# Patient Record
Sex: FEMALE | Race: WHITE | NOT HISPANIC OR LATINO | Employment: OTHER | URBAN - METROPOLITAN AREA
[De-identification: names, ages, dates, MRNs, and addresses within clinical notes are randomized per-mention and may not be internally consistent; named-entity substitution may affect disease eponyms.]

---

## 2018-01-09 ENCOUNTER — OFFICE VISIT (OUTPATIENT)
Dept: MEDICAL GROUP | Facility: MEDICAL CENTER | Age: 68
End: 2018-01-09
Payer: MEDICARE

## 2018-01-09 VITALS
RESPIRATION RATE: 16 BRPM | HEIGHT: 70 IN | WEIGHT: 143.3 LBS | HEART RATE: 80 BPM | DIASTOLIC BLOOD PRESSURE: 78 MMHG | OXYGEN SATURATION: 94 % | TEMPERATURE: 98.7 F | BODY MASS INDEX: 20.52 KG/M2 | SYSTOLIC BLOOD PRESSURE: 122 MMHG

## 2018-01-09 DIAGNOSIS — E89.0 POSTOPERATIVE HYPOTHYROIDISM: ICD-10-CM

## 2018-01-09 DIAGNOSIS — Z76.89 ESTABLISHING CARE WITH NEW DOCTOR, ENCOUNTER FOR: ICD-10-CM

## 2018-01-09 DIAGNOSIS — Z12.31 ENCOUNTER FOR SCREENING MAMMOGRAM FOR BREAST CANCER: ICD-10-CM

## 2018-01-09 DIAGNOSIS — F39 MOOD DISORDER (HCC): ICD-10-CM

## 2018-01-09 PROCEDURE — 99203 OFFICE O/P NEW LOW 30 MIN: CPT | Performed by: INTERNAL MEDICINE

## 2018-01-09 RX ORDER — ESTRADIOL 0.5 MG/1
0.5 TABLET ORAL DAILY
COMMUNITY
End: 2018-04-06

## 2018-01-09 RX ORDER — ESCITALOPRAM OXALATE 20 MG/1
20 TABLET ORAL DAILY
COMMUNITY
End: 2019-03-12 | Stop reason: SDUPTHER

## 2018-01-09 RX ORDER — CELECOXIB 200 MG/1
200 CAPSULE ORAL
COMMUNITY
End: 2019-03-12

## 2018-01-09 RX ORDER — GABAPENTIN 300 MG/1
300 CAPSULE ORAL
COMMUNITY
End: 2019-05-14

## 2018-01-09 RX ORDER — CELECOXIB 200 MG/1
200 CAPSULE ORAL 2 TIMES DAILY
COMMUNITY
End: 2018-01-09

## 2018-01-09 RX ORDER — SUMATRIPTAN 50 MG/1
50 TABLET, FILM COATED ORAL
COMMUNITY
End: 2021-08-06 | Stop reason: SDUPTHER

## 2018-01-09 RX ORDER — LEVOTHYROXINE SODIUM 0.07 MG/1
75 TABLET ORAL
COMMUNITY
End: 2018-12-01 | Stop reason: SDUPTHER

## 2018-01-09 ASSESSMENT — PATIENT HEALTH QUESTIONNAIRE - PHQ9: CLINICAL INTERPRETATION OF PHQ2 SCORE: 0

## 2018-01-09 NOTE — PROGRESS NOTES
Subjective:   Malou Pretty is a 67 y.o. female here today to establish care and        Chief complaint: Mood disorder      1. Establishing care with new doctor, encounter for    Patient moved to the [facility] from [facility]. She is here to establish with a physician here. Her daughter is an internist with the [employer], she stays busy by helping to babysit her grandchild and grand puppy. She is generally very healthy and has no outstanding chronic medical problems.  She had an annual wellness exam in [place] in October with lab work and flu shot etc.     2. Mood disorder (CMS-HCC)    On Lexapro 20 mg daily for several years. Mood stable now. No outstanding depression or anxiety.    3. Postoperative hypothyroidism    On low-dose Synthroid since parathyroidectomy in 2002.    4. Encounter for screening mammogram for breast cancer    Due for mammogram.      Current medicines (including changes today)  Current Outpatient Prescriptions   Medication Sig Dispense Refill   • gabapentin (NEURONTIN) 300 MG Cap Take 300 mg by mouth.     • escitalopram (LEXAPRO) 20 MG tablet Take 20 mg by mouth every day.     • sumatriptan (IMITREX) 50 MG Tab Take 50 mg by mouth Once PRN.     • estradiol (ESTRACE) 0.5 MG tablet Take 0.5 mg by mouth every day.     • levothyroxine (SYNTHROID) 75 MCG Tab Take 75 mcg by mouth Every morning on an empty stomach.     • Calcium Carb-Cholecalciferol (CALCIUM 1000 + D PO) Take 1 Cap by mouth 1 time daily as needed.     • celecoxib (CELEBREX) 200 MG Cap Take 200 mg by mouth 1 time daily as needed.       No current facility-administered medications for this visit.      She  has no past medical history on file.    Social History     Social History   • Marital status:      Spouse name: N/A   • Number of children: N/A   • Years of education: N/A     Social History Main Topics   • Smoking status: Never Smoker   • Smokeless tobacco: Never Used   • Alcohol use 0.6 oz/week     1 Glasses of wine per  "week      Comment: once a week   • Drug use: Unknown   • Sexual activity: No     Other Topics Concern   • Weight Concern No   • Exercise Yes     Social History Narrative   • No narrative on file     Family History   Problem Relation Age of Onset   • Other Mother      ALS   • Alzheimer's Disease Father        ROS       - Constitutional: Negative for fever, chills, unexpected weight change, and fatigue/generalized weakness.     - HEENT: Occasional migraine headache, takes Imitrex and or gabapentin.     - Respiratory: Negative for cough, Shortness of breath    - Cardiovascular: Negative for chest pain and bilateral lower extremity edema.     - Gastrointestinal: Negative for heartburn,  abdominal pain,  diarrhea, constipation . Intermittently has loose stool several times per day, since her bowel surgery last spring.    - Genitourinary: Negative for dysuria  and urinary incontinence.    - Musculoskeletal: Negative for  back pain and joint pain.     - Skin: Negative for rash . History of shingles, no postherpetic neuralgia    - Neurological: Negative for dizziness,  tremors, focal weakness     - Psychiatric/Behavioral: Negative for depression and memory loss.             Objective:     Blood pressure 122/78, pulse 80, temperature 37.1 °C (98.7 °F), resp. rate 16, height 1.778 m (5' 10\"), weight 65 kg (143 lb 4.8 oz), SpO2 94 %, not currently breastfeeding. Body mass index is 20.56 kg/m².     Physical Exam:  Constitutional: Alert, no distress.  Skin: Warm, dry, good turgor, no rashes in visible areas.  Eye: Equal, round and reactive, conjunctiva clear, lids normal.  ENMT: Lips without lesions, good dentition, oropharynx clear. Hearing grossly intact.  Neck: No masses, no thyromegaly. No cervical or supraclavicular lymphadenopathy  Respiratory: Unlabored respiratory effort, lungs clear to auscultation, no wheezes, no rhonchi.  Cardiovascular: Regular rate and rhythm, without murmur, no edema.  Abdomen: Soft, non-tender, no " masses, no hepatosplenomegaly.  Psych: Alert and oriented x3, normal affect and mood. Insight and judgment good            Assessment and Plan:   The following treatment plan was discussed    1. Establishing care with new doctor, encounter for    Patient is up-to-date with all of her screening measures. We will try to obtain her medical records for her recent labs and colonoscopy.    2. Mood disorder (CMS-HCC)    Stable, continue Lexapro    3. Postoperative hypothyroidism    Stable, continue low-dose Synthroid, takes one half of a 75 µg tablet daily    4. Encounter for screening mammogram for breast cancer      - MA-MAMMO SCREENING BILAT W/SANDY W/CAD; Future      Followup: Patient will follow up annually, sooner if necessary.

## 2018-01-09 NOTE — LETTER
Critical access hospital  Natalya Gates M.D.  [address]  [place] [address]  Fax: [phone]   Authorization for Release/Disclosure of   Protected Health Information   Name: PHONG PRETTY : 1950 SSN: [id number]   Address: [address] [place] [address]  [place] [address] Phone:    [phone] (home)    I authorize the entity listed below to release/disclose the PHI below to:   Critical access hospital/Natalya Gates M.D. and Natalya Gates M.D.   Provider or Entity Name:  Lidia Holm M.D.   Address   [facility]   Phone:  [phone]    Fax:     Reason for request: continuity of care   Information to be released:    [  ] LAST COLONOSCOPY,  including any PATH REPORT and follow-up  [  ] LAST FIT/COLOGUARD RESULT [  ] LAST DEXA  [  ] LAST MAMMOGRAM  [  ] LAST PAP  [  ] LAST LABS [  ] RETINA EXAM REPORT  [  ] IMMUNIZATION RECORDS  [ X ] Release all info      [  ] Check here and initial the line next to each item to release ALL health information INCLUDING  _____ Care and treatment for drug and / or alcohol abuse  _____ HIV testing, infection status, or AIDS  _____ Genetic Testing    DATES OF SERVICE OR TIME PERIOD TO BE DISCLOSED: _____________  I understand and acknowledge that:  * This Authorization may be revoked at any time by you in writing, except if your health information has already been used or disclosed.  * Your health information that will be used or disclosed as a result of you signing this authorization could be re-disclosed by the recipient. If this occurs, your re-disclosed health information may no longer be protected by State or Federal laws.  * You may refuse to sign this Authorization. Your refusal will not affect your ability to obtain treatment.  * This Authorization becomes effective upon signing and will  on (date) __________.      If no date is indicated, this Authorization will  one (1) year from the signature date.    Name: Phong Pretty    Signature:   Date:          1/9/2018       PLEASE FAX REQUESTED RECORDS BACK TO: [phone]

## 2018-01-17 ENCOUNTER — HOSPITAL ENCOUNTER (OUTPATIENT)
Dept: RADIOLOGY | Facility: MEDICAL CENTER | Age: 68
End: 2018-01-17
Attending: INTERNAL MEDICINE
Payer: MEDICARE

## 2018-01-17 DIAGNOSIS — Z12.31 ENCOUNTER FOR SCREENING MAMMOGRAM FOR BREAST CANCER: ICD-10-CM

## 2018-01-17 PROCEDURE — 77067 SCR MAMMO BI INCL CAD: CPT

## 2018-01-22 ENCOUNTER — HOSPITAL ENCOUNTER (OUTPATIENT)
Dept: RADIOLOGY | Facility: MEDICAL CENTER | Age: 68
End: 2018-01-22

## 2018-01-24 ENCOUNTER — TELEPHONE (OUTPATIENT)
Dept: RADIOLOGY | Facility: MEDICAL CENTER | Age: 68
End: 2018-01-24

## 2018-01-25 ENCOUNTER — HOSPITAL ENCOUNTER (OUTPATIENT)
Dept: RADIOLOGY | Facility: MEDICAL CENTER | Age: 68
End: 2018-01-25
Attending: INTERNAL MEDICINE
Payer: MEDICARE

## 2018-01-25 DIAGNOSIS — R92.8 ABNORMAL MAMMOGRAM OF LEFT BREAST: ICD-10-CM

## 2018-01-25 PROCEDURE — 77065 DX MAMMO INCL CAD UNI: CPT | Mod: LT

## 2018-01-25 PROCEDURE — 76642 ULTRASOUND BREAST LIMITED: CPT | Mod: LT

## 2018-01-25 NOTE — TELEPHONE ENCOUNTER
LM to conf apt @ [facility] on 1/25 @ 10:00 check in @ 9:45, reviewed prep and location

## 2018-04-06 ENCOUNTER — PATIENT MESSAGE (OUTPATIENT)
Dept: MEDICAL GROUP | Facility: MEDICAL CENTER | Age: 68
End: 2018-04-06

## 2018-04-06 DIAGNOSIS — Z78.0 POST-MENOPAUSAL: ICD-10-CM

## 2018-04-06 RX ORDER — ESTRADIOL 1 MG/1
1 TABLET ORAL DAILY
Qty: 30 TAB | Refills: 0 | Status: SHIPPED | OUTPATIENT
Start: 2018-04-06 | End: 2018-04-24

## 2018-04-24 ENCOUNTER — OFFICE VISIT (OUTPATIENT)
Dept: MEDICAL GROUP | Facility: MEDICAL CENTER | Age: 68
End: 2018-04-24
Payer: MEDICARE

## 2018-04-24 VITALS
DIASTOLIC BLOOD PRESSURE: 90 MMHG | RESPIRATION RATE: 16 BRPM | HEIGHT: 70 IN | BODY MASS INDEX: 20.77 KG/M2 | HEART RATE: 76 BPM | SYSTOLIC BLOOD PRESSURE: 138 MMHG | WEIGHT: 145.06 LBS | OXYGEN SATURATION: 94 % | TEMPERATURE: 97.8 F

## 2018-04-24 DIAGNOSIS — Z78.0 POST-MENOPAUSAL: ICD-10-CM

## 2018-04-24 DIAGNOSIS — R10.30 LOWER ABDOMINAL PAIN: ICD-10-CM

## 2018-04-24 DIAGNOSIS — M15.9 PRIMARY OSTEOARTHRITIS INVOLVING MULTIPLE JOINTS: ICD-10-CM

## 2018-04-24 PROBLEM — M15.0 PRIMARY OSTEOARTHRITIS INVOLVING MULTIPLE JOINTS: Status: ACTIVE | Noted: 2018-04-24

## 2018-04-24 PROCEDURE — 99214 OFFICE O/P EST MOD 30 MIN: CPT | Performed by: INTERNAL MEDICINE

## 2018-04-24 RX ORDER — ESTRADIOL 0.5 MG/1
TABLET ORAL
Qty: 45 TAB | Refills: 2 | Status: SHIPPED | OUTPATIENT
Start: 2018-04-24 | End: 2018-12-05 | Stop reason: SDUPTHER

## 2018-04-24 RX ORDER — ESTRADIOL 0.5 MG/1
0.5 TABLET ORAL DAILY
COMMUNITY
End: 2018-04-24 | Stop reason: SDUPTHER

## 2018-04-24 NOTE — PROGRESS NOTES
Chief Complaint   Patient presents with   • Possible Hernia     pt reports having discomfort in abdominal area   • Medication Management     estrace       HISTORY OF PRESENT ILLNESS: Patient is a 67 y.o. female patient who presents today to discuss the evaluation and management of:          1. Lower abdominal pain    Patient is complaining of vague lower abdominal pain. She had abdominal surgery done one year ago for bowel obstruction caused by adhesions from previous abdominal hysterectomy. She has a large midline scar and has noted some bulging in her abdominal wall to the left of this. She has occasional pain in that area when she reaches or moves. She is concerned that she could be developing a hernia. She would like to see her surgeon Dr. Blackmon and requires a referral.    2. Post-menopausal    Patient's been taking one half of a 0.5 mg Estrace tablet daily. She is considering trying to wean off of it further. She does have problems with urinary incontinence and is using a vaginal stimulator 20 minutes a day which has been helpful for her bladder control. She tried Ditropan, however did not like the dry mouth side effect. She also has vaginal estrogen that she uses weekly.    3. Primary osteoarthritis involving multiple joints    Patient has arthritis in her neck, and hands. She takes Celebrex 200 mg daily. She also has gabapentin which she takes occasionally for nerve pain in her neck and shoulders. She would like to see a rheumatologist as she has been having more pain recently.        Patient Active Problem List    Diagnosis Date Noted   • Lower abdominal pain 04/24/2018   • Primary osteoarthritis involving multiple joints 04/24/2018   • Mood disorder (CMS-[facility]) 01/09/2018   • Postoperative hypothyroidism 01/09/2018        Allergies:Neomycin and Sulfate    Current meds including changes today  Current Outpatient Prescriptions   Medication Sig Dispense Refill   • FEXOFENADINE HCL PO Take  by mouth.     •  "GUAIFENESIN PO Take  by mouth.     • estradiol (ESTRACE) 0.5 MG tablet Take 1/2 tab daily 45 Tab 2   • gabapentin (NEURONTIN) 300 MG Cap Take 300 mg by mouth.     • escitalopram (LEXAPRO) 20 MG tablet Take 20 mg by mouth every day.     • levothyroxine (SYNTHROID) 75 MCG Tab Take 75 mcg by mouth Every morning on an empty stomach.     • Calcium Carb-Cholecalciferol (CALCIUM 1000 + D PO) Take 1 Cap by mouth 1 time daily as needed.     • celecoxib (CELEBREX) 200 MG Cap Take 200 mg by mouth 1 time daily as needed.     • sumatriptan (IMITREX) 50 MG Tab Take 50 mg by mouth Once PRN.       No current facility-administered medications for this visit.      Social History   Substance Use Topics   • Smoking status: Never Smoker   • Smokeless tobacco: Never Used   • Alcohol use 0.6 oz/week     1 Glasses of wine per week      Comment: once a week     Social History     Social History Narrative   • No narrative on file       Family History   Problem Relation Age of Onset   • Alzheimer's Disease Mother    • Other Father      ALS       Review of Systems:  No chest pain, No shortness of breath, No dyspnea on exertion  Gastrointestinal ROS: No abdominal pain, No nausea, vomiting, diarrhea, or constipation        Exam:      Blood pressure 138/90, pulse 76, temperature 36.6 °C (97.8 °F), resp. rate 16, height 1.778 m (5' 10\"), weight 65.8 kg (145 lb 1 oz), SpO2 94 %, not currently breastfeeding.  General:  Well nourished, well developed female in NAD affect and mood within normal limits  Head is grossly normal.  Neck: Supple without adenopathy  Pulmonary: Clear to ausculation.  Normal effort. No rales, rhonchi, or wheezing.  Cardiovascular: Regular rate and rhythm without murmur.   Abdomen: Well-healed midline scar. Asymmetric bulge in lower abdomen to left of scar. It is mildly tender and reducible.  Extremities: no clubbing, cyanosis, or edema. OA changes both hands  Neuro: moves all extremities symmetrically    Please note that this " dictation was created using voice recognition software. I have made every reasonable attempt to correct obvious errors, but I expect that there are errors of grammar and possibly content that I did not discover before finalizing the note.    Assessment/Plan:  1. Lower abdominal pain      - REFERRAL TO GENERAL SURGERY    2. Post-menopausal    -Continue vaginal estrogen weekly, this may be helping with her incontinence as well  - estradiol (ESTRACE) 0.5 MG tablet; Take 1/2 tab daily  Dispense: 45 Tab; Refill: 2    3. Primary osteoarthritis involving multiple joints    -Continue Celebrex, discussed that she could increase to 2  200 mg tablets daily. Patient prefers to wait this time.  - REFERRAL TO RHEUMATOLOGY    Followup: No Follow-up on file.

## 2018-09-07 ENCOUNTER — PATIENT MESSAGE (OUTPATIENT)
Dept: MEDICAL GROUP | Facility: MEDICAL CENTER | Age: 68
End: 2018-09-07

## 2018-10-16 ENCOUNTER — OFFICE VISIT (OUTPATIENT)
Dept: MEDICAL GROUP | Facility: MEDICAL CENTER | Age: 68
End: 2018-10-16
Payer: MEDICARE

## 2018-10-16 VITALS
WEIGHT: 144 LBS | TEMPERATURE: 97.7 F | SYSTOLIC BLOOD PRESSURE: 110 MMHG | HEART RATE: 80 BPM | DIASTOLIC BLOOD PRESSURE: 72 MMHG | OXYGEN SATURATION: 97 % | BODY MASS INDEX: 20.62 KG/M2 | RESPIRATION RATE: 16 BRPM | HEIGHT: 70 IN

## 2018-10-16 DIAGNOSIS — M89.9 OSTEOPATHY: ICD-10-CM

## 2018-10-16 DIAGNOSIS — Z23 NEED FOR VACCINATION: ICD-10-CM

## 2018-10-16 DIAGNOSIS — M15.9 PRIMARY OSTEOARTHRITIS INVOLVING MULTIPLE JOINTS: ICD-10-CM

## 2018-10-16 DIAGNOSIS — Z79.890 POSTMENOPAUSAL HRT (HORMONE REPLACEMENT THERAPY): ICD-10-CM

## 2018-10-16 DIAGNOSIS — F39 MOOD DISORDER (HCC): ICD-10-CM

## 2018-10-16 DIAGNOSIS — E89.0 POSTOPERATIVE HYPOTHYROIDISM: ICD-10-CM

## 2018-10-16 DIAGNOSIS — Z86.39 HISTORY OF PRIMARY HYPERPARATHYROIDISM: ICD-10-CM

## 2018-10-16 PROBLEM — R10.30 LOWER ABDOMINAL PAIN: Status: RESOLVED | Noted: 2018-04-24 | Resolved: 2018-10-16

## 2018-10-16 PROCEDURE — G0008 ADMIN INFLUENZA VIRUS VAC: HCPCS | Performed by: INTERNAL MEDICINE

## 2018-10-16 PROCEDURE — 90662 IIV NO PRSV INCREASED AG IM: CPT | Performed by: INTERNAL MEDICINE

## 2018-10-16 PROCEDURE — 99214 OFFICE O/P EST MOD 30 MIN: CPT | Mod: 25 | Performed by: INTERNAL MEDICINE

## 2018-10-16 RX ORDER — CYCLOBENZAPRINE HCL 10 MG
10 TABLET ORAL 3 TIMES DAILY PRN
Qty: 45 TAB | Refills: 1 | Status: SHIPPED | OUTPATIENT
Start: 2018-10-16 | End: 2020-08-24 | Stop reason: SDUPTHER

## 2018-10-16 RX ORDER — CYCLOBENZAPRINE HCL 10 MG
10 TABLET ORAL 3 TIMES DAILY PRN
COMMUNITY
End: 2018-10-16 | Stop reason: SDUPTHER

## 2018-10-16 NOTE — PROGRESS NOTES
Chief Complaint   Patient presents with   • Labs Only     wants to get labs done,lipid,calcium,thyroid     Chief complaint: 6-month follow-up of hypothyroid, HRT.    HISTORY OF PRESENT ILLNESS: Patient is a 68 y.o. female patient who presents today to discuss the evaluation and management of:          1. Need for vaccination    Requesting flu shot    2. Osteopathy    Last bone density 6 or 7 years ago in [place].  Patient had osteopenia at that time however it was felt to be due to primary hyperparathyroidism.  Patient is currently taking adequate vitamin D and calcium supplementation.    3. History of primary hyperparathyroidism    Patient had single adenoma removed around 2003.  According to patient, serum calcium tends to be low.    4. Postoperative hypothyroidism    On Synthroid 75 mcg daily.  Due for TSH    5. Postmenopausal HRT (hormone replacement therapy)    Taking half of a 0.5 mg Estrace tablet daily.  If patient discontinues it, she gets unpleasant hot flashes.    6. Mood disorder (HCC)    Doing well with Lexapro 20 mg daily.    7. Primary osteoarthritis involving multiple joints    Patient takes Celebrex 200 mg daily, she very occasionally takes half a Flexeril if she has done a lot of gardening.        Patient Active Problem List    Diagnosis Date Noted   • Osteopathy 10/16/2018   • History of primary hyperparathyroidism 10/16/2018   • Postmenopausal HRT (hormone replacement therapy) 10/16/2018   • Primary osteoarthritis involving multiple joints 04/24/2018   • Mood disorder (HCC) 01/09/2018   • Postoperative hypothyroidism 01/09/2018        Allergies:Neomycin and Sulfate    Current meds including changes today  Current Outpatient Prescriptions   Medication Sig Dispense Refill   • cyclobenzaprine (FLEXERIL) 10 MG Tab Take 1 Tab by mouth 3 times a day as needed. 45 Tab 1   • conjugated estrogen (PREMARIN) 0.625 MG/GM Cream Insert 0.5 g in vagina every 7 days. 1 Tube 3   • estradiol (ESTRACE) 0.5 MG  "tablet Take 1/2 tab daily 45 Tab 2   • FEXOFENADINE HCL PO Take  by mouth.     • GUAIFENESIN PO Take  by mouth.     • gabapentin (NEURONTIN) 300 MG Cap Take 300 mg by mouth.     • escitalopram (LEXAPRO) 20 MG tablet Take 20 mg by mouth every day.     • sumatriptan (IMITREX) 50 MG Tab Take 50 mg by mouth Once PRN.     • levothyroxine (SYNTHROID) 75 MCG Tab Take 75 mcg by mouth Every morning on an empty stomach.     • Calcium Carb-Cholecalciferol (CALCIUM 1000 + D PO) Take 1 Cap by mouth 1 time daily as needed.     • celecoxib (CELEBREX) 200 MG Cap Take 200 mg by mouth 1 time daily as needed.       No current facility-administered medications for this visit.      Social History   Substance Use Topics   • Smoking status: Never Smoker   • Smokeless tobacco: Never Used   • Alcohol use 0.6 oz/week     1 Glasses of wine per week      Comment: once a week     Social History     Social History Narrative   • No narrative on file       Family History   Problem Relation Age of Onset   • Alzheimer's Disease Mother    • Other Father         ALS       Review of Systems:  No chest pain, No shortness of breath, No dyspnea on exertion  Gastrointestinal ROS: No abdominal pain, No nausea, vomiting, diarrhea, or constipation        Exam:      Blood pressure 110/72, pulse 80, temperature 36.5 °C (97.7 °F), temperature source Temporal, resp. rate 16, height 1.778 m (5' 10\"), weight 65.3 kg (144 lb), SpO2 97 %, not currently breastfeeding.  General:  Well nourished, well developed female in NAD affect and mood within normal limits  Head is grossly normal.  Neck: Supple without adenopathy  Pulmonary: Clear to ausculation.  Normal effort. No rales, rhonchi, or wheezing.  Cardiovascular: Regular rate and rhythm without murmur.   Extremities: no clubbing, cyanosis, or edema.  Neuro: moves all extremities symmetrically    Please note that this dictation was created using voice recognition software. I have made every reasonable attempt to " correct obvious errors, but I expect that there are errors of grammar and possibly content that I did not discover before finalizing the note.    Assessment/Plan:  1. Need for vaccination      - INFLUENZA VACCINE, HIGH DOSE (65+ ONLY)    2. Osteopathy    -Patient is on adequate calcium and vitamin D  - DS-BONE DENSITY STUDY (DEXA); Future    3. History of primary hyperparathyroidism      - COMP METABOLIC PANEL; Future    4. Postoperative hypothyroidism      - TSH WITH REFLEX TO FT4; Future    5. Postmenopausal HRT (hormone replacement therapy)      - conjugated estrogen (PREMARIN) 0.625 MG/GM Cream; Insert 0.5 g in vagina every 7 days.  Dispense: 1 Tube; Refill: 3    6. Mood disorder (HCC)    Stable, continue Lexapro    7. Primary osteoarthritis involving multiple joints      - cyclobenzaprine (FLEXERIL) 10 MG Tab; Take 1 Tab by mouth 3 times a day as needed.  Dispense: 45 Tab; Refill: 1    Followup: Patient will schedule her annual wellness visit in the next several weeks.

## 2018-10-18 ENCOUNTER — HOSPITAL ENCOUNTER (OUTPATIENT)
Dept: LAB | Facility: MEDICAL CENTER | Age: 68
End: 2018-10-18
Attending: INTERNAL MEDICINE
Payer: MEDICARE

## 2018-10-18 DIAGNOSIS — Z86.39 HISTORY OF PRIMARY HYPERPARATHYROIDISM: ICD-10-CM

## 2018-10-18 DIAGNOSIS — E89.0 POSTOPERATIVE HYPOTHYROIDISM: ICD-10-CM

## 2018-10-18 LAB
ALBUMIN SERPL BCP-MCNC: 3.8 G/DL (ref 3.2–4.9)
ALBUMIN/GLOB SERPL: 1.3 G/DL
ALP SERPL-CCNC: 19 U/L (ref 30–99)
ALT SERPL-CCNC: 18 U/L (ref 2–50)
ANION GAP SERPL CALC-SCNC: 7 MMOL/L (ref 0–11.9)
AST SERPL-CCNC: 22 U/L (ref 12–45)
BILIRUB SERPL-MCNC: 0.4 MG/DL (ref 0.1–1.5)
BUN SERPL-MCNC: 15 MG/DL (ref 8–22)
CALCIUM SERPL-MCNC: 8.8 MG/DL (ref 8.5–10.5)
CHLORIDE SERPL-SCNC: 97 MMOL/L (ref 96–112)
CO2 SERPL-SCNC: 29 MMOL/L (ref 20–33)
CREAT SERPL-MCNC: 0.92 MG/DL (ref 0.5–1.4)
FASTING STATUS PATIENT QL REPORTED: NORMAL
GLOBULIN SER CALC-MCNC: 3 G/DL (ref 1.9–3.5)
GLUCOSE SERPL-MCNC: 107 MG/DL (ref 65–99)
POTASSIUM SERPL-SCNC: 4.2 MMOL/L (ref 3.6–5.5)
PROT SERPL-MCNC: 6.8 G/DL (ref 6–8.2)
SODIUM SERPL-SCNC: 133 MMOL/L (ref 135–145)

## 2018-10-18 PROCEDURE — 80053 COMPREHEN METABOLIC PANEL: CPT

## 2018-10-18 PROCEDURE — 36415 COLL VENOUS BLD VENIPUNCTURE: CPT

## 2018-10-18 PROCEDURE — 84443 ASSAY THYROID STIM HORMONE: CPT

## 2018-10-19 LAB — TSH SERPL DL<=0.005 MIU/L-ACNC: 2.01 UIU/ML (ref 0.38–5.33)

## 2018-10-25 ENCOUNTER — HOSPITAL ENCOUNTER (OUTPATIENT)
Dept: RADIOLOGY | Facility: MEDICAL CENTER | Age: 68
End: 2018-10-25
Attending: INTERNAL MEDICINE
Payer: MEDICARE

## 2018-10-25 DIAGNOSIS — M89.9 OSTEOPATHY: ICD-10-CM

## 2018-10-25 PROCEDURE — 77080 DXA BONE DENSITY AXIAL: CPT

## 2018-11-05 ENCOUNTER — TELEPHONE (OUTPATIENT)
Dept: MEDICAL GROUP | Facility: MEDICAL CENTER | Age: 68
End: 2018-11-05

## 2018-11-05 NOTE — TELEPHONE ENCOUNTER
1. Caller Name: Malou Pretty                                                  Call Back Number: [phone] (home)         Patient approves a detailed voicemail message: N\A    Started PAR for this Pt. for Cyclobenzaprine

## 2018-11-06 ENCOUNTER — TELEPHONE (OUTPATIENT)
Dept: MEDICAL GROUP | Facility: MEDICAL CENTER | Age: 68
End: 2018-11-06

## 2018-11-06 ENCOUNTER — OFFICE VISIT (OUTPATIENT)
Dept: MEDICAL GROUP | Facility: MEDICAL CENTER | Age: 68
End: 2018-11-06
Payer: MEDICARE

## 2018-11-06 VITALS
BODY MASS INDEX: 20.96 KG/M2 | OXYGEN SATURATION: 96 % | TEMPERATURE: 98 F | HEART RATE: 62 BPM | RESPIRATION RATE: 16 BRPM | DIASTOLIC BLOOD PRESSURE: 70 MMHG | HEIGHT: 70 IN | WEIGHT: 146.39 LBS | SYSTOLIC BLOOD PRESSURE: 112 MMHG

## 2018-11-06 DIAGNOSIS — Z79.890 POSTMENOPAUSAL HRT (HORMONE REPLACEMENT THERAPY): ICD-10-CM

## 2018-11-06 DIAGNOSIS — F39 MOOD DISORDER (HCC): ICD-10-CM

## 2018-11-06 DIAGNOSIS — Z86.39 HISTORY OF PRIMARY HYPERPARATHYROIDISM: ICD-10-CM

## 2018-11-06 DIAGNOSIS — E89.0 POSTOPERATIVE HYPOTHYROIDISM: ICD-10-CM

## 2018-11-06 DIAGNOSIS — M15.9 PRIMARY OSTEOARTHRITIS INVOLVING MULTIPLE JOINTS: ICD-10-CM

## 2018-11-06 PROBLEM — M89.9 OSTEOPATHY: Status: RESOLVED | Noted: 2018-10-16 | Resolved: 2018-11-06

## 2018-11-06 PROCEDURE — G0439 PPPS, SUBSEQ VISIT: HCPCS | Performed by: INTERNAL MEDICINE

## 2018-11-06 ASSESSMENT — PATIENT HEALTH QUESTIONNAIRE - PHQ9: CLINICAL INTERPRETATION OF PHQ2 SCORE: 0

## 2018-11-06 ASSESSMENT — ENCOUNTER SYMPTOMS: GENERAL WELL-BEING: EXCELLENT

## 2018-11-06 ASSESSMENT — ACTIVITIES OF DAILY LIVING (ADL): BATHING_REQUIRES_ASSISTANCE: 0

## 2018-11-06 NOTE — PROGRESS NOTES
Chief Complaint   Patient presents with   • Annual Wellness Visit         HPI:  Malou is a 68 y.o. here for Medicare Annual Wellness Visit    Malou is here today for subsequent annual wellness visit.  She has no complaints, she has been doing well.      Patient Active Problem List    Diagnosis Date Noted   • History of primary hyperparathyroidism 10/16/2018   • Postmenopausal HRT (hormone replacement therapy) 10/16/2018   • Primary osteoarthritis involving multiple joints 04/24/2018   • Mood disorder (HCC) 01/09/2018   • Postoperative hypothyroidism 01/09/2018       Current Outpatient Prescriptions   Medication Sig Dispense Refill   • cyclobenzaprine (FLEXERIL) 10 MG Tab Take 1 Tab by mouth 3 times a day as needed. 45 Tab 1   • escitalopram (LEXAPRO) 20 MG tablet Take 20 mg by mouth every day.     • levothyroxine (SYNTHROID) 75 MCG Tab Take 75 mcg by mouth Every morning on an empty stomach.     • Calcium Carb-Cholecalciferol (CALCIUM 1000 + D PO) Take 1 Cap by mouth 1 time daily as needed.     • celecoxib (CELEBREX) 200 MG Cap Take 200 mg by mouth 1 time daily as needed.     • conjugated estrogen (PREMARIN) 0.625 MG/GM Cream Insert 0.5 g in vagina every 7 days. 1 Tube 3   • FEXOFENADINE HCL PO Take  by mouth.     • GUAIFENESIN PO Take  by mouth.     • estradiol (ESTRACE) 0.5 MG tablet Take 1/2 tab daily 45 Tab 2   • gabapentin (NEURONTIN) 300 MG Cap Take 300 mg by mouth.     • sumatriptan (IMITREX) 50 MG Tab Take 50 mg by mouth Once PRN.       No current facility-administered medications for this visit.         Patient is taking medications as noted in medication list.  Current supplements as per medication list.     Allergies: Neomycin and Sulfate    Current social contact/activities: Pt. States she is in a paint group with friends, they also have game nights and go shopping.       Is patient current with immunizations? No, due for TDAP and SHINGRIX (Shingles). Patient is interested in receiving NONE today.    She   reports that she has never smoked. She has never used smokeless tobacco. She reports that she drinks about 0.6 oz of alcohol per week . She reports that she does not use drugs.  Counseling given: Not Answered      DPA/Advanced directive: Patient has Advanced Directive, but it is not on file. Instructed to bring in a copy to scan into their chart.    ROS:    Gait: Uses no assistive device     Ostomy: No     Other tubes: No     Amputations: No     Chronic oxygen use No     Last eye exam Pt. States it was about three weeks ago.     Wears hearing aids: No     : Denies any urinary leakage during the last 6 months        Screening:          Depression Screening    Little interest or pleasure in doing things?  0 - not at all  Feeling down, depressed, or hopeless? 0 - not at all  Patient Health Questionnaire Score: 0    If depressive symptoms identified deferred to follow up visit unless specifically addressed in assessment and plan.    Interpretation of PHQ-9 Total Score   Score Severity   1-4 No Depression   5-9 Mild Depression   10-14 Moderate Depression   15-19 Moderately Severe Depression   20-27 Severe Depression    Screening for Cognitive Impairment    Three Minute Recall (leader, season, table)  3/3    Kole clock face with all 12 numbers and set the hands to show 10 past 11.  Yes 5/5  If cognitive concerns identified, deferred for follow up unless specifically addressed in assessment and plan.    Fall Risk Assessment    Has the patient had two or more falls in the last year or any fall with injury in the last year?  No  If fall risk identified, deferred for follow up unless specifically addressed in assessment and plan.    Safety Assessment    Throw rugs on floor.  Yes  Handrails on all stairs.  Yes  Good lighting in all hallways.  Yes  Difficulty hearing.  No  Patient counseled about all safety risks that were identified.    Functional Assessment ADLs    Are there any barriers preventing you from cooking for  yourself or meeting nutritional needs?  No.    Are there any barriers preventing you from driving safely or obtaining transportation?  No.    Are there any barriers preventing you from using a telephone or calling for help?  No.    Are there any barriers preventing you from shopping?  No.    Are there any barriers preventing you from taking care of your own finances?  No.    Are there any barriers preventing you from managing your medications?  No.    Are there any barriers preventing you from showering, bathing or dressing yourself?  No.    Are you currently engaging in any exercise or physical activity?  Yes.  Pt. States she does piliates, chair yoga, stationary bike and walking. She also does house hold work.  What is your perception of your health?  Excellent.    Health Maintenance Summary                Annual Wellness Visit Overdue 1950     IMM DTaP/Tdap/Td Vaccine Overdue 6/14/1967     IMM ZOSTER VACCINES Overdue 6/14/1998     MAMMOGRAM Next Due 1/25/2019      Done 1/25/2018 MA-DIAGNOSTIC MAMMO-UNILAT     Patient has more history with this topic...    COLONOSCOPY Next Due 9/23/2023      Done 9/23/2013     BONE DENSITY Next Due 10/25/2023      Done 10/25/2018 DS-BONE DENSITY STUDY (DEXA)          Patient Care Team:  Natalya Gates M.D. as PCP - General (Internal Medicine)    Social History   Substance Use Topics   • Smoking status: Never Smoker   • Smokeless tobacco: Never Used   • Alcohol use 0.6 oz/week     1 Glasses of wine per week      Comment: once a week     Family History   Problem Relation Age of Onset   • Alzheimer's Disease Mother    • Other Father         ALS     She  has no past medical history on file.   Past Surgical History:   Procedure Laterality Date   • HYSTERECTOMY, TOTAL ABDOMINAL     • KNEE REPLACEMENT, TOTAL     • LYSIS ADHESIONS GENERAL     • PARATHYROIDECTOMY             Exam:     Blood pressure 112/70, pulse 62, temperature 36.7 °C (98 °F), temperature source Temporal, resp.  "rate 16, height 1.765 m (5' 9.5\"), weight 66.4 kg (146 lb 6.2 oz), SpO2 96 %, not currently breastfeeding. Body mass index is 21.31 kg/m².    Hearing excellent.    Dentition good  Alert, oriented in no acute distress.  Eye contact is good, speech goal directed, affect calm      Assessment and Plan. The following treatment and monitoring plan is recommended:    1. History of primary hyperparathyroidism   calcium level normal, continue to monitor   2. Mood disorder (HCC)   problem stable, continue Lexapro   3. Postmenopausal HRT (hormone replacement therapy)   on low-dose Estrace and vaginal estrogen   4. Postoperative hypothyroidism   recent TSH normal, continue Synthroid   5. Primary osteoarthritis involving multiple joints   patient remains active, continue Celebrex         Services suggested: No services needed at this time  Health Care Screening recommendations as per orders if indicated.  Referrals offered: PT/OT/Nutrition counseling/Behavioral Health/Smoking cessation as per orders if indicated.    Discussion today about general wellness and lifestyle habits:    · Prevent falls and reduce trip hazards; Cautioned about securing or removing rugs.  · Have a working fire alarm and carbon monoxide detector;   · Engage in regular physical activity and social activities       Follow-up: Annually, sooner as needed  "

## 2018-11-06 NOTE — TELEPHONE ENCOUNTER
1. Caller Name: Malou Pretty                                                Call Back Number: [phone] (home)         Patient approves a detailed voicemail message: N\A    Began a PA for Pt. medication Premarin on 11/06/2018

## 2018-12-05 DIAGNOSIS — Z78.0 POST-MENOPAUSAL: ICD-10-CM

## 2018-12-05 NOTE — TELEPHONE ENCOUNTER
Was the patient seen in the last year in this department? Yes    Does patient have an active prescription for medications requested? No     Received Request Via: Patient

## 2018-12-06 ENCOUNTER — TELEPHONE (OUTPATIENT)
Dept: MEDICAL GROUP | Facility: MEDICAL CENTER | Age: 68
End: 2018-12-06

## 2018-12-06 RX ORDER — ESTRADIOL 0.5 MG/1
TABLET ORAL
Qty: 45 TAB | Refills: 2 | Status: SHIPPED | OUTPATIENT
Start: 2018-12-06 | End: 2020-01-15 | Stop reason: SDUPTHER

## 2018-12-06 NOTE — TELEPHONE ENCOUNTER
MEDICATION PRIOR AUTHORIZATION NEEDED:    1. Name of Medication: Estradiol 0.5 mg    2. Requested By (Name of Pharmacy): [facility] on [address]     3. Is insurance on file current? Medicare     4. What is the name & phone number of the 3rd party payor? [phone]    PAR started on 12/06/2018.

## 2018-12-12 NOTE — TELEPHONE ENCOUNTER
FINAL PRIOR AUTHORIZATION STATUS:    1.  Name of Medication & Dose: Estradiol 0.5 mg     2. Prior Auth Status: Approved through It was approved for 45 tablets per 90 days     3. Action Taken: Pharmacy Notified: yes Patient Notified: N\A

## 2018-12-21 ENCOUNTER — APPOINTMENT (RX ONLY)
Dept: URBAN - METROPOLITAN AREA CLINIC 4 | Facility: CLINIC | Age: 68
Setting detail: DERMATOLOGY
End: 2018-12-21

## 2018-12-21 DIAGNOSIS — L82.1 OTHER SEBORRHEIC KERATOSIS: ICD-10-CM

## 2018-12-21 DIAGNOSIS — L81.4 OTHER MELANIN HYPERPIGMENTATION: ICD-10-CM

## 2018-12-21 DIAGNOSIS — D18.0 HEMANGIOMA: ICD-10-CM

## 2018-12-21 DIAGNOSIS — D22 MELANOCYTIC NEVI: ICD-10-CM

## 2018-12-21 PROBLEM — D22.62 MELANOCYTIC NEVI OF LEFT UPPER LIMB, INCLUDING SHOULDER: Status: ACTIVE | Noted: 2018-12-21

## 2018-12-21 PROBLEM — D22.71 MELANOCYTIC NEVI OF RIGHT LOWER LIMB, INCLUDING HIP: Status: ACTIVE | Noted: 2018-12-21

## 2018-12-21 PROBLEM — D22.5 MELANOCYTIC NEVI OF TRUNK: Status: ACTIVE | Noted: 2018-12-21

## 2018-12-21 PROBLEM — D22.72 MELANOCYTIC NEVI OF LEFT LOWER LIMB, INCLUDING HIP: Status: ACTIVE | Noted: 2018-12-21

## 2018-12-21 PROBLEM — D18.01 HEMANGIOMA OF SKIN AND SUBCUTANEOUS TISSUE: Status: ACTIVE | Noted: 2018-12-21

## 2018-12-21 PROBLEM — D22.61 MELANOCYTIC NEVI OF RIGHT UPPER LIMB, INCLUDING SHOULDER: Status: ACTIVE | Noted: 2018-12-21

## 2018-12-21 PROCEDURE — 99203 OFFICE O/P NEW LOW 30 MIN: CPT

## 2018-12-21 PROCEDURE — ? COUNSELING

## 2018-12-21 PROCEDURE — ? SUNSCREEN RECOMMENDATIONS

## 2018-12-21 ASSESSMENT — LOCATION DETAILED DESCRIPTION DERM
LOCATION DETAILED: LEFT CENTRAL MALAR CHEEK
LOCATION DETAILED: RIGHT RIB CAGE
LOCATION DETAILED: RIGHT ANTERIOR PROXIMAL THIGH
LOCATION DETAILED: LEFT PROXIMAL DORSAL FOREARM
LOCATION DETAILED: RIGHT PROXIMAL DORSAL FOREARM
LOCATION DETAILED: RIGHT SUPERIOR MEDIAL MIDBACK
LOCATION DETAILED: LEFT ANTERIOR PROXIMAL THIGH
LOCATION DETAILED: RIGHT RADIAL DORSAL HAND
LOCATION DETAILED: SUPERIOR THORACIC SPINE
LOCATION DETAILED: LEFT INFERIOR ANTERIOR NECK
LOCATION DETAILED: LEFT ULNAR DORSAL HAND
LOCATION DETAILED: RIGHT DISTAL POSTERIOR UPPER ARM
LOCATION DETAILED: PERIUMBILICAL SKIN
LOCATION DETAILED: LEFT SUPERIOR MEDIAL UPPER BACK
LOCATION DETAILED: LEFT PROXIMAL POSTERIOR UPPER ARM
LOCATION DETAILED: RIGHT CENTRAL MALAR CHEEK

## 2018-12-21 ASSESSMENT — LOCATION ZONE DERM
LOCATION ZONE: FACE
LOCATION ZONE: ARM
LOCATION ZONE: HAND
LOCATION ZONE: TRUNK
LOCATION ZONE: NECK
LOCATION ZONE: LEG

## 2018-12-21 ASSESSMENT — LOCATION SIMPLE DESCRIPTION DERM
LOCATION SIMPLE: RIGHT HAND
LOCATION SIMPLE: RIGHT THIGH
LOCATION SIMPLE: ABDOMEN
LOCATION SIMPLE: RIGHT LOWER BACK
LOCATION SIMPLE: RIGHT POSTERIOR UPPER ARM
LOCATION SIMPLE: LEFT ANTERIOR NECK
LOCATION SIMPLE: RIGHT FOREARM
LOCATION SIMPLE: LEFT THIGH
LOCATION SIMPLE: LEFT UPPER BACK
LOCATION SIMPLE: RIGHT CHEEK
LOCATION SIMPLE: LEFT CHEEK
LOCATION SIMPLE: LEFT FOREARM
LOCATION SIMPLE: LEFT HAND
LOCATION SIMPLE: UPPER BACK
LOCATION SIMPLE: LEFT POSTERIOR UPPER ARM

## 2019-01-25 ENCOUNTER — PATIENT MESSAGE (OUTPATIENT)
Dept: MEDICAL GROUP | Facility: MEDICAL CENTER | Age: 69
End: 2019-01-25

## 2019-01-25 DIAGNOSIS — N95.1 POST MENOPAUSAL SYNDROME: ICD-10-CM

## 2019-02-07 ENCOUNTER — OFFICE VISIT (OUTPATIENT)
Dept: URGENT CARE | Facility: CLINIC | Age: 69
End: 2019-02-07
Payer: MEDICARE

## 2019-02-07 VITALS
HEART RATE: 76 BPM | BODY MASS INDEX: 22.07 KG/M2 | RESPIRATION RATE: 16 BRPM | DIASTOLIC BLOOD PRESSURE: 80 MMHG | OXYGEN SATURATION: 95 % | SYSTOLIC BLOOD PRESSURE: 132 MMHG | TEMPERATURE: 97.7 F | HEIGHT: 69 IN | WEIGHT: 149 LBS

## 2019-02-07 DIAGNOSIS — J01.90 ACUTE BACTERIAL SINUSITIS: ICD-10-CM

## 2019-02-07 DIAGNOSIS — B96.89 ACUTE BACTERIAL SINUSITIS: ICD-10-CM

## 2019-02-07 PROCEDURE — 99213 OFFICE O/P EST LOW 20 MIN: CPT | Performed by: NURSE PRACTITIONER

## 2019-02-07 RX ORDER — CELECOXIB 200 MG/1
CAPSULE ORAL
COMMUNITY
Start: 2018-10-15 | End: 2019-03-12 | Stop reason: SDUPTHER

## 2019-02-07 RX ORDER — ESTRADIOL 0.5 MG/1
TABLET ORAL
COMMUNITY
Start: 2018-12-06 | End: 2019-05-14

## 2019-02-07 RX ORDER — ESTRADIOL 0.5 MG/1
TABLET ORAL
COMMUNITY
Start: 2018-03-16 | End: 2019-05-14

## 2019-02-07 RX ORDER — LEVOTHYROXINE SODIUM 0.07 MG/1
75 TABLET ORAL
COMMUNITY
End: 2019-05-14

## 2019-02-07 RX ORDER — AMOXICILLIN AND CLAVULANATE POTASSIUM 875; 125 MG/1; MG/1
1 TABLET, FILM COATED ORAL 2 TIMES DAILY
Qty: 14 TAB | Refills: 0 | Status: SHIPPED | OUTPATIENT
Start: 2019-02-07 | End: 2019-05-14

## 2019-02-07 ASSESSMENT — ENCOUNTER SYMPTOMS
COUGH: 1
FEVER: 0
HEADACHES: 0
SORE THROAT: 1
ORTHOPNEA: 0
SPUTUM PRODUCTION: 0
WHEEZING: 0
NAUSEA: 0
SHORTNESS OF BREATH: 0
MYALGIAS: 0
CHILLS: 0
EYE DISCHARGE: 0
DIARRHEA: 0

## 2019-02-07 NOTE — PROGRESS NOTES
Subjective:      Malou Pretty is a 68 y.o. female who presents with Pharyngitis (sore throat, (L) ear ache x 1 week)            HPI New problem. 68 year old female with sore throat, cough and congestion for over a week. She has left ear ache as well. Denies fever, chills, myalgia, shortness of breath or wheezing. She has no chest pain or hemoptysis. Taking sudafed and anti histamine for this with minimal relief. Sore throat is worse in morning and night.  Neomycin and Sulfate  Current Outpatient Prescriptions on File Prior to Visit   Medication Sig Dispense Refill   • estradiol (ESTRACE) 0.5 MG tablet Take 1/2 tab daily 45 Tab 2   • levothyroxine (SYNTHROID) 75 MCG Tab Take 1 Tab by mouth Every morning on an empty stomach. 90 Tab 3   • cyclobenzaprine (FLEXERIL) 10 MG Tab Take 1 Tab by mouth 3 times a day as needed. 45 Tab 1   • conjugated estrogen (PREMARIN) 0.625 MG/GM Cream Insert 0.5 g in vagina every 7 days. 1 Tube 3   • FEXOFENADINE HCL PO Take  by mouth.     • GUAIFENESIN PO Take  by mouth.     • gabapentin (NEURONTIN) 300 MG Cap Take 300 mg by mouth.     • escitalopram (LEXAPRO) 20 MG tablet Take 20 mg by mouth every day.     • sumatriptan (IMITREX) 50 MG Tab Take 50 mg by mouth Once PRN.     • Calcium Carb-Cholecalciferol (CALCIUM 1000 + D PO) Take 1 Cap by mouth 1 time daily as needed.     • celecoxib (CELEBREX) 200 MG Cap Take 200 mg by mouth 1 time daily as needed.       No current facility-administered medications on file prior to visit.      Social History     Social History   • Marital status:      Spouse name: N/A   • Number of children: N/A   • Years of education: N/A     Occupational History   • Not on file.     Social History Main Topics   • Smoking status: Never Smoker   • Smokeless tobacco: Never Used   • Alcohol use 0.6 oz/week     1 Glasses of wine per week      Comment: once a week   • Drug use: No   • Sexual activity: No     Other Topics Concern   • Weight Concern No   • Exercise Yes  "    Social History Narrative   • No narrative on file     family history includes Alzheimer's Disease in her mother; Other in her father.      Review of Systems   Constitutional: Positive for malaise/fatigue. Negative for chills and fever.   HENT: Positive for congestion and sore throat.    Eyes: Negative for discharge.   Respiratory: Positive for cough. Negative for sputum production, shortness of breath and wheezing.    Cardiovascular: Negative for chest pain and orthopnea.   Gastrointestinal: Negative for diarrhea and nausea.   Musculoskeletal: Negative for myalgias.   Neurological: Negative for headaches.   Endo/Heme/Allergies: Negative for environmental allergies.          Objective:     /80 (BP Location: Left arm, Patient Position: Sitting, BP Cuff Size: Adult)   Pulse 76   Temp 36.5 °C (97.7 °F) (Temporal)   Resp 16   Ht 1.765 m (5' 9.49\")   Wt 67.6 kg (149 lb)   SpO2 95%   BMI 21.70 kg/m²      Physical Exam   Constitutional: She is oriented to person, place, and time. She appears well-developed and well-nourished. No distress.   HENT:   Head: Normocephalic and atraumatic.   Right Ear: External ear and ear canal normal. Tympanic membrane is not injected and not perforated. No middle ear effusion.   Left Ear: External ear and ear canal normal. Tympanic membrane is not injected and not perforated.  No middle ear effusion.   Nose: Mucosal edema present.   Mouth/Throat: Posterior oropharyngeal erythema present. No oropharyngeal exudate.   Eyes: Conjunctivae are normal. Right eye exhibits no discharge. Left eye exhibits no discharge.   Neck: Normal range of motion. Neck supple.   Cardiovascular: Normal rate, regular rhythm and normal heart sounds.    No murmur heard.  Pulmonary/Chest: Effort normal and breath sounds normal. No respiratory distress.   Musculoskeletal: Normal range of motion.   Normal movement of all 4 extremities.   Lymphadenopathy:     She has no cervical adenopathy.        Right: No " supraclavicular adenopathy present.        Left: No supraclavicular adenopathy present.   Neurological: She is alert and oriented to person, place, and time. Gait normal.   Skin: Skin is warm and dry.   Psychiatric: She has a normal mood and affect. Her behavior is normal. Thought content normal.   Nursing note and vitals reviewed.              Assessment/Plan:     1. Acute bacterial sinusitis  amoxicillin-clavulanate (AUGMENTIN) 875-125 MG Tab      Differential diagnosis, natural history, supportive care, and indications for immediate follow-up discussed at length.

## 2019-03-01 ENCOUNTER — HOSPITAL ENCOUNTER (OUTPATIENT)
Dept: RADIOLOGY | Facility: MEDICAL CENTER | Age: 69
End: 2019-03-01
Attending: INTERNAL MEDICINE
Payer: MEDICARE

## 2019-03-01 DIAGNOSIS — N64.4 BREAST PAIN, LEFT: ICD-10-CM

## 2019-03-01 PROCEDURE — 76642 ULTRASOUND BREAST LIMITED: CPT | Mod: LT

## 2019-03-01 PROCEDURE — G0279 TOMOSYNTHESIS, MAMMO: HCPCS

## 2019-03-12 ENCOUNTER — PATIENT MESSAGE (OUTPATIENT)
Dept: MEDICAL GROUP | Facility: MEDICAL CENTER | Age: 69
End: 2019-03-12

## 2019-03-12 RX ORDER — CELECOXIB 200 MG/1
200 CAPSULE ORAL DAILY
Qty: 90 CAP | Refills: 1 | Status: SHIPPED | OUTPATIENT
Start: 2019-03-12 | End: 2019-09-04 | Stop reason: SDUPTHER

## 2019-03-12 RX ORDER — ESCITALOPRAM OXALATE 20 MG/1
20 TABLET ORAL DAILY
Qty: 90 TAB | Refills: 1 | Status: SHIPPED | OUTPATIENT
Start: 2019-03-12 | End: 2019-09-04 | Stop reason: SDUPTHER

## 2019-05-14 ENCOUNTER — OFFICE VISIT (OUTPATIENT)
Dept: MEDICAL GROUP | Facility: MEDICAL CENTER | Age: 69
End: 2019-05-14
Payer: MEDICARE

## 2019-05-14 VITALS
WEIGHT: 146 LBS | SYSTOLIC BLOOD PRESSURE: 138 MMHG | DIASTOLIC BLOOD PRESSURE: 86 MMHG | TEMPERATURE: 97.9 F | OXYGEN SATURATION: 96 % | HEART RATE: 80 BPM | BODY MASS INDEX: 21.62 KG/M2 | RESPIRATION RATE: 16 BRPM | HEIGHT: 69 IN

## 2019-05-14 DIAGNOSIS — E89.0 POSTOPERATIVE HYPOTHYROIDISM: ICD-10-CM

## 2019-05-14 DIAGNOSIS — M79.10 MYALGIA: ICD-10-CM

## 2019-05-14 DIAGNOSIS — Z00.00 PREVENTATIVE HEALTH CARE: ICD-10-CM

## 2019-05-14 DIAGNOSIS — I87.2 VENOUS INSUFFICIENCY OF BOTH LOWER EXTREMITIES: ICD-10-CM

## 2019-05-14 PROCEDURE — 99214 OFFICE O/P EST MOD 30 MIN: CPT | Performed by: INTERNAL MEDICINE

## 2019-05-14 ASSESSMENT — PATIENT HEALTH QUESTIONNAIRE - PHQ9: CLINICAL INTERPRETATION OF PHQ2 SCORE: 0

## 2019-05-15 ENCOUNTER — HOSPITAL ENCOUNTER (OUTPATIENT)
Dept: LAB | Facility: MEDICAL CENTER | Age: 69
End: 2019-05-15
Attending: INTERNAL MEDICINE
Payer: MEDICARE

## 2019-05-15 DIAGNOSIS — M79.10 MYALGIA: ICD-10-CM

## 2019-05-15 DIAGNOSIS — E89.0 POSTOPERATIVE HYPOTHYROIDISM: ICD-10-CM

## 2019-05-15 DIAGNOSIS — Z00.00 PREVENTATIVE HEALTH CARE: ICD-10-CM

## 2019-05-15 LAB
ALBUMIN SERPL BCP-MCNC: 3.9 G/DL (ref 3.2–4.9)
ALBUMIN/GLOB SERPL: 1.3 G/DL
ALP SERPL-CCNC: 17 U/L (ref 30–99)
ALT SERPL-CCNC: 20 U/L (ref 2–50)
ANION GAP SERPL CALC-SCNC: 7 MMOL/L (ref 0–11.9)
AST SERPL-CCNC: 24 U/L (ref 12–45)
BASOPHILS # BLD AUTO: 1.2 % (ref 0–1.8)
BASOPHILS # BLD: 0.07 K/UL (ref 0–0.12)
BILIRUB SERPL-MCNC: 0.5 MG/DL (ref 0.1–1.5)
BUN SERPL-MCNC: 17 MG/DL (ref 8–22)
CALCIUM SERPL-MCNC: 8.8 MG/DL (ref 8.5–10.5)
CHLORIDE SERPL-SCNC: 98 MMOL/L (ref 96–112)
CHOLEST SERPL-MCNC: 253 MG/DL (ref 100–199)
CO2 SERPL-SCNC: 29 MMOL/L (ref 20–33)
CREAT SERPL-MCNC: 0.88 MG/DL (ref 0.5–1.4)
EOSINOPHIL # BLD AUTO: 0.22 K/UL (ref 0–0.51)
EOSINOPHIL NFR BLD: 3.6 % (ref 0–6.9)
ERYTHROCYTE [DISTWIDTH] IN BLOOD BY AUTOMATED COUNT: 46.6 FL (ref 35.9–50)
ERYTHROCYTE [SEDIMENTATION RATE] IN BLOOD BY WESTERGREN METHOD: 6 MM/HOUR (ref 0–30)
FASTING STATUS PATIENT QL REPORTED: NORMAL
GLOBULIN SER CALC-MCNC: 2.9 G/DL (ref 1.9–3.5)
GLUCOSE SERPL-MCNC: 78 MG/DL (ref 65–99)
HCT VFR BLD AUTO: 42.4 % (ref 37–47)
HDLC SERPL-MCNC: 92 MG/DL
HGB BLD-MCNC: 14.5 G/DL (ref 12–16)
IMM GRANULOCYTES # BLD AUTO: 0.01 K/UL (ref 0–0.11)
IMM GRANULOCYTES NFR BLD AUTO: 0.2 % (ref 0–0.9)
LDLC SERPL CALC-MCNC: 151 MG/DL
LYMPHOCYTES # BLD AUTO: 2.82 K/UL (ref 1–4.8)
LYMPHOCYTES NFR BLD: 46.8 % (ref 22–41)
MCH RBC QN AUTO: 31.3 PG (ref 27–33)
MCHC RBC AUTO-ENTMCNC: 34.2 G/DL (ref 33.6–35)
MCV RBC AUTO: 91.6 FL (ref 81.4–97.8)
MONOCYTES # BLD AUTO: 0.77 K/UL (ref 0–0.85)
MONOCYTES NFR BLD AUTO: 12.8 % (ref 0–13.4)
NEUTROPHILS # BLD AUTO: 2.14 K/UL (ref 2–7.15)
NEUTROPHILS NFR BLD: 35.4 % (ref 44–72)
NRBC # BLD AUTO: 0 K/UL
NRBC BLD-RTO: 0 /100 WBC
PLATELET # BLD AUTO: 244 K/UL (ref 164–446)
PMV BLD AUTO: 9.6 FL (ref 9–12.9)
POTASSIUM SERPL-SCNC: 3.9 MMOL/L (ref 3.6–5.5)
PROT SERPL-MCNC: 6.8 G/DL (ref 6–8.2)
RBC # BLD AUTO: 4.63 M/UL (ref 4.2–5.4)
SODIUM SERPL-SCNC: 134 MMOL/L (ref 135–145)
TRIGL SERPL-MCNC: 48 MG/DL (ref 0–149)
TSH SERPL DL<=0.005 MIU/L-ACNC: 2.81 UIU/ML (ref 0.38–5.33)
WBC # BLD AUTO: 6 K/UL (ref 4.8–10.8)

## 2019-05-15 PROCEDURE — 85025 COMPLETE CBC W/AUTO DIFF WBC: CPT

## 2019-05-15 PROCEDURE — 80061 LIPID PANEL: CPT

## 2019-05-15 PROCEDURE — 84443 ASSAY THYROID STIM HORMONE: CPT

## 2019-05-15 PROCEDURE — 80053 COMPREHEN METABOLIC PANEL: CPT

## 2019-05-15 PROCEDURE — 85652 RBC SED RATE AUTOMATED: CPT

## 2019-05-15 PROCEDURE — 36415 COLL VENOUS BLD VENIPUNCTURE: CPT

## 2019-05-15 NOTE — PROGRESS NOTES
"Chief Complaint   Patient presents with   • Fatigue     \"I feel like I've been hit by a truck\"   • Sleep Problem     pt states still feeling sleepy after waking up     Chief complaint: Profound fatigue, myalgias.  Last seen 6 months ago    HISTORY OF PRESENT ILLNESS: Patient is a 68 y.o. female patient who presents today to discuss the evaluation and management of:          1. Myalgia    Patient states that upon returning from 18-day cruise through the [place] in late January that she has been fatigued, with severe muscle and joint aching.  She has stopped doing Pilates, and even her danita chi class due to feeling exhausted.  She tried to go out and garden recently, and had to sit down.  She denies shortness of breath, weight loss, nausea or vomiting.  She has mild anorexia, but her weight has been stable.  She has no specific pain.  She is able to perform her activities of daily living.  She states that her myalgias improve somewhat throughout the day.  She is on no new medication.  Patient is status post right knee replacement, she has had no redness or warmth in that joint.  It does remain somewhat more swollen than her left    2. Postoperative hypothyroidism    Last TSH was normal in October 2018.  Patient did change from brand name Synthroid to generic levothyroxine, and is concerned that this could be making a difference.    3. Preventative health care    Requesting fasting lipids    4. Venous insufficiency of both lower extremities    Patient has significant venous insufficiency, she had a procedure on her left leg done 12 years ago.  She has no edema, but has large tortuous dilated veins on both legs in addition to spider veins.        Patient Active Problem List    Diagnosis Date Noted   • Myalgia 05/14/2019   • Venous insufficiency of both lower extremities 05/14/2019   • History of primary hyperparathyroidism 10/16/2018   • Postmenopausal HRT (hormone replacement therapy) 10/16/2018   • Primary " "osteoarthritis involving multiple joints 04/24/2018   • Mood disorder (HCC) 01/09/2018   • Postoperative hypothyroidism 01/09/2018        Allergies:Formaldehyde; Neomycin; Nickel; Quaternium-15; and Sulfate    Current meds including changes today  Current Outpatient Prescriptions   Medication Sig Dispense Refill   • escitalopram (LEXAPRO) 20 MG tablet Take 1 Tab by mouth every day. 90 Tab 1   • celecoxib (CELEBREX) 200 MG Cap Take 1 Cap by mouth every day. 90 Cap 1   • fexofenadine (ALLEGRA) 30 MG tablet Take 30 mg by mouth.     • estradiol (ESTRACE) 0.5 MG tablet Take 1/2 tab daily 45 Tab 2   • levothyroxine (SYNTHROID) 75 MCG Tab Take 1 Tab by mouth Every morning on an empty stomach. 90 Tab 3   • cyclobenzaprine (FLEXERIL) 10 MG Tab Take 1 Tab by mouth 3 times a day as needed. 45 Tab 1   • conjugated estrogen (PREMARIN) 0.625 MG/GM Cream Insert 0.5 g in vagina every 7 days. 1 Tube 3   • sumatriptan (IMITREX) 50 MG Tab Take 50 mg by mouth Once PRN.     • Calcium Carb-Cholecalciferol (CALCIUM 1000 + D PO) Take 1 Cap by mouth 1 time daily as needed.       No current facility-administered medications for this visit.      Social History   Substance Use Topics   • Smoking status: Never Smoker   • Smokeless tobacco: Never Used   • Alcohol use 0.6 oz/week     1 Glasses of wine per week      Comment: once a week     Social History     Social History Narrative   • No narrative on file       Family History   Problem Relation Age of Onset   • Alzheimer's Disease Mother    • Other Father         ALS       Review of Systems:  No chest pain, No shortness of breath, No dyspnea on exertion  Gastrointestinal ROS: No abdominal pain, No nausea, vomiting, diarrhea, or constipation        Exam:      /86 (BP Location: Left arm, Patient Position: Sitting)   Pulse 80   Temp 36.6 °C (97.9 °F) (Temporal)   Resp 16   Ht 1.765 m (5' 9.49\")   Wt 66.2 kg (146 lb)   SpO2 96%   General:  Well nourished, well developed female in NAD " affect and mood within normal limits  Head is grossly normal.  Neck: Supple without adenopathy  Pulmonary: Clear to ausculation.  Normal effort. No rales, rhonchi, or wheezing.  Cardiovascular: Regular rate and rhythm without murmur.   Extremities: no clubbing, cyanosis, or edema.  Marked changes of venous insufficiency with dilated veins and spider veins on both legs  Neuro: moves all extremities symmetrically    Please note that this dictation was created using voice recognition software. I have made every reasonable attempt to correct obvious errors, but I expect that there are errors of grammar and possibly content that I did not discover before finalizing the note.    Assessment/Plan:  1. Myalgia    -Rule out chronic inflammatory process such as polymyalgia rheumatica, metabolic disturbance  - CBC WITH DIFFERENTIAL; Future  - WESTERGREN SED RATE; Future  - Comp Metabolic Panel; Future    2. Postoperative hypothyroidism      - TSH WITH REFLEX TO FT4; Future    3. Preventative health care      - Lipid Profile; Future    4. Venous insufficiency of both lower extremities    -Patient is leg symptoms may respond to treatment for venous insufficiency, she would be interested in investigating this.  - REFERRAL TO VASCULAR SURGERY    Followup: Patient will receive the results of her lab work through my chart, appropriate follow-up will be done pending these results.

## 2019-09-04 RX ORDER — CELECOXIB 200 MG/1
200 CAPSULE ORAL DAILY
Qty: 90 CAP | Refills: 0 | Status: SHIPPED | OUTPATIENT
Start: 2019-09-04 | End: 2019-12-02 | Stop reason: SDUPTHER

## 2019-09-04 RX ORDER — ESCITALOPRAM OXALATE 20 MG/1
20 TABLET ORAL DAILY
Qty: 90 TAB | Refills: 0 | Status: SHIPPED | OUTPATIENT
Start: 2019-09-04 | End: 2019-12-02 | Stop reason: SDUPTHER

## 2019-09-25 ENCOUNTER — OFFICE VISIT (OUTPATIENT)
Dept: MEDICAL GROUP | Facility: MEDICAL CENTER | Age: 69
End: 2019-09-25
Payer: MEDICARE

## 2019-09-25 VITALS
DIASTOLIC BLOOD PRESSURE: 70 MMHG | HEART RATE: 71 BPM | OXYGEN SATURATION: 95 % | TEMPERATURE: 99.1 F | BODY MASS INDEX: 21.36 KG/M2 | HEIGHT: 69 IN | WEIGHT: 144.2 LBS | SYSTOLIC BLOOD PRESSURE: 108 MMHG

## 2019-09-25 DIAGNOSIS — B02.9 HERPES ZOSTER WITHOUT COMPLICATION: ICD-10-CM

## 2019-09-25 DIAGNOSIS — M15.9 PRIMARY OSTEOARTHRITIS INVOLVING MULTIPLE JOINTS: ICD-10-CM

## 2019-09-25 DIAGNOSIS — E89.0 POSTOPERATIVE HYPOTHYROIDISM: ICD-10-CM

## 2019-09-25 DIAGNOSIS — G43.909 MIGRAINE WITHOUT STATUS MIGRAINOSUS, NOT INTRACTABLE, UNSPECIFIED MIGRAINE TYPE: ICD-10-CM

## 2019-09-25 DIAGNOSIS — F41.9 ANXIETY AND DEPRESSION: ICD-10-CM

## 2019-09-25 DIAGNOSIS — Z23 NEED FOR INFLUENZA VACCINATION: ICD-10-CM

## 2019-09-25 DIAGNOSIS — E78.2 MIXED HYPERLIPIDEMIA: ICD-10-CM

## 2019-09-25 DIAGNOSIS — F32.A ANXIETY AND DEPRESSION: ICD-10-CM

## 2019-09-25 DIAGNOSIS — I87.2 VENOUS INSUFFICIENCY OF BOTH LOWER EXTREMITIES: ICD-10-CM

## 2019-09-25 DIAGNOSIS — E03.9 HYPOTHYROIDISM, UNSPECIFIED TYPE: ICD-10-CM

## 2019-09-25 DIAGNOSIS — Z86.39 HISTORY OF PRIMARY HYPERPARATHYROIDISM: ICD-10-CM

## 2019-09-25 PROCEDURE — G0008 ADMIN INFLUENZA VIRUS VAC: HCPCS | Performed by: INTERNAL MEDICINE

## 2019-09-25 PROCEDURE — 99204 OFFICE O/P NEW MOD 45 MIN: CPT | Mod: 25 | Performed by: INTERNAL MEDICINE

## 2019-09-25 PROCEDURE — 90662 IIV NO PRSV INCREASED AG IM: CPT | Performed by: INTERNAL MEDICINE

## 2019-09-25 RX ORDER — GABAPENTIN 300 MG/1
300 CAPSULE ORAL
COMMUNITY
End: 2019-09-25 | Stop reason: SDUPTHER

## 2019-09-25 RX ORDER — GABAPENTIN 100 MG/1
300 CAPSULE ORAL 3 TIMES DAILY
Qty: 180 CAP | Refills: 1 | Status: SHIPPED | OUTPATIENT
Start: 2019-09-25 | End: 2020-03-30

## 2019-09-25 RX ORDER — ALBUTEROL SULFATE 90 UG/1
2 AEROSOL, METERED RESPIRATORY (INHALATION)
COMMUNITY
Start: 2014-07-14 | End: 2021-09-16 | Stop reason: SDUPTHER

## 2019-09-25 RX ORDER — AMOXICILLIN 500 MG
CAPSULE ORAL
COMMUNITY

## 2019-09-25 RX ORDER — ATORVASTATIN CALCIUM 20 MG/1
20 TABLET, FILM COATED ORAL EVERY EVENING
Qty: 90 TAB | Refills: 1 | Status: SHIPPED | OUTPATIENT
Start: 2019-09-25 | End: 2020-03-30

## 2019-09-25 SDOH — HEALTH STABILITY: MENTAL HEALTH: HOW OFTEN DO YOU HAVE 6 OR MORE DRINKS ON ONE OCCASION?: NEVER

## 2019-09-25 SDOH — HEALTH STABILITY: MENTAL HEALTH: HOW MANY STANDARD DRINKS CONTAINING ALCOHOL DO YOU HAVE ON A TYPICAL DAY?: 1 OR 2

## 2019-09-25 SDOH — HEALTH STABILITY: MENTAL HEALTH: HOW OFTEN DO YOU HAVE A DRINK CONTAINING ALCOHOL?: 2-4 TIMES A MONTH

## 2019-09-25 NOTE — ASSESSMENT & PLAN NOTE
This is a chronic and stable problem.  Most recent lab work from May 2019 showed appropriate values.  Continue levothyroxine 75 mcg daily.

## 2019-09-25 NOTE — ASSESSMENT & PLAN NOTE
This is a new problem that is complicated by neuropathic pain.  The lesions have evolved through the crusting phase and are now dried and flat against the skin.  However, there is underlying neuropathic pain in the right axilla which is bothersome during the day when she is completing her ADLs.  I think it is reasonable to increase her gabapentin and send a new prescription as the prior pill she was taking her couple years old.  We will start gabapentin 100 mg tablets she can take 3 tablets 3 times a day but she is going to titrate this very carefully to make sure she has not experienced side effects of lower extremity swelling or grogginess/imbalance.  We will try to find a balance of medicine where she can use the lowest dose needed to help with the pain.  She is agreeable with this plan.

## 2019-09-25 NOTE — ASSESSMENT & PLAN NOTE
This is a chronic and stable problem.  Due to intermittent swelling of the right lower extremity she is considering a vein procedure in the near future but it is not scheduled yet at this time.  She will keep me updated.  Request records from [facility].

## 2019-09-25 NOTE — ASSESSMENT & PLAN NOTE
This is a chronic and stable problem.  She has sumatriptan available as an abortive medicine but she rarely uses it, the last time was approximately a year ago.  We often see that this condition improves postmenopausal.  Okay to continue sumatriptan 50 mg as needed.

## 2019-09-25 NOTE — ASSESSMENT & PLAN NOTE
This is a chronic and stable problem.  Continue her Lexapro 20 mg daily as prescribed.  No signs of SIADH on most recent blood work from 8/20/2019.  We will recheck CMP in approximately 8 weeks.

## 2019-09-25 NOTE — ASSESSMENT & PLAN NOTE
This is a chronic and stable problem.  We had a discussion of the risk and benefits of long-term anti-inflammatory therapy for somebody in their 70s.  I pointed out that if she would become dehydrated they should be at risk for an acute kidney injury and also that her dyspepsia could worsen and eventually lead to a gastric or duodenal ulcer.  We discussed the warning symptoms such as gnawing pain in the abdomen and change in stool to melena.  She voiced understanding and would like to continue the Celebrex every other day which is reasonable.

## 2019-09-25 NOTE — ASSESSMENT & PLAN NOTE
This is a chronic and stable problem.  Her calcium normalized postoperatively and she has done well.  She should continue her current vitamin D and calcium regimen.

## 2019-09-25 NOTE — ASSESSMENT & PLAN NOTE
This is a new and uncontrolled problem.  After review of her laboratory work and shared decision making with the use of the [facility] statin decision aid we have agreed to start her on atorvastatin 20 mg daily.  She will let me know if she develops any other intolerances we discussed including nausea, constipation, diarrhea, myalgias.  We will follow-up with repeat lipid panel to ensure compliance and liver enzymes to check for side effects in approximately 8 weeks.

## 2019-09-26 NOTE — PROGRESS NOTES
Subjective:     CC:  Diagnoses of Mixed hyperlipidemia, Need for influenza vaccination, Primary osteoarthritis involving multiple joints, Herpes zoster without complication, Postoperative hypothyroidism, Anxiety and depression, History of primary hyperparathyroidism, Venous insufficiency of both lower extremities, Hypothyroidism, unspecified type, and Migraine without status migrainosus, not intractable, unspecified migraine type were pertinent to this visit.    HISTORY OF THE PRESENT ILLNESS: Patient is a 69 y.o. female. This pleasant patient is here today to establish care and discuss the below stated chronic medical conditions. She is unaccompanied for the visit.    Problem   Mixed Hyperlipidemia    This is a new problem and she has never been on statin medication in the past.  We pulled up the [facility] decision aid for starting statin medication and after going through the detail she would be interested.  We had a long discussion over the risks and benefits of statin therapy as well as adverse side effects to be aware of.  I printed out the result of the statin decision aid and gave her a copy to take home to review.  She is agreeable to starting atorvastatin 20 mg daily.    Lab Results   Component Value Date/Time    CHOLSTRLTOT 253 (H) 05/15/2019 06:35 AM     (H) 05/15/2019 06:35 AM    HDL 92 05/15/2019 06:35 AM    TRIGLYCERIDE 48 05/15/2019 06:35 AM       Lab Results   Component Value Date/Time    SODIUM 134 (L) 05/15/2019 06:35 AM    POTASSIUM 3.9 05/15/2019 06:35 AM    CHLORIDE 98 05/15/2019 06:35 AM    CO2 29 05/15/2019 06:35 AM    GLUCOSE 78 05/15/2019 06:35 AM    BUN 17 05/15/2019 06:35 AM    CREATININE 0.88 05/15/2019 06:35 AM     Lab Results   Component Value Date/Time    ALKPHOSPHAT 17 (L) 05/15/2019 06:35 AM    ASTSGOT 24 05/15/2019 06:35 AM    ALTSGPT 20 05/15/2019 06:35 AM    TBILIRUBIN 0.5 05/15/2019 06:35 AM         Venous Insufficiency of Both Lower Extremities    She reports challenges  "with intermittent swelling of the lower legs, right greater than left, and has seen [facility] in the past.  She shares that her left saphenous was \"closed\" in 2012.  She thinks she may have had sclerotherapy on the left lower extremity and they are planning to do some procedure on the right lower extremity.  We will need to request records for me to fully understand how the vascular surgeons are treating her.  At this time things are stable.     History of Primary Hyperparathyroidism    This was treated with parathyroidectomy with one lobe removed while the 3 remained.  She reports that her values normalized soon after.  She denies any postoperative complications.  She continues with calcium and vitamin D supplementation.  No history of osteoporosis.  She is on thyroid medication.  She has no concerns at this time.     Anxiety and Depression    She has a long-standing history of anxiety-predominant mood disorder.  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  She is using Lexapro 20 mg daily which she feels keeps her mood even keel.  She denies any significant mood fluctuations.  No side effects to current regiment.     Primary Osteoarthritis Involving Multiple Joints    She reports challenges with joint pains in the right shoulder, bilateral hands, and right knee.  She is status post right shoulder arthroscopy and right total knee replacement.  She was previously utilizing meloxicam but this led to dyspepsia and she was transitioned onto Celebrex which she is utilizing every other day.  It does cause stomach irritation occasionally but she has no history of GI bleed and denies current melena, hematochezia, or abdominal pain.  At this point she has had the shoulder and the knee operated on but with her hands will still notice stiffness and swelling if she does not take the Celebrex.     Migraines    This is " to be more of a problem for her back when she was working in high stress job.  She reports only utilizing her sumatriptan once in the past year.  She will sometimes use Tylenol which is helpful.  She has no concerns regarding her history of migraines.        Hypothyroidism    She has maintained on levothyroxine with stable thyroid numbers.  She is currently utilizing 75 mcg daily.  Most recent labs demonstrate TSH of 2.81.  No signs or symptoms of over or under treatment at this time.  She does have some chronic constipation and is having bowel movements about every day or every other day.  Otherwise no tremor, changes in hair or nails, or palpitations.     Shingles    She reports a history of shingles years ago (approximately 23) of the right forehead.  She denies prior vaccine use due to concern with the Zostavax formulation including a compound that she is allergic to.  She is unsure if the Shingrix also would put her at risk for an allergy.  This current flareup started approximately September 6 with some lesions on the right posterior back that she felt could be shingles but she also has history of eczema and so her  placed mometasone steroid topical which seem to help.  Then while they were on vacation approximately 2 weeks later she had nerve pain and interruption of lesions in the typical dermatomal pattern from her right mid back wrapping around her axilla and over the right chest and breast.  The most significant neuropathic pain that she is experience has been on the right axilla.  She does have some previous gabapentin 300 mg which she is utilize at night with minimal benefit.  She did complete a 7-day course of valacyclovir.  She has an associated dry cough which she is unsure if it is related.          Allergies: Formaldehyde; Neomycin; Nickel; Quaternium-15; and Sulfate    Current Outpatient Medications Ordered in Epic   Medication Sig Dispense Refill   • albuterol 108 (90 Base) MCG/ACT Aero  Soln inhalation aerosol Inhale 2 Puffs by mouth.     • atorvastatin (LIPITOR) 20 MG Tab Take 1 Tab by mouth every evening. 90 Tab 1   • gabapentin (NEURONTIN) 100 MG Cap Take 3 Caps by mouth 3 times a day. 180 Cap 1   • Omega-3 Fatty Acids (FISH OIL) 1200 MG Cap Take  by mouth.     • escitalopram (LEXAPRO) 20 MG tablet Take 1 Tab by mouth every day. 90 Tab 0   • celecoxib (CELEBREX) 200 MG Cap Take 1 Cap by mouth every day. 90 Cap 0   • fexofenadine (ALLEGRA) 30 MG tablet Take 30 mg by mouth.     • estradiol (ESTRACE) 0.5 MG tablet Take 1/2 tab daily 45 Tab 2   • levothyroxine (SYNTHROID) 75 MCG Tab Take 1 Tab by mouth Every morning on an empty stomach. 90 Tab 3   • cyclobenzaprine (FLEXERIL) 10 MG Tab Take 1 Tab by mouth 3 times a day as needed. 45 Tab 1   • conjugated estrogen (PREMARIN) 0.625 MG/GM Cream Insert 0.5 g in vagina every 7 days. 1 Tube 3   • sumatriptan (IMITREX) 50 MG Tab Take 50 mg by mouth Once PRN.     • Calcium Carb-Cholecalciferol (CALCIUM 1000 + D PO) Take 1 Cap by mouth 1 time daily as needed.       No current Epic-ordered facility-administered medications on file.        Past Medical History:   Diagnosis Date   • Anxiety    • Cataract    • Hyperlipidemia    • Migraine    • Parathyroid disorder (HCC)    • Thyroid disease        Past Surgical History:   Procedure Laterality Date   • ABDOMINAL HYSTERECTOMY TOTAL     • EYE SURGERY     • HYSTERECTOMY, VAGINAL     • KNEE REPLACEMENT, TOTAL     • LYSIS ADHESIONS GENERAL      vertical low abdominal   • PARATHYROIDECTOMY         Social History     Tobacco Use   • Smoking status: Never Smoker   • Smokeless tobacco: Never Used   Substance Use Topics   • Alcohol use: Yes     Alcohol/week: 0.6 oz     Types: 1 Glasses of wine per week     Frequency: 2-4 times a month     Drinks per session: 1 or 2     Binge frequency: Never     Comment: once a week   • Drug use: No       Social History     Social History Narrative    Malou is from [facility] and  her  "high school sweetMelo guillen, and they have been  for 52 years. They have 3 children; Samir (50, [place]), Capri (49, [place]), Lidia (46, [place]). She has 3 grandchildren, 1 in [place] and 2 in [place]. 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 She loves the outdoors and enjoys hiking and walking near [place].        Family History   Problem Relation Age of Onset   • Alzheimer's Disease Mother    • Other Father         ALS   • No Known Problems Sister    • No Known Problems Brother    • No Known Problems Maternal Aunt        Health Maintenance: Completed    ROS:   As above in the HPI All Others Reviewed and Negative  Objective:     Exam: /70 (BP Location: Left arm, Patient Position: Sitting, BP Cuff Size: Adult)   Pulse 71   Temp 37.3 °C (99.1 °F) (Temporal)   Ht 1.758 m (5' 9.21\")   Wt 65.4 kg (144 lb 3.2 oz)   SpO2 95%  Body mass index is 21.16 kg/m².    General: Normal appearing. No distress. Appears stated age.  EENT: Eyes conjunctiva clear lids without ptosis, extraocular movements intact, ears normal shape and contour, canals are clear bilaterally, tympanic membranes are benign, oropharynx is without erythema, edema or exudates. Good dentition.   Neck: Supple without JVD. Thyroid is not enlarged.  Pulmonary: Clear to ausculation.  Normal effort. No rales, ronchi, or wheezing. No cough.  Cardiovascular: Regular rate and rhythm without murmur. No lower extremity edema bilaterally.  Abdomen: Soft, nontender, nondistended. Normal bowel sounds. Prior vertical lower abdominal scar.  Neurologic: No tremor, increased rigidity, or increased " tone.   Lymph: No cervical or supraclavicular lymph nodes are palpable.   Skin: Warm and dry. Resolving herpetic lesions in right T3 dermatome distribution. Several seborrheic keratoses on chest and abdomen. Varicose veins in bilateral lower extremities.  Musculoskeletal: Normal gait. No extremity cyanosis, clubbing, or edema. Patient ambulates independently and without a gait aid.  Psych: Normal mood and affect. Alert and oriented x3. Judgment and insight is normal.    Assessment & Plan:   69 y.o. female with the following -    Problem List Items Addressed This Visit     Anxiety and depression     This is a chronic and stable problem.  Continue her Lexapro 20 mg daily as prescribed.  No signs of SIADH on most recent blood work from 8/20/2019.  We will recheck CMP in approximately 8 weeks.         Primary osteoarthritis involving multiple joints     This is a chronic and stable problem.  We had a discussion of the risk and benefits of long-term anti-inflammatory therapy for somebody in their 70s.  I pointed out that if she would become dehydrated they should be at risk for an acute kidney injury and also that her dyspepsia could worsen and eventually lead to a gastric or duodenal ulcer.  We discussed the warning symptoms such as gnawing pain in the abdomen and change in stool to melena.  She voiced understanding and would like to continue the Celebrex every other day which is reasonable.         Relevant Orders    Comp Metabolic Panel    History of primary hyperparathyroidism     This is a chronic and stable problem.  Her calcium normalized postoperatively and she has done well.  She should continue her current vitamin D and calcium regimen.         Venous insufficiency of both lower extremities     This is a chronic and stable problem.  Due to intermittent swelling of the right lower extremity she is considering a vein procedure in the near future but it is not scheduled yet at this time.  She will keep me updated.   Request records from [facility].         Relevant Medications    atorvastatin (LIPITOR) 20 MG Tab    Hypothyroidism     This is a chronic and stable problem.  Most recent lab work from May 2019 showed appropriate values.  Continue levothyroxine 75 mcg daily.         Migraines     This is a chronic and stable problem.  She has sumatriptan available as an abortive medicine but she rarely uses it, the last time was approximately a year ago.  We often see that this condition improves postmenopausal.  Okay to continue sumatriptan 50 mg as needed.         Relevant Medications    atorvastatin (LIPITOR) 20 MG Tab    gabapentin (NEURONTIN) 100 MG Cap    Shingles     This is a new problem that is complicated by neuropathic pain.  The lesions have evolved through the crusting phase and are now dried and flat against the skin.  However, there is underlying neuropathic pain in the right axilla which is bothersome during the day when she is completing her ADLs.  I think it is reasonable to increase her gabapentin and send a new prescription as the prior pill she was taking her couple years old.  We will start gabapentin 100 mg tablets she can take 3 tablets 3 times a day but she is going to titrate this very carefully to make sure she has not experienced side effects of lower extremity swelling or grogginess/imbalance.  We will try to find a balance of medicine where she can use the lowest dose needed to help with the pain.  She is agreeable with this plan.         Relevant Medications    gabapentin (NEURONTIN) 100 MG Cap    Other Relevant Orders    Comp Metabolic Panel    Mixed hyperlipidemia     This is a new and uncontrolled problem.  After review of her laboratory work and shared decision making with the use of the [facility] statin decision aid we have agreed to start her on atorvastatin 20 mg daily.  She will let me know if she develops any other intolerances we discussed including nausea, constipation, diarrhea,  myalgias.  We will follow-up with repeat lipid panel to ensure compliance and liver enzymes to check for side effects in approximately 8 weeks.         Relevant Medications    atorvastatin (LIPITOR) 20 MG Tab    Other Relevant Orders    Lipid Profile    Comp Metabolic Panel      Other Visit Diagnoses     Need for influenza vaccination        Relevant Orders    INFLUENZA VACCINE, HIGH DOSE (65+ ONLY) (Completed)    Postoperative hypothyroidism               Return in about 6 months (around 3/25/2020).    Please note that this dictation was created using voice recognition software. I have made every reasonable attempt to correct obvious errors, but I expect that there are errors of grammar and possibly content that I did not discover before finalizing the note.

## 2019-12-23 ENCOUNTER — APPOINTMENT (RX ONLY)
Dept: URBAN - METROPOLITAN AREA CLINIC 4 | Facility: CLINIC | Age: 69
Setting detail: DERMATOLOGY
End: 2019-12-23

## 2019-12-23 DIAGNOSIS — L82.1 OTHER SEBORRHEIC KERATOSIS: ICD-10-CM

## 2019-12-23 DIAGNOSIS — D18.0 HEMANGIOMA: ICD-10-CM

## 2019-12-23 DIAGNOSIS — D22 MELANOCYTIC NEVI: ICD-10-CM

## 2019-12-23 DIAGNOSIS — L81.4 OTHER MELANIN HYPERPIGMENTATION: ICD-10-CM

## 2019-12-23 PROBLEM — D22.71 MELANOCYTIC NEVI OF RIGHT LOWER LIMB, INCLUDING HIP: Status: ACTIVE | Noted: 2019-12-23

## 2019-12-23 PROBLEM — D22.62 MELANOCYTIC NEVI OF LEFT UPPER LIMB, INCLUDING SHOULDER: Status: ACTIVE | Noted: 2019-12-23

## 2019-12-23 PROBLEM — D22.61 MELANOCYTIC NEVI OF RIGHT UPPER LIMB, INCLUDING SHOULDER: Status: ACTIVE | Noted: 2019-12-23

## 2019-12-23 PROBLEM — D18.01 HEMANGIOMA OF SKIN AND SUBCUTANEOUS TISSUE: Status: ACTIVE | Noted: 2019-12-23

## 2019-12-23 PROBLEM — D22.5 MELANOCYTIC NEVI OF TRUNK: Status: ACTIVE | Noted: 2019-12-23

## 2019-12-23 PROBLEM — D22.72 MELANOCYTIC NEVI OF LEFT LOWER LIMB, INCLUDING HIP: Status: ACTIVE | Noted: 2019-12-23

## 2019-12-23 PROCEDURE — ? SUNSCREEN RECOMMENDATIONS

## 2019-12-23 PROCEDURE — 99213 OFFICE O/P EST LOW 20 MIN: CPT

## 2019-12-23 PROCEDURE — ? COUNSELING

## 2019-12-23 PROCEDURE — ? ADDITIONAL NOTES

## 2019-12-23 ASSESSMENT — LOCATION SIMPLE DESCRIPTION DERM
LOCATION SIMPLE: ABDOMEN
LOCATION SIMPLE: LEFT FOREARM
LOCATION SIMPLE: LEFT HAND
LOCATION SIMPLE: LEFT EYEBROW
LOCATION SIMPLE: RIGHT HAND
LOCATION SIMPLE: LEFT CHEEK
LOCATION SIMPLE: RIGHT POSTERIOR UPPER ARM
LOCATION SIMPLE: RIGHT FOREARM
LOCATION SIMPLE: LEFT ANTERIOR NECK
LOCATION SIMPLE: CHEST
LOCATION SIMPLE: RIGHT THIGH
LOCATION SIMPLE: UPPER BACK
LOCATION SIMPLE: RIGHT CHEEK
LOCATION SIMPLE: LEFT THIGH
LOCATION SIMPLE: LEFT UPPER BACK
LOCATION SIMPLE: RIGHT LOWER BACK
LOCATION SIMPLE: LOWER BACK
LOCATION SIMPLE: LEFT POSTERIOR UPPER ARM

## 2019-12-23 ASSESSMENT — LOCATION DETAILED DESCRIPTION DERM
LOCATION DETAILED: LEFT CENTRAL EYEBROW
LOCATION DETAILED: PERIUMBILICAL SKIN
LOCATION DETAILED: RIGHT ANTERIOR PROXIMAL THIGH
LOCATION DETAILED: RIGHT PROXIMAL DORSAL FOREARM
LOCATION DETAILED: LEFT ULNAR DORSAL HAND
LOCATION DETAILED: RIGHT DISTAL POSTERIOR UPPER ARM
LOCATION DETAILED: RIGHT RADIAL DORSAL HAND
LOCATION DETAILED: RIGHT SUPERIOR MEDIAL MIDBACK
LOCATION DETAILED: LEFT PROXIMAL DORSAL FOREARM
LOCATION DETAILED: SUPERIOR THORACIC SPINE
LOCATION DETAILED: LEFT SUPERIOR MEDIAL UPPER BACK
LOCATION DETAILED: LEFT PROXIMAL POSTERIOR UPPER ARM
LOCATION DETAILED: EPIGASTRIC SKIN
LOCATION DETAILED: MIDDLE STERNUM
LOCATION DETAILED: LEFT CENTRAL MALAR CHEEK
LOCATION DETAILED: RIGHT RIB CAGE
LOCATION DETAILED: LEFT ANTERIOR PROXIMAL THIGH
LOCATION DETAILED: LEFT INFERIOR ANTERIOR NECK
LOCATION DETAILED: SUPERIOR LUMBAR SPINE
LOCATION DETAILED: RIGHT CENTRAL MALAR CHEEK

## 2019-12-23 ASSESSMENT — LOCATION ZONE DERM
LOCATION ZONE: NECK
LOCATION ZONE: HAND
LOCATION ZONE: FACE
LOCATION ZONE: TRUNK
LOCATION ZONE: ARM
LOCATION ZONE: FACE
LOCATION ZONE: LEG

## 2019-12-23 NOTE — HPI: FULL BODY SKIN EXAMINATION
How Severe Are Your Spot(S)?: mild
What Type Of Note Output Would You Prefer (Optional)?: Standard Output
What Is The Reason For Today's Visit?: Full Body Skin Examination
What Is The Reason For Today's Visit? (Being Monitored For X): concerning skin lesions on an annual basis
Additional History: Patient presents for a full body exam today no concerns at this time. No history of skin cancer.

## 2019-12-23 NOTE — PROCEDURE: ADDITIONAL NOTES
Detail Level: Detailed
Additional Notes: Patient will call clinic to verify what type of Retin-a she is using so we can call in RX

## 2020-02-04 DIAGNOSIS — N64.4 BREAST PAIN: ICD-10-CM

## 2020-03-09 ENCOUNTER — HOSPITAL ENCOUNTER (OUTPATIENT)
Dept: RADIOLOGY | Facility: MEDICAL CENTER | Age: 70
End: 2020-03-09
Attending: INTERNAL MEDICINE
Payer: MEDICARE

## 2020-03-09 DIAGNOSIS — N64.4 BREAST PAIN: ICD-10-CM

## 2020-03-09 PROCEDURE — 76642 ULTRASOUND BREAST LIMITED: CPT | Mod: LT

## 2020-03-09 PROCEDURE — G0279 TOMOSYNTHESIS, MAMMO: HCPCS

## 2020-03-09 NOTE — RESULT ENCOUNTER NOTE
Chito Westfall,    Mammogram results are stable but they mentioned you were having breast pain at the time of the exam. Our next follow up is not until March 30th so if you have ongoing pain and want to move up your appointment please let me know.    Thanks,  Dr. [place]

## 2020-03-30 ENCOUNTER — OFFICE VISIT (OUTPATIENT)
Dept: MEDICAL GROUP | Facility: MEDICAL CENTER | Age: 70
End: 2020-03-30
Payer: MEDICARE

## 2020-03-30 VITALS
DIASTOLIC BLOOD PRESSURE: 79 MMHG | HEART RATE: 64 BPM | OXYGEN SATURATION: 97 % | TEMPERATURE: 98.3 F | SYSTOLIC BLOOD PRESSURE: 110 MMHG | BODY MASS INDEX: 21.39 KG/M2 | WEIGHT: 144.4 LBS | HEIGHT: 69 IN

## 2020-03-30 DIAGNOSIS — I87.2 VENOUS INSUFFICIENCY OF BOTH LOWER EXTREMITIES: ICD-10-CM

## 2020-03-30 DIAGNOSIS — Z11.59 ENCOUNTER FOR HEPATITIS C SCREENING TEST FOR LOW RISK PATIENT: ICD-10-CM

## 2020-03-30 DIAGNOSIS — Z00.00 MEDICARE ANNUAL WELLNESS VISIT, SUBSEQUENT: Primary | ICD-10-CM

## 2020-03-30 DIAGNOSIS — E03.9 HYPOTHYROIDISM, UNSPECIFIED TYPE: ICD-10-CM

## 2020-03-30 DIAGNOSIS — E78.2 MIXED HYPERLIPIDEMIA: ICD-10-CM

## 2020-03-30 PROCEDURE — G0439 PPPS, SUBSEQ VISIT: HCPCS | Performed by: INTERNAL MEDICINE

## 2020-03-30 RX ORDER — CELECOXIB 200 MG/1
200 CAPSULE ORAL
COMMUNITY
End: 2020-06-30

## 2020-03-30 RX ORDER — LEVOTHYROXINE SODIUM 0.07 MG/1
75 TABLET ORAL
COMMUNITY
End: 2020-08-24

## 2020-03-30 RX ORDER — ESCITALOPRAM OXALATE 10 MG/1
10 TABLET ORAL
COMMUNITY
End: 2020-08-24

## 2020-03-30 ASSESSMENT — PATIENT HEALTH QUESTIONNAIRE - PHQ9: CLINICAL INTERPRETATION OF PHQ2 SCORE: 0

## 2020-03-30 ASSESSMENT — ENCOUNTER SYMPTOMS: GENERAL WELL-BEING: GOOD

## 2020-03-30 ASSESSMENT — ACTIVITIES OF DAILY LIVING (ADL): BATHING_REQUIRES_ASSISTANCE: 0

## 2020-03-30 ASSESSMENT — FIBROSIS 4 INDEX: FIB4 SCORE: 1.56

## 2020-03-30 NOTE — PROGRESS NOTES
"Chief Complaint   Patient presents with   • Annual Exam     AWV     HPI:  Malou is a 69 y.o. here for Medicare Annual Wellness Visit. She is unaccompanied for today's visit.    Problem   Mixed Hyperlipidemia    This is a new problem and she has never been on statin medication in the past.  [facility] decision aid for starting statin medication showed 10 year ASCVD risk of 8%, improved to 5-6% with initiation of high and stand potency statin therapy.     We tried starting atorvastatin 20 mg and she developed side effects and these persisted at 10 mg so she stopped the medicine altogether. No personal history of cardiovascular or cerebrovascular disease.       Ref. Range 5/15/2019 06:35   Cholesterol,Tot Latest Ref Range: 100 - 199 mg/dL 253 (H)   Triglycerides Latest Ref Range: 0 - 149 mg/dL 48   HDL Latest Ref Range: >=40 mg/dL 92   LDL Latest Ref Range: <100 mg/dL 151 (H)        Venous Insufficiency of Both Lower Extremities    She reports challenges with intermittent swelling of the lower legs, right greater than left, and has seen [facility] (Dr. Torres) in the past.  She shares that her left saphenous was \"closed\" in 2012.  She thinks she may have had sclerotherapy on the left lower extremity and also completed a vein procedure in August 2019 on the right leg.  She notes ongoing challenges with pain and swelling of veins and would like to obtain a second opinion.     Hypothyroidism       Ref. Range 5/15/2019 06:35   TSH Latest Ref Range: 0.380 - 5.330 uIU/mL 2.810       She has maintained on levothyroxine with stable thyroid numbers.  She is currently utilizing 75 mcg daily.  No signs or symptoms of over or under treatment at this time.  She does have some chronic constipation and is having bowel movements about every day or every other day.  Otherwise no tremor, changes in hair or nails, or palpitations.         Patient Active Problem List    Diagnosis Date Noted   • Mixed hyperlipidemia 09/25/2019   • " Venous insufficiency of both lower extremities 05/14/2019   • History of primary hyperparathyroidism 10/16/2018   • Postmenopausal HRT (hormone replacement therapy) 10/16/2018   • Anxiety and depression 01/09/2018   • Primary osteoarthritis involving multiple joints 10/05/2016   • Migraines 07/29/2013   • Hypothyroidism 12/28/2012   • History of shingles 12/28/2012       Current Outpatient Medications   Medication Sig Dispense Refill   • estradiol (ESTRACE) 0.5 MG tablet Take 1/2 tab daily 45 Tab 2   • SYNTHROID 75 MCG Tab Take 1 Tab by mouth Every morning on an empty stomach. 100 Tab 3   • escitalopram (LEXAPRO) 20 MG tablet TAKE 1 TABLET BY MOUTH EVERY DAY 90 Tab 3   • celecoxib (CELEBREX) 200 MG Cap TAKE 1 CAPSULE BY MOUTH EVERY DAY 90 Cap 3   • Omega-3 Fatty Acids (FISH OIL) 1200 MG Cap Take  by mouth.     • fexofenadine (ALLEGRA) 30 MG tablet Take 30 mg by mouth.     • cyclobenzaprine (FLEXERIL) 10 MG Tab Take 1 Tab by mouth 3 times a day as needed. 45 Tab 1   • conjugated estrogen (PREMARIN) 0.625 MG/GM Cream Insert 0.5 g in vagina every 7 days. 1 Tube 3   • sumatriptan (IMITREX) 50 MG Tab Take 50 mg by mouth Once PRN.     • Calcium Carb-Cholecalciferol (CALCIUM 1000 + D PO) Take 1 Cap by mouth 1 time daily as needed.     • celecoxib (CELEBREX) 200 MG Cap Take 200 mg by mouth.     • escitalopram (LEXAPRO) 10 MG Tab Take 10 mg by mouth.     • levothyroxine (SYNTHROID) 75 MCG Tab Take 75 mcg by mouth.     • albuterol 108 (90 Base) MCG/ACT Aero Soln inhalation aerosol Inhale 2 Puffs by mouth.       No current facility-administered medications for this visit.         Patient is taking medications as noted in medication list.  Current supplements as per medication list.     Allergies: Formaldehyde; Neomycin; Nickel; Quaternium-15; and Sulfate    Current social contact/activities: Pt reports play games, paint, write.     Is patient current with immunizations? Yes.    She  reports that she has never smoked. She has  never used smokeless tobacco. She reports current alcohol use of about 0.6 oz of alcohol per week. She reports that she does not use drugs.  Counseling given: Not Answered        DPA/Advanced directive: Patient has Advanced Directive, but it is not on file. Instructed to bring in a copy to scan into their chart.    ROS:    Gait: Uses no assistive device   Ostomy: No   Other tubes: No   Amputations: No   Chronic oxygen use No   Last eye exam Pt reports about 2 months ago   Wears hearing aids: No   : Reports urinary leakage during the last 6 months that has not interfered at all with their daily activities or sleep.    Screening:    Depression Screening    Little interest or pleasure in doing things?  0 - not at all  Feeling down, depressed, or hopeless? 0 - not at all  Patient Health Questionnaire Score: 0    If depressive symptoms identified deferred to follow up visit unless specifically addressed in assessment and plan.    Interpretation of PHQ-9 Total Score   Score Severity   1-4 No Depression   5-9 Mild Depression   10-14 Moderate Depression   15-19 Moderately Severe Depression   20-27 Severe Depression    Screening for Cognitive Impairment    Three Minute Recall (village, kitchen, baby)  3/3 3/3  [place] clock face with all 12 numbers and set the hands to show 10 past 10.  Yes 5/5  If cognitive concerns identified, deferred for follow up unless specifically addressed in assessment and plan.    Fall Risk Assessment    Has the patient had two or more falls in the last year or any fall with injury in the last year?  No  If fall risk identified, deferred for follow up unless specifically addressed in assessment and plan.    Safety Assessment    Throw rugs on floor.  Yes  Handrails on all stairs.  Yes  Good lighting in all hallways.  Yes  Difficulty hearing.  Yes  Patient counseled about all safety risks that were identified.    Functional Assessment ADLs    Are there any barriers preventing you from cooking for  yourself or meeting nutritional needs?  No.    Are there any barriers preventing you from driving safely or obtaining transportation?  No.    Are there any barriers preventing you from using a telephone or calling for help?  No.    Are there any barriers preventing you from shopping?  No.    Are there any barriers preventing you from taking care of your own finances?  No.    Are there any barriers preventing you from managing your medications?  No.    Are there any barriers preventing you from showering, bathing or dressing yourself?  No.    Are you currently engaging in any exercise or physical activity?  Yes.  Pt reports walking an hour around her neighborhood   What is your perception of your health?  Good.    Health Maintenance Summary                Annual Wellness Visit Overdue 1950     HEPATITIS C SCREENING Overdue 1950     IMM DTaP/Tdap/Td Vaccine Postponed 3/30/2021 Originally 6/14/1961. Insurance/Financial     Done 4/1/2007 Imm Admin: TD Vaccine    MAMMOGRAM Next Due 3/9/2021      Done 3/9/2020 MA-DIAGNOSTIC MAMMO BILAT W/TOMOSYNTHESIS W/CAD     Patient has more history with this topic...    COLONOSCOPY Next Due 9/23/2023      Done 9/23/2013     BONE DENSITY Next Due 10/25/2023      Done 10/25/2018 DS-BONE DENSITY STUDY (DEXA)          Patient Care Team:  Melba Hamilton D.O. as PCP - General (Internal Medicine)    Social History     Tobacco Use   • Smoking status: Never Smoker   • Smokeless tobacco: Never Used   Substance Use Topics   • Alcohol use: Yes     Alcohol/week: 0.6 oz     Types: 1 Glasses of wine per week     Frequency: 2-4 times a month     Drinks per session: 1 or 2     Binge frequency: Never     Comment: once a week   • Drug use: No     Family History   Problem Relation Age of Onset   • Alzheimer's Disease Mother    • Other Father         ALS   • No Known Problems Sister    • No Known Problems Brother    • No Known Problems Maternal Aunt      She  has a past medical history of  "Anxiety, Cataract, Hyperlipidemia, Migraine, Parathyroid disorder (HCC), and Thyroid disease. She also has no past medical history of Encounter for long-term (current) use of other medications.   Past Surgical History:   Procedure Laterality Date   • ABDOMINAL HYSTERECTOMY TOTAL     • EYE SURGERY     • HYSTERECTOMY, VAGINAL     • KNEE REPLACEMENT, TOTAL     • LYSIS ADHESIONS GENERAL      vertical low abdominal   • PARATHYROIDECTOMY         Exam:     /79   Pulse 64   Temp 36.8 °C (98.3 °F) (Temporal)   Ht 1.753 m (5' 9\")   Wt 65.5 kg (144 lb 6.4 oz)   SpO2 97%  Body mass index is 21.32 kg/m².    Hearing good.    Dentition good  General: Alert, oriented in no acute distress.  HEENT: Eye contact is good, speech goal directed, affect calm  Cardiovascular: RRR, no murmur  Pulmonary: CTAB, no w/r/r  Abdomen: Soft, nontender/nondistended    Assessment and Plan. The following treatment and monitoring plan is recommended:    1. Hypothyroidism, unspecified type  CBC WITH DIFFERENTIAL    Comp Metabolic Panel    TSH WITH REFLEX TO FT4   2. Mixed hyperlipidemia  CBC WITH DIFFERENTIAL    Comp Metabolic Panel    Lipid Profile   3. Encounter for hepatitis C screening test for low risk patient  HEP C VIRUS ANTIBODY   4. Venous insufficiency of both lower extremities  REFERRAL TO VASCULAR SURGERY     Mixed hyperlipidemia  Chronic and ongoing problem.  She does not tolerate statin therapy, at least atorvastatin, and declines additional treatment at this time.  We discussed that she could complete a CT coronary calcium score in the future if she would really like to better understand if she has clinically significant atherosclerosis.  She will think about it and let me know.    Hypothyroidism  Chronic and stable problem.  Appropriate to continue levothyroxine 75 mcg daily.    Venous insufficiency of both lower extremities  Chronic ongoing problem.  Patient would like to meet with a vascular surgeon for a second opinion.  Her " current practitioner is interventional radiologist and she wonders if she might need something more invasive to help treat her pains and would like a more in-depth discussion.  Will refer her to Dr. Vu with vascular surgery.    Services suggested: No services needed at this time  Health Care Screening recommendations as per orders if indicated.  Referrals offered: PT/OT/Nutrition counseling/Behavioral Health/Smoking cessation as per orders if indicated.    Discussion today about general wellness and lifestyle habits:    · Prevent falls and reduce trip hazards; Cautioned about securing or removing rugs.  · Have a working fire alarm and carbon monoxide detector;   · Engage in regular physical activity and social activities       Follow-up: Return in about 1 year (around 3/30/2021) for awv.

## 2020-03-30 NOTE — ASSESSMENT & PLAN NOTE
Chronic and ongoing problem.  She does not tolerate statin therapy, at least atorvastatin, and declines additional treatment at this time.  We discussed that she could complete a CT coronary calcium score in the future if she would really like to better understand if she has clinically significant atherosclerosis.  She will think about it and let me know.

## 2020-03-30 NOTE — ASSESSMENT & PLAN NOTE
Chronic ongoing problem.  Patient would like to meet with a vascular surgeon for a second opinion.  Her current practitioner is interventional radiologist and she wonders if she might need something more invasive to help treat her pains and would like a more in-depth discussion.  Will refer her to Dr. Vu with vascular surgery.

## 2020-04-29 ENCOUNTER — TELEPHONE (OUTPATIENT)
Dept: MEDICAL GROUP | Facility: MEDICAL CENTER | Age: 70
End: 2020-04-29

## 2020-04-29 NOTE — TELEPHONE ENCOUNTER
1. Caller Name: Malou Pretty                          Call Back Number: [phone] (home)         How would the patient prefer to be contacted with a response: Phone call OK to leave a detailed message    I spoke with malou.  She prescott been on phone with [facility] .  Was glad I called  to confirm Dr. Joy Suarez ordered diagnostic breast

## 2020-05-16 ENCOUNTER — HOSPITAL ENCOUNTER (OUTPATIENT)
Dept: LAB | Facility: MEDICAL CENTER | Age: 70
End: 2020-05-16
Attending: INTERNAL MEDICINE
Payer: MEDICARE

## 2020-05-16 DIAGNOSIS — M15.9 PRIMARY OSTEOARTHRITIS INVOLVING MULTIPLE JOINTS: ICD-10-CM

## 2020-05-16 DIAGNOSIS — B02.9 HERPES ZOSTER WITHOUT COMPLICATION: ICD-10-CM

## 2020-05-16 DIAGNOSIS — Z11.59 ENCOUNTER FOR HEPATITIS C SCREENING TEST FOR LOW RISK PATIENT: ICD-10-CM

## 2020-05-16 DIAGNOSIS — E78.2 MIXED HYPERLIPIDEMIA: ICD-10-CM

## 2020-05-16 DIAGNOSIS — E03.9 HYPOTHYROIDISM, UNSPECIFIED TYPE: ICD-10-CM

## 2020-05-16 LAB
ALBUMIN SERPL BCP-MCNC: 4.2 G/DL (ref 3.2–4.9)
ALBUMIN/GLOB SERPL: 1.5 G/DL
ALP SERPL-CCNC: 21 U/L (ref 30–99)
ALT SERPL-CCNC: 23 U/L (ref 2–50)
ANION GAP SERPL CALC-SCNC: 9 MMOL/L (ref 7–16)
AST SERPL-CCNC: 27 U/L (ref 12–45)
BASOPHILS # BLD AUTO: 1.1 % (ref 0–1.8)
BASOPHILS # BLD: 0.07 K/UL (ref 0–0.12)
BILIRUB SERPL-MCNC: 0.4 MG/DL (ref 0.1–1.5)
BUN SERPL-MCNC: 17 MG/DL (ref 8–22)
CALCIUM SERPL-MCNC: 8.5 MG/DL (ref 8.5–10.5)
CHLORIDE SERPL-SCNC: 93 MMOL/L (ref 96–112)
CHOLEST SERPL-MCNC: 252 MG/DL (ref 100–199)
CO2 SERPL-SCNC: 27 MMOL/L (ref 20–33)
CREAT SERPL-MCNC: 0.86 MG/DL (ref 0.5–1.4)
EOSINOPHIL # BLD AUTO: 0.21 K/UL (ref 0–0.51)
EOSINOPHIL NFR BLD: 3.2 % (ref 0–6.9)
ERYTHROCYTE [DISTWIDTH] IN BLOOD BY AUTOMATED COUNT: 47.4 FL (ref 35.9–50)
FASTING STATUS PATIENT QL REPORTED: NORMAL
GLOBULIN SER CALC-MCNC: 2.8 G/DL (ref 1.9–3.5)
GLUCOSE SERPL-MCNC: 84 MG/DL (ref 65–99)
HCT VFR BLD AUTO: 42.6 % (ref 37–47)
HCV AB SER QL: NORMAL
HDLC SERPL-MCNC: 99 MG/DL
HGB BLD-MCNC: 14.6 G/DL (ref 12–16)
IMM GRANULOCYTES # BLD AUTO: 0.01 K/UL (ref 0–0.11)
IMM GRANULOCYTES NFR BLD AUTO: 0.2 % (ref 0–0.9)
LDLC SERPL CALC-MCNC: 143 MG/DL
LYMPHOCYTES # BLD AUTO: 2.73 K/UL (ref 1–4.8)
LYMPHOCYTES NFR BLD: 42 % (ref 22–41)
MCH RBC QN AUTO: 31.3 PG (ref 27–33)
MCHC RBC AUTO-ENTMCNC: 34.3 G/DL (ref 33.6–35)
MCV RBC AUTO: 91.4 FL (ref 81.4–97.8)
MONOCYTES # BLD AUTO: 0.7 K/UL (ref 0–0.85)
MONOCYTES NFR BLD AUTO: 10.8 % (ref 0–13.4)
NEUTROPHILS # BLD AUTO: 2.78 K/UL (ref 2–7.15)
NEUTROPHILS NFR BLD: 42.7 % (ref 44–72)
NRBC # BLD AUTO: 0 K/UL
NRBC BLD-RTO: 0 /100 WBC
PLATELET # BLD AUTO: 207 K/UL (ref 164–446)
PMV BLD AUTO: 9.8 FL (ref 9–12.9)
POTASSIUM SERPL-SCNC: 4.2 MMOL/L (ref 3.6–5.5)
PROT SERPL-MCNC: 7 G/DL (ref 6–8.2)
RBC # BLD AUTO: 4.66 M/UL (ref 4.2–5.4)
SODIUM SERPL-SCNC: 129 MMOL/L (ref 135–145)
TRIGL SERPL-MCNC: 51 MG/DL (ref 0–149)
TSH SERPL DL<=0.005 MIU/L-ACNC: 2.98 UIU/ML (ref 0.38–5.33)
WBC # BLD AUTO: 6.5 K/UL (ref 4.8–10.8)

## 2020-05-16 PROCEDURE — 80053 COMPREHEN METABOLIC PANEL: CPT

## 2020-05-16 PROCEDURE — 36415 COLL VENOUS BLD VENIPUNCTURE: CPT

## 2020-05-16 PROCEDURE — 80061 LIPID PANEL: CPT

## 2020-05-16 PROCEDURE — 84443 ASSAY THYROID STIM HORMONE: CPT

## 2020-05-16 PROCEDURE — 85025 COMPLETE CBC W/AUTO DIFF WBC: CPT

## 2020-05-16 PROCEDURE — 86803 HEPATITIS C AB TEST: CPT

## 2020-05-17 DIAGNOSIS — E87.1 HYPONATREMIA: ICD-10-CM

## 2020-05-17 NOTE — RESULT ENCOUNTER NOTE
Chito Westfall,    I received your labs to review.    Hepatitis C was negative, no prior exposure or infection to this virus.  Thyroid levels appear stable.    Cholesterol is about the same compared to last year with a slight decrease of the total and bad cholesterol.  Since you did not tolerate the atorvastatin we have a few options, you could try over-the-counter red yeast rice extract which is a natural supplement that can help lower cholesterol levels, starting dose would be 600 mg twice daily and if you do not have any problems with that you can go up to 1200 mg twice daily.  Another approach would be to try a different statin medication such as simvastatin since the atorvastatin gave you problems.  Let me know how you would like to proceed.    Your sodium and chloride levels have both come down some.  Your sodium levels have been on the low end historically but never quite this low before.  The sodium is actually reflection of water balance in the body, we can see this for a multitude of reasons including medication side effects, fluid retention, or kidney problems.      We should recheck this with you nonfasting in 1 week to make sure it is not getting any worse and hopefully it will have actually improved some, and if not then we will need to perform additional evaluation as to why your sodium level is decreasing. The blood order will be in the system. The remaining electrolytes, kidney function, liver function are all normal.    Blood counts are stable, no anemia.      Let me know if you have any follow up questions for me.    Thanks,  Dr. Hamilton

## 2020-05-26 ENCOUNTER — HOSPITAL ENCOUNTER (OUTPATIENT)
Dept: LAB | Facility: MEDICAL CENTER | Age: 70
End: 2020-05-26
Attending: INTERNAL MEDICINE
Payer: MEDICARE

## 2020-05-26 DIAGNOSIS — E87.1 HYPONATREMIA: ICD-10-CM

## 2020-05-26 LAB
ANION GAP SERPL CALC-SCNC: 9 MMOL/L (ref 7–16)
BUN SERPL-MCNC: 15 MG/DL (ref 8–22)
CALCIUM SERPL-MCNC: 8.4 MG/DL (ref 8.5–10.5)
CHLORIDE SERPL-SCNC: 99 MMOL/L (ref 96–112)
CO2 SERPL-SCNC: 25 MMOL/L (ref 20–33)
CREAT SERPL-MCNC: 0.83 MG/DL (ref 0.5–1.4)
GLUCOSE SERPL-MCNC: 89 MG/DL (ref 65–99)
POTASSIUM SERPL-SCNC: 4.5 MMOL/L (ref 3.6–5.5)
SODIUM SERPL-SCNC: 133 MMOL/L (ref 135–145)

## 2020-05-26 PROCEDURE — 36415 COLL VENOUS BLD VENIPUNCTURE: CPT

## 2020-05-26 PROCEDURE — 80048 BASIC METABOLIC PNL TOTAL CA: CPT

## 2020-08-08 DIAGNOSIS — Z78.0 POST-MENOPAUSAL: ICD-10-CM

## 2020-08-10 RX ORDER — ESTRADIOL 0.5 MG/1
TABLET ORAL
Qty: 45 TAB | Refills: 2 | Status: SHIPPED | OUTPATIENT
Start: 2020-08-10 | End: 2021-08-27 | Stop reason: SDUPTHER

## 2020-08-24 ENCOUNTER — OFFICE VISIT (OUTPATIENT)
Dept: MEDICAL GROUP | Facility: MEDICAL CENTER | Age: 70
End: 2020-08-24
Payer: MEDICARE

## 2020-08-24 VITALS
BODY MASS INDEX: 20.87 KG/M2 | DIASTOLIC BLOOD PRESSURE: 72 MMHG | WEIGHT: 140.87 LBS | HEART RATE: 74 BPM | SYSTOLIC BLOOD PRESSURE: 140 MMHG | OXYGEN SATURATION: 97 % | TEMPERATURE: 97.9 F | HEIGHT: 69 IN

## 2020-08-24 DIAGNOSIS — R53.83 FATIGUE, UNSPECIFIED TYPE: ICD-10-CM

## 2020-08-24 DIAGNOSIS — E78.2 MIXED HYPERLIPIDEMIA: ICD-10-CM

## 2020-08-24 DIAGNOSIS — M15.9 PRIMARY OSTEOARTHRITIS INVOLVING MULTIPLE JOINTS: ICD-10-CM

## 2020-08-24 DIAGNOSIS — R19.09 LUMP IN THE GROIN: ICD-10-CM

## 2020-08-24 PROCEDURE — 99214 OFFICE O/P EST MOD 30 MIN: CPT | Performed by: INTERNAL MEDICINE

## 2020-08-24 RX ORDER — CELECOXIB 200 MG/1
200 CAPSULE ORAL DAILY
COMMUNITY
Start: 2020-05-28 | End: 2020-08-24 | Stop reason: SDUPTHER

## 2020-08-24 RX ORDER — CYCLOBENZAPRINE HCL 10 MG
10 TABLET ORAL 3 TIMES DAILY PRN
Qty: 30 TAB | Refills: 3 | Status: SHIPPED | OUTPATIENT
Start: 2020-08-24 | End: 2021-03-04 | Stop reason: SDUPTHER

## 2020-08-24 RX ORDER — CELECOXIB 200 MG/1
200 CAPSULE ORAL DAILY
Qty: 90 CAP | Refills: 3 | Status: SHIPPED | OUTPATIENT
Start: 2020-08-24 | End: 2021-03-15

## 2020-08-24 ASSESSMENT — PATIENT HEALTH QUESTIONNAIRE - PHQ9
3. TROUBLE FALLING OR STAYING ASLEEP OR SLEEPING TOO MUCH: MORE THAN HALF THE DAYS
4. FEELING TIRED OR HAVING LITTLE ENERGY: NEARLY EVERY DAY
6. FEELING BAD ABOUT YOURSELF - OR THAT YOU ARE A FAILURE OR HAVE LET YOURSELF OR YOUR FAMILY DOWN: NOT AL ALL
9. THOUGHTS THAT YOU WOULD BE BETTER OFF DEAD, OR OF HURTING YOURSELF: NOT AT ALL
1. LITTLE INTEREST OR PLEASURE IN DOING THINGS: NOT AT ALL
2. FEELING DOWN, DEPRESSED, IRRITABLE, OR HOPELESS: SEVERAL DAYS
5. POOR APPETITE OR OVEREATING: SEVERAL DAYS
8. MOVING OR SPEAKING SO SLOWLY THAT OTHER PEOPLE COULD HAVE NOTICED. OR THE OPPOSITE, BEING SO FIGETY OR RESTLESS THAT YOU HAVE BEEN MOVING AROUND A LOT MORE THAN USUAL: NOT AT ALL
SUM OF ALL RESPONSES TO PHQ9 QUESTIONS 1 AND 2: 1

## 2020-08-24 ASSESSMENT — FIBROSIS 4 INDEX: FIB4 SCORE: 1.96

## 2020-08-25 NOTE — ASSESSMENT & PLAN NOTE
Relatively new problem.  We will check some labs to make sure her low sodium has not returned and also follow-up on her B12 level.  If her labs are all normal then can proceed with an overnight pulse oximetry to screen for nocturnal hypoxia that could be contributing.  She agrees.

## 2020-08-25 NOTE — ASSESSMENT & PLAN NOTE
Chronic and ongoing problem. Continue Celebrex 200 mg daily and as needed flexeril 10 mg (averaging a few times per month).

## 2020-08-25 NOTE — ASSESSMENT & PLAN NOTE
New problem by my evaluation, present for some time. No clear correlate on exam today except for a small lymph node in the right inguinal area. She will continue to monitor, no imaging at this time but will have a low threshold for CT or ultrasound imaging especially if the lump she feels starts to grow or become painful. She agrees.

## 2020-08-25 NOTE — PROGRESS NOTES
Subjective:   Chief Complaint/History of Present Illness:  Malou Pretty is a 70 y.o. female established patient who presents today to discuss medical problems as listed below. She is unaccompanied for today's visit.    Problem   Fatigue    She reports worsening of chronic fatigue.  She finds that she is sleeping more at night and even having to nap up to an hour each week.  She has history of low thyroid and is taking Synthroid with appropriate stability of her thyroid labs.  She has never had a B12 check but is taking a super B complex.  No prior pulse oximetry.  She does use sleep aids but then feels groggy the next morning admits to using excess caffeine to help wake herself up.  She has a history of low sodium, improved on most recent check.     Lump in The Groin    She reports insidious presentation of a lump in the right lower groin. Sometimes she notes it when standing. On our visit today it appears flat which she agrees. She has a history of a lower abdominal surgery around 5089-5483 for lysis of adhesions with a well healed vertical incision. She has also noted slow onset of diastasis recti inferior to the umbilicus. She followed up with general surgery who was not concerned for any hernia. No issues with enlarged lymph nodes in the past.     Mixed Hyperlipidemia    This is a new problem and she has never been on statin medication in the past.  [facility] decision aid for starting statin medication showed 10 year ASCVD risk of 8%, improved to 5-6% with initiation of high and stand potency statin therapy.     We tried starting atorvastatin 20 mg and she developed side effects and these persisted at 10 mg so she stopped the medicine altogether. No personal history of cardiovascular or cerebrovascular disease.  She is now on red yeast rice extract 1200 mg twice daily and coenzyme every 10 300 mg daily.       Ref. Range 5/15/2019 06:35   Cholesterol,Tot Latest Ref Range: 100 - 199 mg/dL 253 (H)   Triglycerides  Latest Ref Range: 0 - 149 mg/dL 48   HDL Latest Ref Range: >=40 mg/dL 92   LDL Latest Ref Range: <100 mg/dL 151 (H)        Primary Osteoarthritis Involving Multiple Joints    She reports challenges with joint pains in the right shoulder, bilateral hands, and right knee.  She is status post right shoulder arthroscopy and right total knee replacement.  She was previously utilizing meloxicam but this led to dyspepsia and she was transitioned onto Celebrex which she is utilizing every other day.  It does cause stomach irritation occasionally but she has no history of GI bleed and denies current melena, hematochezia, or abdominal pain.  At this point she has had the shoulder and the knee operated on but with her hands will still notice stiffness and swelling if she does not take the Celebrex.    Current regimen: Celebrex 200 mg daily and flexeril 10 mg prn (few times per month)          Additional History:   Allergies:    Formaldehyde, Neomycin, Nickel, Quaternium-15, and Sulfate     Current Medications:     Current Outpatient Medications   Medication Sig Dispense Refill   • celecoxib (CELEBREX) 200 MG Cap Take 1 Cap by mouth every day. 90 Cap 3   • cyclobenzaprine (FLEXERIL) 10 MG Tab Take 1 Tab by mouth 3 times a day as needed for Muscle Spasms. 30 Tab 3   • estradiol (ESTRACE) 0.5 MG tablet TAKE 1/2 TABLET BY MOUTH DAILY 45 Tab 2   • SYNTHROID 75 MCG Tab Take 1 Tab by mouth Every morning on an empty stomach. 100 Tab 3   • escitalopram (LEXAPRO) 20 MG tablet TAKE 1 TABLET BY MOUTH EVERY DAY 90 Tab 3   • albuterol 108 (90 Base) MCG/ACT Aero Soln inhalation aerosol Inhale 2 Puffs by mouth.     • Omega-3 Fatty Acids (FISH OIL) 1200 MG Cap Take  by mouth.     • fexofenadine (ALLEGRA) 30 MG tablet Take 30 mg by mouth.     • conjugated estrogen (PREMARIN) 0.625 MG/GM Cream Insert 0.5 g in vagina every 7 days. 1 Tube 3   • sumatriptan (IMITREX) 50 MG Tab Take 50 mg by mouth Once PRN.     • Calcium Carb-Cholecalciferol  "(CALCIUM 1000 + D PO) Take 1 Cap by mouth 1 time daily as needed.       No current facility-administered medications for this visit.         Social History:     Social History     Tobacco Use   • Smoking status: Never Smoker   • Smokeless tobacco: Never Used   Substance Use Topics   • Alcohol use: Yes     Alcohol/week: 0.6 oz     Types: 1 Glasses of wine per week     Frequency: 2-4 times a month     Drinks per session: 1 or 2     Binge frequency: Never     Comment: once a week   • Drug use: No       ROS: As above in the HPI      Objective:   Physical Exam:    Vitals: /72   Pulse 74   Temp 36.6 °C (97.9 °F) (Temporal)   Ht 1.753 m (5' 9\")   Wt 63.9 kg (140 lb 14 oz)   SpO2 97%    BMI: Body mass index is 20.8 kg/m².   General/Constitutional: Vitals as above, Well nourished, well developed female in no acute distress   Head/Eyes: Head is grossly normal & atraumatic, bilateral conjunctivae clear and not injected, bilateral EOMI   Respiratory: No respiratory distress, no cough   Abdomen: Mild diastasis recti most notable at the right inferior aspect of the umbilicus. Palpable lymph node in right inguinal area. Mild protuberance of lower abdomen.    MSK: Gait grossly normal, she has chronic arthritic pain and muscle spasms.   Integumentary: She has small punctate red lesions on palms of hands that is resolving. Small dermatitis areas along the posterior bra line. No surrounding erythema or fluctuance.   Psych: Judgment grossly appropriate, no apparent depression/anxiety    Health Maintenance:     - Completed      Assessment and Plan:     Problem List Items Addressed This Visit     Primary osteoarthritis involving multiple joints     Chronic and ongoing problem. Continue Celebrex 200 mg daily and as needed flexeril 10 mg (averaging a few times per month).         Relevant Medications    celecoxib (CELEBREX) 200 MG Cap    cyclobenzaprine (FLEXERIL) 10 MG Tab    Mixed hyperlipidemia     Previous problem that " requires follow-up.  She unfortunately did not tolerate the low-dose Lipitor.  She is now on red yeast rice extract 1200 mg twice daily coenzyme every 10 and is tolerating without difficulty.  We will update her lipids and liver function to ensure improvement on this regimen.         Relevant Orders    Lipid Profile    Comp Metabolic Panel    Fatigue     Relatively new problem.  We will check some labs to make sure her low sodium has not returned and also follow-up on her B12 level.  If her labs are all normal then can proceed with an overnight pulse oximetry to screen for nocturnal hypoxia that could be contributing.  She agrees.         Relevant Orders    VITAMIN B12    Lump in the groin     New problem by my evaluation, present for some time. No clear correlate on exam today except for a small lymph node in the right inguinal area. She will continue to monitor, no imaging at this time but will have a low threshold for CT or ultrasound imaging especially if the lump she feels starts to grow or become painful. She agrees.                  RTC: March 2021 or sooner as needed.    PLEASE NOTE: This dictation was created using voice recognition software. I have made every reasonable attempt to correct obvious errors, but I expect that there are errors of grammar and possibly content that I did not discover before finalizing the note.

## 2020-08-25 NOTE — ASSESSMENT & PLAN NOTE
Previous problem that requires follow-up.  She unfortunately did not tolerate the low-dose Lipitor.  She is now on red yeast rice extract 1200 mg twice daily coenzyme every 10 and is tolerating without difficulty.  We will update her lipids and liver function to ensure improvement on this regimen.

## 2020-09-04 ENCOUNTER — HOSPITAL ENCOUNTER (OUTPATIENT)
Dept: LAB | Facility: MEDICAL CENTER | Age: 70
End: 2020-09-04
Attending: INTERNAL MEDICINE
Payer: MEDICARE

## 2020-09-04 DIAGNOSIS — R53.83 FATIGUE, UNSPECIFIED TYPE: ICD-10-CM

## 2020-09-04 DIAGNOSIS — E78.2 MIXED HYPERLIPIDEMIA: ICD-10-CM

## 2020-09-04 LAB
ALBUMIN SERPL BCP-MCNC: 4.3 G/DL (ref 3.2–4.9)
ALBUMIN/GLOB SERPL: 1.4 G/DL
ALP SERPL-CCNC: 21 U/L (ref 30–99)
ALT SERPL-CCNC: 23 U/L (ref 2–50)
ANION GAP SERPL CALC-SCNC: 9 MMOL/L (ref 7–16)
AST SERPL-CCNC: 26 U/L (ref 12–45)
BILIRUB SERPL-MCNC: 0.4 MG/DL (ref 0.1–1.5)
BUN SERPL-MCNC: 12 MG/DL (ref 8–22)
CALCIUM SERPL-MCNC: 8.9 MG/DL (ref 8.5–10.5)
CHLORIDE SERPL-SCNC: 97 MMOL/L (ref 96–112)
CHOLEST SERPL-MCNC: 254 MG/DL (ref 100–199)
CO2 SERPL-SCNC: 28 MMOL/L (ref 20–33)
CREAT SERPL-MCNC: 0.83 MG/DL (ref 0.5–1.4)
GLOBULIN SER CALC-MCNC: 3 G/DL (ref 1.9–3.5)
GLUCOSE SERPL-MCNC: 104 MG/DL (ref 65–99)
HDLC SERPL-MCNC: 112 MG/DL
LDLC SERPL CALC-MCNC: 132 MG/DL
POTASSIUM SERPL-SCNC: 4.1 MMOL/L (ref 3.6–5.5)
PROT SERPL-MCNC: 7.3 G/DL (ref 6–8.2)
SODIUM SERPL-SCNC: 134 MMOL/L (ref 135–145)
TRIGL SERPL-MCNC: 50 MG/DL (ref 0–149)
VIT B12 SERPL-MCNC: 1205 PG/ML (ref 211–911)

## 2020-09-04 PROCEDURE — 36415 COLL VENOUS BLD VENIPUNCTURE: CPT

## 2020-09-04 PROCEDURE — 82607 VITAMIN B-12: CPT

## 2020-09-04 PROCEDURE — 80061 LIPID PANEL: CPT

## 2020-09-04 PROCEDURE — 80053 COMPREHEN METABOLIC PANEL: CPT

## 2020-09-10 NOTE — RESULT ENCOUNTER NOTE
Chito Westfall,    I have your lab results.    B12 level is more than adequate, if you are taking supplements then you could decrease to every other day (as your body can only absorb so much of this vitamin and then you pee out the extra).    Electrolytes, kidney function, and liver function all appears stable. Sodium still mildly decreased, but stable for you.  Cholesterol panel looks improved on the red yeast rice extract.    Thanks,  Dr. Hamilton

## 2020-12-21 ENCOUNTER — TELEPHONE (OUTPATIENT)
Dept: MEDICAL GROUP | Facility: MEDICAL CENTER | Age: 70
End: 2020-12-21

## 2020-12-22 ENCOUNTER — TELEPHONE (OUTPATIENT)
Dept: MEDICAL GROUP | Facility: MEDICAL CENTER | Age: 70
End: 2020-12-22

## 2020-12-23 NOTE — TELEPHONE ENCOUNTER
FINAL PRIOR AUTHORIZATION STATUS:    1.  Name of Medication & Dose: Cyclobenzaprine 10mg     2. Prior Auth Status: Approved through 12/20/2021     3. Action Taken: Pharmacy Notified: no Patient Notified: no

## 2021-01-15 DIAGNOSIS — Z23 NEED FOR VACCINATION: ICD-10-CM

## 2021-01-18 ENCOUNTER — APPOINTMENT (OUTPATIENT)
Dept: MEDICAL GROUP | Facility: PHYSICIAN GROUP | Age: 71
End: 2021-01-18
Payer: MEDICARE

## 2021-01-19 ENCOUNTER — TELEPHONE (OUTPATIENT)
Dept: MEDICAL GROUP | Facility: PHYSICIAN GROUP | Age: 71
End: 2021-01-19

## 2021-01-19 NOTE — TELEPHONE ENCOUNTER
ESTABLISHED PATIENT PRE-VISIT PLANNING     Patient was NOT contacted to complete PVP.     Note: Patient will not be contacted if there is no indication to call.     1.  Reviewed notes from the last few office visits within the medical group: Yes    2.  If any orders were placed at last visit or intended to be done for this visit (i.e. 6 mos follow-up), do we have Results/Consult Notes?         •  Labs - Labs ordered, completed on 09/04/20 and results are in chart.  Note: If patient appointment is for lab review and patient did not complete labs, check with provider if OK to reschedule patient until labs completed.       •  Imaging - Imaging was not ordered at last office visit.       •  Referrals - No referrals were ordered at last office visit.    3. Is this appointment scheduled as a Hospital Follow-Up? No    4.  Immunizations were updated in Epic using Reconcile Outside Information activity? Yes    5.  Patient is due for the following Health Maintenance Topics:   There are no preventive care reminders to display for this patient.    6.  AHA (Pulse[employer]) form printed for Provider? Email sent to [facility] requesting form

## 2021-01-21 ENCOUNTER — OFFICE VISIT (OUTPATIENT)
Dept: MEDICAL GROUP | Facility: PHYSICIAN GROUP | Age: 71
End: 2021-01-21
Payer: MEDICARE

## 2021-01-21 VITALS
HEIGHT: 70 IN | TEMPERATURE: 97 F | DIASTOLIC BLOOD PRESSURE: 80 MMHG | OXYGEN SATURATION: 97 % | BODY MASS INDEX: 20.24 KG/M2 | RESPIRATION RATE: 18 BRPM | SYSTOLIC BLOOD PRESSURE: 138 MMHG | WEIGHT: 141.4 LBS | HEART RATE: 74 BPM

## 2021-01-21 DIAGNOSIS — M25.562 CHRONIC PAIN OF LEFT KNEE: ICD-10-CM

## 2021-01-21 DIAGNOSIS — M15.9 PRIMARY OSTEOARTHRITIS INVOLVING MULTIPLE JOINTS: ICD-10-CM

## 2021-01-21 DIAGNOSIS — G89.29 CHRONIC PAIN OF LEFT KNEE: ICD-10-CM

## 2021-01-21 PROCEDURE — 99214 OFFICE O/P EST MOD 30 MIN: CPT | Performed by: INTERNAL MEDICINE

## 2021-01-21 ASSESSMENT — FIBROSIS 4 INDEX: FIB4 SCORE: 1.89

## 2021-01-21 NOTE — PROGRESS NOTES
CC: Left knee pain, low back pain, right hip pain.      HPI:   Malou presents today with the following.  Patient of Dr. Hamilton.    1. Chronic pain of left knee  Patient has a history of osteoarthritis, status post right knee replacement.  Patient has been noticing increased pain in her left knee which is affecting her walking.  Over the last few weeks she has also noticed increasing pain in her right hip and lower back.  Mostly noted when she is walking.  She is very active.  However she has been sitting longer hours at her computer than usual.    2. Primary osteoarthritis involving multiple joints  Patient was prescribed Celebrex 200 mg tablets daily.  She tried to discontinue this medication to see if she would notice a difference.  Her pain level has increased in her left knee, low back and her right hip.      Patient Active Problem List    Diagnosis Date Noted   • Chronic pain of left knee 01/21/2021   • Fatigue 08/24/2020   • Lump in the groin 08/24/2020   • Mixed hyperlipidemia 09/25/2019   • Venous insufficiency of both lower extremities 05/14/2019   • History of primary hyperparathyroidism 10/16/2018   • Postmenopausal HRT (hormone replacement therapy) 10/16/2018   • Anxiety and depression 01/09/2018   • Primary osteoarthritis involving multiple joints 10/05/2016   • Migraines 07/29/2013   • Hypothyroidism 12/28/2012   • History of shingles 12/28/2012       Current Outpatient Medications   Medication Sig Dispense Refill   • gabapentin (NEURONTIN) 100 MG Cap TAKE 3 CAPSULES BY MOUTH 3 TIMES A  Cap 1   • escitalopram (LEXAPRO) 20 MG tablet TAKE 1 TABLET BY MOUTH EVERY DAY 90 Tab 1   • SYNTHROID 75 MCG Tab TAKE 1 TAB BY MOUTH EVERY MORNING ON AN EMPTY STOMACH. 90 Tab 1   • celecoxib (CELEBREX) 200 MG Cap Take 1 Cap by mouth every day. 90 Cap 3   • cyclobenzaprine (FLEXERIL) 10 MG Tab Take 1 Tab by mouth 3 times a day as needed for Muscle Spasms. 30 Tab 3   • estradiol (ESTRACE) 0.5 MG tablet TAKE 1/2  "TABLET BY MOUTH DAILY 45 Tab 2   • albuterol 108 (90 Base) MCG/ACT Aero Soln inhalation aerosol Inhale 2 Puffs by mouth.     • Omega-3 Fatty Acids (FISH OIL) 1200 MG Cap Take  by mouth.     • fexofenadine (ALLEGRA) 30 MG tablet Take 30 mg by mouth.     • sumatriptan (IMITREX) 50 MG Tab Take 50 mg by mouth Once PRN.     • Calcium Carb-Cholecalciferol (CALCIUM 1000 + D PO) Take 1 Cap by mouth 1 time daily as needed.     • conjugated estrogen (PREMARIN) 0.625 MG/GM Cream Insert 0.5 g in vagina every 7 days. (Patient not taking: Reported on 1/21/2021) 1 Tube 3     No current facility-administered medications for this visit.          Allergies as of 01/21/2021 - Reviewed 01/21/2021   Allergen Reaction Noted   • Formaldehyde  07/14/2014   • Neomycin  01/09/2018   • Nickel  12/28/2012   • Quaternium-15  12/28/2012   • Sulfate  01/09/2018        ROS: As per HPI.    /80 (BP Location: Right arm, Patient Position: Sitting, BP Cuff Size: Adult)   Pulse 74   Temp 36.1 °C (97 °F) (Temporal)   Resp 18   Ht 1.765 m (5' 9.5\")   Wt 64.1 kg (141 lb 6.4 oz)   SpO2 97%   BMI 20.58 kg/m²     Physical Exam:  Gen:         Alert and oriented, No apparent distress.  Neck:        No Lymphadenopathy or Bruits.  Lungs:     Clear to auscultation bilaterally  CV:          Regular rate and rhythm. No murmurs, rubs or gallops.  Abd:         Soft non tender, non distended.  Ext:          Left lower extremity scarring from treatment for varicose veins.  Left knee stable, no joint effusion, mild crepitus noted.  Musculoskeletal:   Patient with appropriate gait.  Adequate range of motion of lumbosacral spine.  No tenderness noted on paravertebral muscle group, mild tenderness to right SI joint.    Assessment and Plan.   70 y.o. female with the following issues.    1. Chronic pain of left knee  Pain with associated low back and right hip pain.  Discussed proper ergonomics while sitting at the computer    - DX-KNEE COMPLETE 4+ LEFT; " Future  - REFERRAL TO PHYSICAL THERAPY    2. Primary osteoarthritis involving multiple joints  Recommend to restart Celebrex 200 mg daily.  Recommend supplements to include fish oil, turmeric and vitamin D.    RTC 6 weeks.        Please note that this dictation was created using voice recognition software. I have made every reasonable attempt to correct obvious errors, but expect that there are errors of grammar and possible content that I did not discover before finalizing note.

## 2021-01-26 ENCOUNTER — PATIENT MESSAGE (OUTPATIENT)
Dept: MEDICAL GROUP | Facility: PHYSICIAN GROUP | Age: 71
End: 2021-01-26

## 2021-02-01 ENCOUNTER — TELEPHONE (OUTPATIENT)
Dept: MEDICAL GROUP | Facility: PHYSICIAN GROUP | Age: 71
End: 2021-02-01

## 2021-02-03 ENCOUNTER — APPOINTMENT (RX ONLY)
Dept: URBAN - METROPOLITAN AREA CLINIC 4 | Facility: CLINIC | Age: 71
Setting detail: DERMATOLOGY
End: 2021-02-03

## 2021-02-03 ENCOUNTER — IMMUNIZATION (OUTPATIENT)
Dept: FAMILY PLANNING/WOMEN'S HEALTH CLINIC | Facility: IMMUNIZATION CENTER | Age: 71
End: 2021-02-03
Attending: INTERNAL MEDICINE
Payer: MEDICARE

## 2021-02-03 DIAGNOSIS — L81.4 OTHER MELANIN HYPERPIGMENTATION: ICD-10-CM

## 2021-02-03 DIAGNOSIS — Z71.89 OTHER SPECIFIED COUNSELING: ICD-10-CM

## 2021-02-03 DIAGNOSIS — L82.1 OTHER SEBORRHEIC KERATOSIS: ICD-10-CM

## 2021-02-03 DIAGNOSIS — L82.0 INFLAMED SEBORRHEIC KERATOSIS: ICD-10-CM

## 2021-02-03 DIAGNOSIS — Z23 ENCOUNTER FOR VACCINATION: Primary | ICD-10-CM

## 2021-02-03 DIAGNOSIS — L73.8 OTHER SPECIFIED FOLLICULAR DISORDERS: ICD-10-CM

## 2021-02-03 DIAGNOSIS — D18.0 HEMANGIOMA: ICD-10-CM

## 2021-02-03 DIAGNOSIS — Z23 NEED FOR VACCINATION: ICD-10-CM

## 2021-02-03 DIAGNOSIS — D22 MELANOCYTIC NEVI: ICD-10-CM

## 2021-02-03 PROBLEM — D18.01 HEMANGIOMA OF SKIN AND SUBCUTANEOUS TISSUE: Status: ACTIVE | Noted: 2021-02-03

## 2021-02-03 PROBLEM — D22.9 MELANOCYTIC NEVI, UNSPECIFIED: Status: ACTIVE | Noted: 2021-02-03

## 2021-02-03 PROCEDURE — 0001A PFIZER SARS-COV-2 VACCINE: CPT

## 2021-02-03 PROCEDURE — 99213 OFFICE O/P EST LOW 20 MIN: CPT

## 2021-02-03 PROCEDURE — ? LIQUID NITROGEN (COSMETIC)

## 2021-02-03 PROCEDURE — ? SUNSCREEN RECOMMENDATIONS

## 2021-02-03 PROCEDURE — 91300 PFIZER SARS-COV-2 VACCINE: CPT

## 2021-02-03 PROCEDURE — ? COUNSELING

## 2021-02-03 ASSESSMENT — LOCATION ZONE DERM
LOCATION ZONE: NOSE
LOCATION ZONE: FACE

## 2021-02-03 ASSESSMENT — LOCATION DETAILED DESCRIPTION DERM
LOCATION DETAILED: RIGHT MEDIAL MALAR CHEEK
LOCATION DETAILED: NASAL DORSUM

## 2021-02-03 ASSESSMENT — LOCATION SIMPLE DESCRIPTION DERM
LOCATION SIMPLE: NOSE
LOCATION SIMPLE: RIGHT CHEEK

## 2021-02-03 NOTE — PROCEDURE: MIPS QUALITY
Detail Level: Detailed
Quality 130: Documentation Of Current Medications In The Medical Record: Current Medications Documented
Quality 226: Preventive Care And Screening: Tobacco Use: Screening And Cessation Intervention: Patient screened for tobacco use and is an ex/non-smoker
Quality 111:Pneumonia Vaccination Status For Older Adults: Pneumococcal Vaccination Previously Received
Quality 431: Preventive Care And Screening: Unhealthy Alcohol Use - Screening: Patient screened for unhealthy alcohol use using a single question and scores less than 2 times per year

## 2021-02-03 NOTE — HPI: EVALUATION OF SKIN LESION(S)
What Type Of Note Output Would You Prefer (Optional)?: Standard Output
How Severe Are Your Spot(S)?: moderate
Have Your Spot(S) Been Treated In The Past?: has not been treated
Hpi Title: Evaluation of a Skin Lesion
Additional History: FBE.

## 2021-02-09 ENCOUNTER — PHYSICAL THERAPY (OUTPATIENT)
Dept: PHYSICAL THERAPY | Facility: REHABILITATION | Age: 71
End: 2021-02-09
Attending: INTERNAL MEDICINE
Payer: MEDICARE

## 2021-02-09 DIAGNOSIS — G89.29 CHRONIC LOW BACK PAIN WITH SCIATICA, SCIATICA LATERALITY UNSPECIFIED, UNSPECIFIED BACK PAIN LATERALITY: ICD-10-CM

## 2021-02-09 DIAGNOSIS — M25.562 CHRONIC PAIN OF LEFT KNEE: ICD-10-CM

## 2021-02-09 DIAGNOSIS — M54.40 CHRONIC LOW BACK PAIN WITH SCIATICA, SCIATICA LATERALITY UNSPECIFIED, UNSPECIFIED BACK PAIN LATERALITY: ICD-10-CM

## 2021-02-09 DIAGNOSIS — G89.29 CHRONIC PAIN OF LEFT KNEE: ICD-10-CM

## 2021-02-09 PROCEDURE — 97162 PT EVAL MOD COMPLEX 30 MIN: CPT

## 2021-02-09 PROCEDURE — 97110 THERAPEUTIC EXERCISES: CPT

## 2021-02-09 ASSESSMENT — ENCOUNTER SYMPTOMS
QUALITY: SHARP
QUALITY: ACHING
PAIN SCALE AT HIGHEST: 7
PAIN SCALE AT LOWEST: 0
PAIN SCALE: 3

## 2021-02-09 NOTE — OP THERAPY EVALUATION
Outpatient Physical Therapy  INITIAL EVALUATION    [facility] [place] [id number] [facility], [address]  [place] [address]  Phone:  [phone]    Date of Evaluation: 2021    Patient: Malou Pretty  YOB: 1950  MRN: [id number]     Referring Provider: Dottie Jackson M.D.  [address] [place] [address] [address]  [place] [address]   Referring Diagnosis Chronic pain of left knee [M25.562, G89.29]     Time Calculation    Start time: 1405  Stop time: 1505 Time Calculation (min): 60 minutes         Chief Complaint: No chief complaint on file.    Visit Diagnoses     ICD-10-CM   1. Chronic pain of left knee  M25.562    G89.29   2. Chronic low back pain with sciatica, sciatica laterality unspecified, unspecified back pain laterality  M54.40    G89.29         Subjective:   History of Present Illness:     Mechanism of injury:  Chronic low back pain 5-6 years ago. Also thoracic and cervical pain Recently aggravated low back digging/pulling out a plant. Favoring left knee secondary to OA, S/P right TKA at least 7 years ago.  Increased pain right knee secondary to compensating for left knee.  Pain wakes her at night-positional,   Pain:  Bilateral lumbar region glute and inner thigh prolonged walking aggravates  Seated in supported chair relieves.  Luke chi relieves     PMH:  4 years ago:  Bowel impaction secondary to hysterectomy, right TKA, bilateral right > left UE NT into fingers.  OA multiple joints in hand   Taking Celebrex  Prior level of function:  Walks 60 minutes a day,   Sleep disturbance:  Interrupted sleep  Pain:     Current pain rating:  3    At best pain ratin    At worst pain ratin    Location:  Central and bilateral lumbar bilateral post thighs     Quality:  Aching and sharp    Pain Comments::  Topical CBD  Social Support:     Lives with:  Spouse  Treatments:     None    Activities of Daily Living:     Patient reported ADL status: Walks 14 steps up and down for exercise at  home  Patient Goals:     Patient goals for therapy:  Increased strength, increased motion, improved balance and decreased pain      Past Medical History:   Diagnosis Date   • Anxiety    • Cataract    • Chronic pain of left knee 1/21/2021   • Hyperlipidemia    • Migraine    • Parathyroid disorder (HCC)    • Thyroid disease      Past Surgical History:   Procedure Laterality Date   • ABDOMINAL HYSTERECTOMY TOTAL     • EYE SURGERY     • HYSTERECTOMY, VAGINAL     • KNEE REPLACEMENT, TOTAL     • LYSIS ADHESIONS GENERAL      vertical low abdominal   • PARATHYROIDECTOMY       Social History     Tobacco Use   • Smoking status: Never Smoker   • Smokeless tobacco: Never Used   Substance Use Topics   • Alcohol use: Yes     Alcohol/week: 0.6 oz     Types: 1 Glasses of wine per week     Frequency: 2-4 times a month     Drinks per session: 1 or 2     Binge frequency: Never     Comment: once a week     Family and Occupational History     Socioeconomic History   • Marital status:      Spouse name: Not on file   • Number of children: Not on file   • Years of education: Not on file   • Highest education level: Not on file   Occupational History   • Not on file       Objective     Static Posture     Knee   Genu valgus.     Postural Observations  Seated posture: good  Standing posture: good  Correction of posture: has no consistent effect        Hip Screen   Hip range of motion within functional limits.  Hip strength within functional limits with the following exceptions: Resisted hip flexion //increased left lateral hip pain   Hip joint mobility within functional limits    Active Range of Motion     Additional Active Range of Motion Details  FF: ft to toes //no effect  Backward BEND:  25% limited//no effect  SG R: Wnl//no effect  SG L: WNL//no effect  Prone Lying: no effect  EIL: decrease  SEIL  REIL decrease//better    Tests     Left Hip   SLR: Negative.     Right Hip   SLR: Negative.         Therapeutic Exercises (CPT 67452):      1. REIL, x 10    2. Rachael, x 10 every 15 min with walking      Time-based treatments/modalities:    Physical Therapy Timed Treatment Charges  Therapeutic exercise minutes (CPT 84160): 8 minutes      Assessment, Response and Plan:   Impairments: activity intolerance, impaired functional mobility and pain with function    Assessment details:  Patient presents with concurrent chronic low back pain which radiates to inner thigh left> right and left knee pain which limits walking tolerance to < 60 minutes secondary to increasing sharp/intense lumbar pain as time progresses.  Patient's symptoms decrease with REIL and RACHAEL.  Additionally   Patient favors left LE secondary to knee pain which is causing increased right knee pain.  Patient has an excellent rehab prognosis.  Recommend continued PT to meet goals stated below.   Barriers to therapy:  None  Prognosis: good    Goals:   Short Term Goals:   Patient able to walk > or = 60 min with >50% decreased symptoms  Patient able to perform yard work with > 50% decreased symptoms  Short term goal time span:  2-4 weeks      Long Term Goals:    Patient able to walk > 60 min on varied terrain with >50% decreased symptoms  Patient able to perform ADLS with > 75% decreased symptoms  Long term goal time span:  6-8 weeks    Plan:   Therapy options:  Physical therapy treatment to continue  Planned therapy interventions:  Manual Therapy (CPT 16781), E Stim Unattended (CPT 14715), Neuromuscular Re-education (CPT 83591), Therapeutic Exercise (CPT 12925) and Mechanical Traction (CPT 17829)  Frequency:  2x week  Duration in weeks:  8  Discussed with:  Patient  Plan details:  1-2 x week x 8 weeks      Functional Assessment Used  WOMAC Grand Total: 36.46     Referring provider co-signature:  I have reviewed this plan of care and my co-signature certifies the need for services.    Certification Period: 02/09/2021 to  04/06/21    Physician Signature: ________________________________ Date:  ______________

## 2021-02-11 ENCOUNTER — HOSPITAL ENCOUNTER (OUTPATIENT)
Dept: RADIOLOGY | Facility: MEDICAL CENTER | Age: 71
End: 2021-02-11
Attending: INTERNAL MEDICINE
Payer: MEDICARE

## 2021-02-11 DIAGNOSIS — M25.562 CHRONIC PAIN OF LEFT KNEE: ICD-10-CM

## 2021-02-11 DIAGNOSIS — G89.29 CHRONIC PAIN OF LEFT KNEE: ICD-10-CM

## 2021-02-11 PROCEDURE — 73562 X-RAY EXAM OF KNEE 3: CPT | Mod: LT

## 2021-02-16 ENCOUNTER — PHYSICAL THERAPY (OUTPATIENT)
Dept: PHYSICAL THERAPY | Facility: REHABILITATION | Age: 71
End: 2021-02-16
Attending: INTERNAL MEDICINE
Payer: MEDICARE

## 2021-02-16 DIAGNOSIS — G89.29 CHRONIC PAIN OF LEFT KNEE: ICD-10-CM

## 2021-02-16 DIAGNOSIS — G89.29 CHRONIC LOW BACK PAIN WITH SCIATICA, SCIATICA LATERALITY UNSPECIFIED, UNSPECIFIED BACK PAIN LATERALITY: ICD-10-CM

## 2021-02-16 DIAGNOSIS — M54.40 CHRONIC LOW BACK PAIN WITH SCIATICA, SCIATICA LATERALITY UNSPECIFIED, UNSPECIFIED BACK PAIN LATERALITY: ICD-10-CM

## 2021-02-16 DIAGNOSIS — M25.562 CHRONIC PAIN OF LEFT KNEE: ICD-10-CM

## 2021-02-16 PROCEDURE — 97112 NEUROMUSCULAR REEDUCATION: CPT

## 2021-02-16 PROCEDURE — 97140 MANUAL THERAPY 1/> REGIONS: CPT

## 2021-02-16 PROCEDURE — 97110 THERAPEUTIC EXERCISES: CPT

## 2021-02-16 NOTE — OP THERAPY DAILY TREATMENT
Outpatient Physical Therapy  DAILY TREATMENT     [facility] [address] [facility], [address]  [place] [address]  Phone:  [phone]    Date: 02/16/2021    Patient: Malou Pretty  YOB: 1950  MRN: [id number]     Time Calculation    Start time: 1100  Stop time: 1155 Time Calculation (min): 55 minutes         Chief Complaint: No chief complaint on file.    Visit #: 2    SUBJECTIVE:  30-40 % better      OBJECTIVE: hypertonic left QL, atrophy left quad vs right, crepitus left knee          Therapeutic Exercises (CPT 70484):     1. REIL, x 10    2. Rachael, x 10 every 15 min with walking    3. Ball wall squat with isometric hip er, x 20    4. Ball bridge, 10 x 10 sec with 2 x 30 sec    5. Hamstring roll on ball, x 20    6. Shuttle press 4 cords Dl/2 cords SL , x 20 each    7. Sit to stand, x 10 with neutral spine      Therapeutic Exercise Summary:  Access Code: [email or web address]   URL: [email or web address]   Date: 02/16/2021   Prepared by: Jamee Beauchamp      Exercises Bridge with Heels on Swiss Ball - 10 reps - 3 sets - 1x daily - 7x weekly   Supine Hip and Knee Flexion AROM with Swiss Ball - 10 reps - 3 sets - 1x daily - 7x weekly   Prone Quad Stretch with Towel Roll and Strap - 10 reps - 3 sets - 1x daily - 7x weekly   Wall Squat with Swiss Ball and Resistance Loop - 10 reps - 3 sets - 1x daily - 7x weekly       Therapeutic Treatments and Modalities:     1. Neuromuscular Re-education (CPT 42776), slouch overcorrect//finding neutral spine, hip hinge with sit to stand,     Time-based treatments/modalities:    Physical Therapy Timed Treatment Charges  Manual therapy minutes (CPT 49822): 12 minutes  Neuromusc re-ed, balance, coor, post minutes (CPT 93673): 10 minutes  Therapeutic exercise minutes (CPT 37113): 32 minutes  ASSESSMENT:   Response to treatment: decreased neuromuscular control left hip/knee with squat with excessive adduction and internal rotation but patient able to correct  with cueing    PLAN/RECOMMENDATIONS:   Plan for treatment: therapy treatment to continue next visit.  Planned interventions for next visit: continue with current treatment.

## 2021-02-23 ENCOUNTER — PHYSICAL THERAPY (OUTPATIENT)
Dept: PHYSICAL THERAPY | Facility: REHABILITATION | Age: 71
End: 2021-02-23
Attending: INTERNAL MEDICINE
Payer: MEDICARE

## 2021-02-23 DIAGNOSIS — G89.29 CHRONIC PAIN OF LEFT KNEE: ICD-10-CM

## 2021-02-23 DIAGNOSIS — M25.562 CHRONIC PAIN OF LEFT KNEE: ICD-10-CM

## 2021-02-23 DIAGNOSIS — M54.40 CHRONIC LOW BACK PAIN WITH SCIATICA, SCIATICA LATERALITY UNSPECIFIED, UNSPECIFIED BACK PAIN LATERALITY: ICD-10-CM

## 2021-02-23 DIAGNOSIS — G89.29 CHRONIC LOW BACK PAIN WITH SCIATICA, SCIATICA LATERALITY UNSPECIFIED, UNSPECIFIED BACK PAIN LATERALITY: ICD-10-CM

## 2021-02-23 PROCEDURE — 97140 MANUAL THERAPY 1/> REGIONS: CPT

## 2021-02-23 PROCEDURE — 97110 THERAPEUTIC EXERCISES: CPT

## 2021-02-23 NOTE — OP THERAPY DAILY TREATMENT
Outpatient Physical Therapy  DAILY TREATMENT     [facility] [place] [address] [facility], [address]  [place] [address]  Phone:  [phone]    Date: 02/23/2021    Patient: Malou Pretty  YOB: 1950  MRN: [id number]     Time Calculation    Start time: 1400  Stop time: 1435 Time Calculation (min): 35 minutes         Chief Complaint: No chief complaint on file.    Visit #: 3    SUBJECTIVE:  Had difficulty with exercise technique using ball, had to drive a lot this weekend which aggravated symptoms    OBJECTIVE:  Current objective measures: tenderness bilateral paraspinals          Therapeutic Exercises (CPT 87582):     1. REIL, x 10    2. Rachael, x 10 every 15 min with walking    3. Ball wall squat with isometric hip er, x 20    4. Ball bridge, 10 x 10 sec with 2 x 30 sec    5. Hamstring roll on ball, x 20    6. Shuttle press 4 cords Dl/2 cords SL , x 20 each    7. Sit to stand, x 10 with neutral spine      Therapeutic Exercise Summary:  Access Code: [email or web address]   URL: [email or web address]   Date: 02/16/2021   Prepared by: Jamee Beauchamp      Exercises Bridge with Heels on Swiss Ball - 10 reps - 3 sets - 1x daily - 7x weekly   Supine Hip and Knee Flexion AROM with Swiss Ball - 10 reps - 3 sets - 1x daily - 7x weekly   Prone Quad Stretch with Towel Roll and Strap - 10 reps - 3 sets - 1x daily - 7x weekly   Wall Squat with Swiss Ball and Resistance Loop - 10 reps - 3 sets - 1x daily - 7x weekly       Therapeutic Treatments and Modalities:     1. Neuromuscular Re-education (CPT 59385), slouch overcorrect//finding neutral spine, hip hinge with sit to stand,     Time-based treatments/modalities:    Physical Therapy Timed Treatment Charges  Manual therapy minutes (CPT 03092): 8 minutes  Neuromusc re-ed, balance, coor, post minutes (CPT 47518): 5 minutes  Therapeutic exercise minutes (CPT 41792): 20 minutes    ASSESSMENT:   Response to treatment: patient much more confident with there ex for  home  post treatment and was asymptomatic.     PLAN/RECOMMENDATIONS:   Plan for treatment: therapy treatment to continue next visit.  Planned interventions for next visit: continue with current treatment.

## 2021-02-24 ENCOUNTER — IMMUNIZATION (OUTPATIENT)
Dept: FAMILY PLANNING/WOMEN'S HEALTH CLINIC | Facility: IMMUNIZATION CENTER | Age: 71
End: 2021-02-24
Attending: INTERNAL MEDICINE
Payer: MEDICARE

## 2021-02-24 DIAGNOSIS — Z23 ENCOUNTER FOR VACCINATION: Primary | ICD-10-CM

## 2021-02-24 PROCEDURE — 91300 PFIZER SARS-COV-2 VACCINE: CPT

## 2021-02-24 PROCEDURE — 0002A PFIZER SARS-COV-2 VACCINE: CPT

## 2021-03-02 ENCOUNTER — PHYSICAL THERAPY (OUTPATIENT)
Dept: PHYSICAL THERAPY | Facility: REHABILITATION | Age: 71
End: 2021-03-02
Attending: INTERNAL MEDICINE
Payer: MEDICARE

## 2021-03-02 DIAGNOSIS — M25.562 CHRONIC PAIN OF LEFT KNEE: ICD-10-CM

## 2021-03-02 DIAGNOSIS — G89.29 CHRONIC LOW BACK PAIN WITH SCIATICA, SCIATICA LATERALITY UNSPECIFIED, UNSPECIFIED BACK PAIN LATERALITY: ICD-10-CM

## 2021-03-02 DIAGNOSIS — M54.40 CHRONIC LOW BACK PAIN WITH SCIATICA, SCIATICA LATERALITY UNSPECIFIED, UNSPECIFIED BACK PAIN LATERALITY: ICD-10-CM

## 2021-03-02 DIAGNOSIS — G89.29 CHRONIC PAIN OF LEFT KNEE: ICD-10-CM

## 2021-03-02 PROCEDURE — 97110 THERAPEUTIC EXERCISES: CPT

## 2021-03-02 PROCEDURE — 97140 MANUAL THERAPY 1/> REGIONS: CPT

## 2021-03-02 PROCEDURE — 97014 ELECTRIC STIMULATION THERAPY: CPT

## 2021-03-02 NOTE — OP THERAPY DAILY TREATMENT
Outpatient Physical Therapy  DAILY TREATMENT     [facility] [address] [facility], [address]  [place] [address]  Phone:  [phone]    Date: 03/02/2021    Patient: Malou Pretty  YOB: 1950  MRN: [id number]     Time Calculation    Start time: 1030  Stop time: 1120 Time Calculation (min): 50 minutes         Chief Complaint: No chief complaint on file.    Visit #: 4    SUBJECTIVE:  Increased neck pain, thoracic pain from manual/physical labor over the weekend including more driving than usual    OBJECTIVE:  Current objective measures:           Therapeutic Exercises (CPT 23599):     1. REIL, x 10    2. Rachael, x 10 every 15 min with walking    3. Ball wall squat with isometric hip er, 8, D/c secondary to increased knee pain    4. Ball bridge, 10 x 10 sec with 2 x 30 sec    6. Shuttle press 4 cords Dl/2 cords SL , x 20 each    7. Sit to stand, x 10 with neutral spine    8. Repeated thoracic extension with chairback overpressure, 3 x 6    9. Cervical retraction seated, 3 x 5      Therapeutic Exercise Summary:  Access Code: [email or web address]   URL: [email or web address]   Date: 02/16/2021   Prepared by: Jamee Beauchamp      Exercises Bridge with Heels on Swiss Ball - 10 reps - 3 sets - 1x daily - 7x weekly   Supine Hip and Knee Flexion AROM with Swiss Ball - 10 reps - 3 sets - 1x daily - 7x weekly   Prone Quad Stretch with Towel Roll and Strap - 10 reps - 3 sets - 1x daily - 7x weekly   Wall Squat with Swiss Ball and Resistance Loop - 10 reps - 3 sets - 1x daily - 7x weekly       Therapeutic Treatments and Modalities:     1. Neuromuscular Re-education (CPT 70992), 0 min    2. E Stim Unattended (CPT 06643),  IFC with MHP thoracic paraspinals supine x 15 min    Time-based treatments/modalities:    Physical Therapy Timed Treatment Charges  Manual therapy minutes (CPT 50229): 10 minutes  Therapeutic exercise minutes (CPT 67521): 20 minutes    ASSESSMENT:   Response to treatment: Patient  reporting decreased cervical and thoracic stiffness and no lumbar pain post treatment.      PLAN/RECOMMENDATIONS:   Plan for treatment: therapy treatment to continue next visit.  Planned interventions for next visit: E-stim unattended (CPT 61535), manual therapy (CPT 33534), neuromuscular re-education (CPT 76365) and therapeutic exercise (CPT 28273).

## 2021-03-04 ENCOUNTER — OFFICE VISIT (OUTPATIENT)
Dept: MEDICAL GROUP | Facility: PHYSICIAN GROUP | Age: 71
End: 2021-03-04
Payer: MEDICARE

## 2021-03-04 VITALS
HEART RATE: 70 BPM | OXYGEN SATURATION: 98 % | RESPIRATION RATE: 18 BRPM | DIASTOLIC BLOOD PRESSURE: 78 MMHG | BODY MASS INDEX: 20.19 KG/M2 | HEIGHT: 70 IN | SYSTOLIC BLOOD PRESSURE: 118 MMHG | TEMPERATURE: 98.4 F | WEIGHT: 141 LBS

## 2021-03-04 DIAGNOSIS — G89.29 CHRONIC PAIN OF LEFT KNEE: ICD-10-CM

## 2021-03-04 DIAGNOSIS — M54.2 NECK PAIN: ICD-10-CM

## 2021-03-04 DIAGNOSIS — M15.9 PRIMARY OSTEOARTHRITIS INVOLVING MULTIPLE JOINTS: ICD-10-CM

## 2021-03-04 DIAGNOSIS — M25.562 CHRONIC PAIN OF LEFT KNEE: ICD-10-CM

## 2021-03-04 PROCEDURE — 99213 OFFICE O/P EST LOW 20 MIN: CPT | Performed by: INTERNAL MEDICINE

## 2021-03-04 RX ORDER — CYCLOBENZAPRINE HCL 10 MG
10 TABLET ORAL 3 TIMES DAILY PRN
Qty: 30 TABLET | Refills: 3 | Status: SHIPPED | OUTPATIENT
Start: 2021-03-04 | End: 2021-04-22

## 2021-03-04 ASSESSMENT — FIBROSIS 4 INDEX: FIB4 SCORE: 1.89

## 2021-03-04 NOTE — PROGRESS NOTES
Subjective:     CC: 6-week follow-up appointment for left knee pain    HPI:   Malou presents today with     1. Primary osteoarthritis involving multiple joints  Patient restarted Celebrex to help with pain including left knee, hips, neck.     2. Chronic pain of left knee  ***    3. Neck pain  ***      Past Medical History:   Diagnosis Date   • Anxiety    • Cataract    • Chronic pain of left knee 1/21/2021   • Hyperlipidemia    • Migraine    • Neck pain 3/4/2021   • Parathyroid disorder (HCC)    • Thyroid disease        Social History     Tobacco Use   • Smoking status: Never Smoker   • Smokeless tobacco: Never Used   Substance Use Topics   • Alcohol use: Yes     Alcohol/week: 0.6 oz     Types: 1 Glasses of wine per week     Comment: once a week   • Drug use: No       Current Outpatient Medications Ordered in Epic   Medication Sig Dispense Refill   • cyclobenzaprine (FLEXERIL) 10 mg Tab Take 1 tablet by mouth 3 times a day as needed for Muscle Spasms. 30 tablet 3   • gabapentin (NEURONTIN) 100 MG Cap TAKE 3 CAPSULES BY MOUTH 3 TIMES A  Cap 1   • escitalopram (LEXAPRO) 20 MG tablet TAKE 1 TABLET BY MOUTH EVERY DAY 90 Tab 1   • SYNTHROID 75 MCG Tab TAKE 1 TAB BY MOUTH EVERY MORNING ON AN EMPTY STOMACH. 90 Tab 1   • celecoxib (CELEBREX) 200 MG Cap Take 1 Cap by mouth every day. 90 Cap 3   • estradiol (ESTRACE) 0.5 MG tablet TAKE 1/2 TABLET BY MOUTH DAILY 45 Tab 2   • albuterol 108 (90 Base) MCG/ACT Aero Soln inhalation aerosol Inhale 2 Puffs by mouth.     • Omega-3 Fatty Acids (FISH OIL) 1200 MG Cap Take  by mouth.     • fexofenadine (ALLEGRA) 30 MG tablet Take 30 mg by mouth.     • conjugated estrogen (PREMARIN) 0.625 MG/GM Cream Insert 0.5 g in vagina every 7 days. (Patient not taking: Reported on 1/21/2021) 1 Tube 3   • sumatriptan (IMITREX) 50 MG Tab Take 50 mg by mouth Once PRN.     • Calcium Carb-Cholecalciferol (CALCIUM 1000 + D PO) Take 1 Cap by mouth 1 time daily as needed.       No current Epic-ordered  "facility-administered medications on file.       Allergies:  Formaldehyde, Neomycin, Nickel, Quaternium-15, and Sulfate    Review of Systems      Objective:       Exam:  /78 (BP Location: Left arm, Patient Position: Sitting, BP Cuff Size: Adult)   Pulse 70   Temp 36.9 °C (98.4 °F) (Temporal)   Resp 18   Ht 1.765 m (5' 9.5\")   Wt 64 kg (141 lb)   SpO2 98%   BMI 20.52 kg/m²  Body mass index is 20.52 kg/m².    Physical Exam       Labs: ***    Assessment & Plan:     70 y.o. female with the following -     1. Primary osteoarthritis involving multiple joints  - cyclobenzaprine (FLEXERIL) 10 mg Tab; Take 1 tablet by mouth 3 times a day as needed for Muscle Spasms.  Dispense: 30 tablet; Refill: 3  - REFERRAL TO ORTHOPEDICS    2. Chronic pain of left knee  - REFERRAL TO ORTHOPEDICS    3. Neck pain      No follow-ups on file.    Please note that this dictation was created using voice recognition software. I have made every reasonable attempt to correct obvious errors, but I expect that there are errors of grammar and possibly content that I did not discover before finalizing the note.      "

## 2021-03-04 NOTE — PROGRESS NOTES
CC: 6-week follow-up visit for knee pain.    HPI:  Malou presents with the following    1. Primary osteoarthritis involving multiple joints  Patient restarted Celebrex to help with pain including left knee, hips, neck.  Patient also started taking over-the-counter turmeric supplements.    2. Chronic pain of left knee  Patient has been going to physical therapy once a week for the last 1 month to help with her left knee pain and to help with balance.  Her pain is still moderate and now noticed instability with her left knee.  She completed x-rays of her knee in February.  Results showed joint surface irregularity of the medial compartment of left knee.  Could indicate erosive arthropathy.  Osteoarthritis most prominent in the lateral compartment.  Patient is not improving as expected.    3. Neck pain  Patient requesting a refill on her Flexeril    Patient Active Problem List    Diagnosis Date Noted   • Neck pain 03/04/2021   • Chronic pain of left knee 01/21/2021   • Fatigue 08/24/2020   • Lump in the groin 08/24/2020   • Mixed hyperlipidemia 09/25/2019   • Venous insufficiency of both lower extremities 05/14/2019   • History of primary hyperparathyroidism 10/16/2018   • Postmenopausal HRT (hormone replacement therapy) 10/16/2018   • Anxiety and depression 01/09/2018   • Primary osteoarthritis involving multiple joints 10/05/2016   • Migraines 07/29/2013   • Hypothyroidism 12/28/2012   • History of shingles 12/28/2012       Current Outpatient Medications   Medication Sig Dispense Refill   • cyclobenzaprine (FLEXERIL) 10 mg Tab Take 1 tablet by mouth 3 times a day as needed for Muscle Spasms. 30 tablet 3   • gabapentin (NEURONTIN) 100 MG Cap TAKE 3 CAPSULES BY MOUTH 3 TIMES A  Cap 1   • escitalopram (LEXAPRO) 20 MG tablet TAKE 1 TABLET BY MOUTH EVERY DAY 90 Tab 1   • SYNTHROID 75 MCG Tab TAKE 1 TAB BY MOUTH EVERY MORNING ON AN EMPTY STOMACH. 90 Tab 1   • celecoxib (CELEBREX) 200 MG Cap Take 1 Cap by mouth  every day. 90 Cap 3   • estradiol (ESTRACE) 0.5 MG tablet TAKE 1/2 TABLET BY MOUTH DAILY 45 Tab 2   • albuterol 108 (90 Base) MCG/ACT Aero Soln inhalation aerosol Inhale 2 Puffs by mouth.     • Omega-3 Fatty Acids (FISH OIL) 1200 MG Cap Take  by mouth.     • fexofenadine (ALLEGRA) 30 MG tablet Take 30 mg by mouth.     • conjugated estrogen (PREMARIN) 0.625 MG/GM Cream Insert 0.5 g in vagina every 7 days. (Patient not taking: Reported on 1/21/2021) 1 Tube 3   • sumatriptan (IMITREX) 50 MG Tab Take 50 mg by mouth Once PRN.     • Calcium Carb-Cholecalciferol (CALCIUM 1000 + D PO) Take 1 Cap by mouth 1 time daily as needed.       No current facility-administered medications for this visit.         Allergies as of 03/04/2021 - Reviewed 03/04/2021   Allergen Reaction Noted   • Formaldehyde  07/14/2014   • Neomycin  01/09/2018   • Nickel  12/28/2012   • Quaternium-15  12/28/2012   • Sulfate  01/09/2018        Social History     Socioeconomic History   • Marital status:      Spouse name: Not on file   • Number of children: Not on file   • Years of education: Not on file   • Highest education level: Not on file   Occupational History   • Not on file   Tobacco Use   • Smoking status: Never Smoker   • Smokeless tobacco: Never Used   Substance and Sexual Activity   • Alcohol use: Yes     Alcohol/week: 0.6 oz     Types: 1 Glasses of wine per week     Comment: once a week   • Drug use: No   • Sexual activity: Not Currently     Partners: Male     Birth control/protection: Post-Menopausal   Other Topics Concern   •  Service Not Asked   • Blood Transfusions Not Asked   • Caffeine Concern Not Asked   • Occupational Exposure Not Asked   • Hobby Hazards Not Asked   • Sleep Concern Not Asked   • Stress Concern Not Asked   • Weight Concern No   • Special Diet Not Asked   • Back Care Not Asked   • Exercise Yes   • Bike Helmet Not Asked   • Seat Belt Not Asked   • Self-Exams Not Asked   Social History Narrative    Malou is  from [facility] and  her high school [employer] guy, and they have been  for 52 years. They have 3 children; Samir (50, [place]), Capri (49, [place]), Lidia (46, [place]). She has 3 grandchildren, 1 in [place] and 2 in [place]. 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 She loves the outdoors and enjoys hiking and walking near [place].      Social Determinants of Health     Financial Resource Strain:    • Difficulty of Paying Living Expenses:    Food Insecurity:    • Worried About Running Out of Food in the Last Year:    • Ran Out of Food in the Last Year:    Transportation Needs:    • Lack of Transportation (Medical):    • Lack of Transportation (Non-Medical):    Physical Activity:    • Days of Exercise per Week:    • Minutes of Exercise per Session:    Stress:    • Feeling of Stress :    Social Connections:    • Frequency of Communication with Friends and Family:    • Frequency of Social Gatherings with Friends and Family:    • Attends Anglican Services:    • Active Member of Clubs or Organizations:    • Attends Club or Organization Meetings:    • Marital Status:    Intimate Partner Violence:    • Fear of Current or Ex-Partner:    • Emotionally Abused:    • Physically Abused:    • Sexually Abused:        Family History   Problem Relation Age of Onset   • Alzheimer's Disease Mother    • Other Father         ALS   • No Known Problems Sister    • No Known Problems Brother    • No Known Problems Maternal Aunt        Past Surgical History:   Procedure Laterality Date   • ABDOMINAL HYSTERECTOMY TOTAL     • EYE SURGERY     • HYSTERECTOMY, VAGINAL    "  • KNEE REPLACEMENT, TOTAL     • LYSIS ADHESIONS GENERAL      vertical low abdominal   • PARATHYROIDECTOMY         ROS: Positive ROS per HPI.    Denies any Headache,Chest pain,  Shortness of breath,  Abdominal pain, Changes of bowel or bladder, Lower ext edema, Fevers, Nights sweats, Weight Changes, Focal weakness or numbness.  All other systems are negative.    /78 (BP Location: Left arm, Patient Position: Sitting, BP Cuff Size: Adult)   Pulse 70   Temp 36.9 °C (98.4 °F) (Temporal)   Resp 18   Ht 1.765 m (5' 9.5\")   Wt 64 kg (141 lb)   SpO2 98%   BMI 20.52 kg/m²      Physical Exam:  Gen:         Alert and oriented, No apparent distress.  No physical exam today.    Assessment and Plan.   70 y.o. female presenting with the following.     1. Primary osteoarthritis involving multiple joints    - cyclobenzaprine (FLEXERIL) 10 mg Tab; Take 1 tablet by mouth 3 times a day as needed for Muscle Spasms.  Dispense: 30 tablet; Refill: 3  - REFERRAL TO ORTHOPEDICS    2. Chronic pain of left knee    - REFERRAL TO ORTHOPEDICS    3. Neck pain  Flexeril as needed.    "

## 2021-03-09 ENCOUNTER — PHYSICAL THERAPY (OUTPATIENT)
Dept: PHYSICAL THERAPY | Facility: REHABILITATION | Age: 71
End: 2021-03-09
Attending: INTERNAL MEDICINE
Payer: MEDICARE

## 2021-03-09 DIAGNOSIS — G89.29 CHRONIC PAIN OF LEFT KNEE: ICD-10-CM

## 2021-03-09 DIAGNOSIS — G89.29 CHRONIC LOW BACK PAIN WITH SCIATICA, SCIATICA LATERALITY UNSPECIFIED, UNSPECIFIED BACK PAIN LATERALITY: ICD-10-CM

## 2021-03-09 DIAGNOSIS — M25.562 CHRONIC PAIN OF LEFT KNEE: ICD-10-CM

## 2021-03-09 DIAGNOSIS — M54.40 CHRONIC LOW BACK PAIN WITH SCIATICA, SCIATICA LATERALITY UNSPECIFIED, UNSPECIFIED BACK PAIN LATERALITY: ICD-10-CM

## 2021-03-09 PROCEDURE — 97140 MANUAL THERAPY 1/> REGIONS: CPT

## 2021-03-09 PROCEDURE — 97110 THERAPEUTIC EXERCISES: CPT

## 2021-03-09 PROCEDURE — 97112 NEUROMUSCULAR REEDUCATION: CPT

## 2021-03-09 NOTE — OP THERAPY DAILY TREATMENT
Outpatient Physical Therapy  DAILY TREATMENT     [facility] [address] [facility], [address]  [place] [address]  Phone:  [phone]    Date: 03/09/2021    Patient: Malou Pretty  YOB: 1950  MRN: [id number]     Time Calculation    Start time: 1006  Stop time: 1100 Time Calculation (min): 54 minutes         Chief Complaint: No chief complaint on file.    Visit #: 5    SUBJECTIVE:  21/2 hours of yard work lumbar and hip pain and bilateral knees and hands.     OBJECTIVE: increased right knee valgus/hip IR with step down            Therapeutic Exercises (CPT 18623):     1. REIL, x 10    2. Rachael, x 10 every 15 min with walking    4. Ball bridge, 10 x 10 sec with 2 x 30 sec    5. Bird dog on ball, x 10 each    6. Shuttle press 4 cords Dl/2 cords SL , x 20 each    7. Sit to stand, x 10 with neutral spine    8. Repeated thoracic extension with chairback overpressure, 3 x 6    9. Cervical retraction seated, 3 x 5    10. Clamsheslls pink tband 90/90, x 10    11. Step up/down , x 10 each with 1 hand support    12. Lateral step up/downs, x 10      Therapeutic Exercise Summary:  Access Code: [email or web address]   URL: [email or web address]   Date: 02/16/2021   Prepared by: Jamee Beauchamp      Exercises Bridge with Heels on Swiss Ball - 10 reps - 3 sets - 1x daily - 7x weekly   Supine Hip and Knee Flexion AROM with Swiss Ball - 10 reps - 3 sets - 1x daily - 7x weekly   Prone Quad Stretch with Towel Roll and Strap - 10 reps - 3 sets - 1x daily - 7x weekly   Wall Squat with Swiss Ball and Resistance Loop - 10 reps - 3 sets - 1x daily - 7x weekly    Access Code: [email or web address]   URL: [email or web address]   Date: 03/09/2021   Prepared by: Jamee Beauchamp      Exercises Bird Dog on Swiss Ball - 10 reps - 3 sets - 1x daily - 7x weekly   Step Up - 10 reps - 3 sets - 1x daily - 7x weekly   Backward Step Up - 10 reps - 3 sets - 1x daily - 7x weekly   Lateral Step Up - 10 reps - 3 sets - 1x daily  "- 7x weekly   Supine Bridge with Resistance Band - 10 reps - 3 sets - 1x daily - 7x weekly         Therapeutic Treatments and Modalities:     1. Manual Therapy (CPT 33624), IASTM lumbar paraspinals, gentle extension mobilization    2. Neuromuscular Re-education (CPT 99037), neutral spine in sitting, quadraped and standing, weightshifts in standing to encourage symetrical squat/functional movement and     Time-based treatments/modalities:    Physical Therapy Timed Treatment Charges  Manual therapy minutes (CPT 00102): 10 minutes  Neuromusc re-ed, balance, coor, post minutes (CPT 65315): 10 minutes  Therapeutic exercise minutes (CPT 35067): 33 minutes    ASSESSMENT: right hip abductor weakness > left.   asymptomatic post treatment, left knee tolerated increased loading without pain but did have palpable crepitus and 2-3 episodes of subjective \"slipping or instability with shuttle press and forward step down    PLAN/RECOMMENDATIONS:   Plan for treatment: therapy treatment to continue next visit.  Planned interventions for next visit: continue with current treatment.       "

## 2021-03-12 ENCOUNTER — TELEPHONE (OUTPATIENT)
Dept: MEDICAL GROUP | Facility: PHYSICIAN GROUP | Age: 71
End: 2021-03-12

## 2021-03-12 ENCOUNTER — HOSPITAL ENCOUNTER (EMERGENCY)
Facility: MEDICAL CENTER | Age: 71
End: 2021-03-12
Attending: EMERGENCY MEDICINE
Payer: MEDICARE

## 2021-03-12 VITALS
WEIGHT: 143.3 LBS | SYSTOLIC BLOOD PRESSURE: 164 MMHG | BODY MASS INDEX: 21.22 KG/M2 | HEART RATE: 82 BPM | OXYGEN SATURATION: 98 % | RESPIRATION RATE: 16 BRPM | TEMPERATURE: 97.5 F | HEIGHT: 69 IN | DIASTOLIC BLOOD PRESSURE: 100 MMHG

## 2021-03-12 DIAGNOSIS — R25.1 SHAKINESS: ICD-10-CM

## 2021-03-12 DIAGNOSIS — R19.7 DIARRHEA, UNSPECIFIED TYPE: ICD-10-CM

## 2021-03-12 LAB
ALBUMIN SERPL BCP-MCNC: 4.3 G/DL (ref 3.2–4.9)
ALBUMIN/GLOB SERPL: 1.6 G/DL
ALP SERPL-CCNC: 21 U/L (ref 30–99)
ALT SERPL-CCNC: 21 U/L (ref 2–50)
ANION GAP SERPL CALC-SCNC: 10 MMOL/L (ref 7–16)
APPEARANCE UR: CLEAR
AST SERPL-CCNC: 25 U/L (ref 12–45)
BASOPHILS # BLD AUTO: 0.6 % (ref 0–1.8)
BASOPHILS # BLD: 0.06 K/UL (ref 0–0.12)
BILIRUB SERPL-MCNC: 0.2 MG/DL (ref 0.1–1.5)
BILIRUB UR QL STRIP.AUTO: NEGATIVE
BUN SERPL-MCNC: 18 MG/DL (ref 8–22)
CALCIUM SERPL-MCNC: 8.3 MG/DL (ref 8.4–10.2)
CHLORIDE SERPL-SCNC: 93 MMOL/L (ref 96–112)
CO2 SERPL-SCNC: 26 MMOL/L (ref 20–33)
COLOR UR: ABNORMAL
CREAT SERPL-MCNC: 0.85 MG/DL (ref 0.5–1.4)
EKG IMPRESSION: NORMAL
EOSINOPHIL # BLD AUTO: 0.07 K/UL (ref 0–0.51)
EOSINOPHIL NFR BLD: 0.8 % (ref 0–6.9)
ERYTHROCYTE [DISTWIDTH] IN BLOOD BY AUTOMATED COUNT: 45.1 FL (ref 35.9–50)
GLOBULIN SER CALC-MCNC: 2.7 G/DL (ref 1.9–3.5)
GLUCOSE SERPL-MCNC: 132 MG/DL (ref 65–99)
GLUCOSE UR STRIP.AUTO-MCNC: NEGATIVE MG/DL
HCT VFR BLD AUTO: 41.4 % (ref 37–47)
HGB BLD-MCNC: 14 G/DL (ref 12–16)
IMM GRANULOCYTES # BLD AUTO: 0.03 K/UL (ref 0–0.11)
IMM GRANULOCYTES NFR BLD AUTO: 0.3 % (ref 0–0.9)
KETONES UR STRIP.AUTO-MCNC: NEGATIVE MG/DL
LEUKOCYTE ESTERASE UR QL STRIP.AUTO: ABNORMAL
LIPASE SERPL-CCNC: 33 U/L (ref 7–58)
LYMPHOCYTES # BLD AUTO: 2.14 K/UL (ref 1–4.8)
LYMPHOCYTES NFR BLD: 23.1 % (ref 22–41)
MCH RBC QN AUTO: 30.6 PG (ref 27–33)
MCHC RBC AUTO-ENTMCNC: 33.8 G/DL (ref 33.6–35)
MCV RBC AUTO: 90.6 FL (ref 81.4–97.8)
MICRO URNS: ABNORMAL
MONOCYTES # BLD AUTO: 0.95 K/UL (ref 0–0.85)
MONOCYTES NFR BLD AUTO: 10.3 % (ref 0–13.4)
MUCOUS THREADS #/AREA URNS HPF: ABNORMAL /HPF
NEUTROPHILS # BLD AUTO: 6 K/UL (ref 2–7.15)
NEUTROPHILS NFR BLD: 64.9 % (ref 44–72)
NITRITE UR QL STRIP.AUTO: NEGATIVE
NRBC # BLD AUTO: 0 K/UL
NRBC BLD-RTO: 0 /100 WBC
PH UR STRIP.AUTO: 5 [PH] (ref 5–8)
PLATELET # BLD AUTO: 176 K/UL (ref 164–446)
PMV BLD AUTO: 9.1 FL (ref 9–12.9)
POTASSIUM SERPL-SCNC: 3.8 MMOL/L (ref 3.6–5.5)
PROT SERPL-MCNC: 7 G/DL (ref 6–8.2)
PROT UR QL STRIP: NEGATIVE MG/DL
RBC # BLD AUTO: 4.57 M/UL (ref 4.2–5.4)
RBC # URNS HPF: ABNORMAL /HPF
RBC UR QL AUTO: ABNORMAL
SODIUM SERPL-SCNC: 129 MMOL/L (ref 135–145)
SP GR UR STRIP.AUTO: <=1.005
TROPONIN T SERPL-MCNC: 9 NG/L (ref 6–19)
WBC # BLD AUTO: 9.3 K/UL (ref 4.8–10.8)
WBC #/AREA URNS HPF: ABNORMAL /HPF

## 2021-03-12 PROCEDURE — 93005 ELECTROCARDIOGRAM TRACING: CPT | Performed by: EMERGENCY MEDICINE

## 2021-03-12 PROCEDURE — 36415 COLL VENOUS BLD VENIPUNCTURE: CPT

## 2021-03-12 PROCEDURE — 81001 URINALYSIS AUTO W/SCOPE: CPT

## 2021-03-12 PROCEDURE — 80053 COMPREHEN METABOLIC PANEL: CPT

## 2021-03-12 PROCEDURE — 84484 ASSAY OF TROPONIN QUANT: CPT

## 2021-03-12 PROCEDURE — 83690 ASSAY OF LIPASE: CPT

## 2021-03-12 PROCEDURE — 85025 COMPLETE CBC W/AUTO DIFF WBC: CPT

## 2021-03-12 PROCEDURE — 99284 EMERGENCY DEPT VISIT MOD MDM: CPT

## 2021-03-12 ASSESSMENT — PAIN DESCRIPTION - DESCRIPTORS: DESCRIPTORS: ACHING

## 2021-03-12 ASSESSMENT — FIBROSIS 4 INDEX: FIB4 SCORE: 1.89

## 2021-03-12 NOTE — TELEPHONE ENCOUNTER
Phone Number Called: [phone] (home) [phone] (work)      Call outcome: Spoke to patient regarding message below.    Message: Patient was very upset about her apt being canceled and not being informed about it. Patient does not understand why it was canceled. Pt said she never called to cancel apt.     Informed pt I apologized and I would help her reschedule while on the phone. Pt said she left a vm as well about having Diarrhea for 2 weeks. Has been seeing  and recommendation have not helped. Offered pt an apt on Monday 03/15/2021 to address the diarrhea but she refused and said it might be better by then. Patient also refused her AWV at this time and will give us a call back.

## 2021-03-12 NOTE — TELEPHONE ENCOUNTER
----- Message from Malou Pretty sent at 3/12/2021  1:18 PM PST -----  Regarding: RE: Non-Urgent Medical Question  Contact: [phone]  Who canceled that appointment!?  And why?  I have a mammogram scheduled for 3.20.21 in anticipation of that timing.  Shall I cancel it too?    ----- Message -----  From: Medical Assistant Lyndsey ESCOTO  Sent: 3/12/21 1:14 PM  To: Malou Pretty  Subject: RE: Non-Urgent Medical Question    Mirta Westfall, your apt was canceled back on 02/09/2021 @9:31 am.   Please call [phone] if you would like to reschedule this apt.   I do not see you have any upcoming apts with .   Thank you Ksenia SLAUGHTER       ----- Message -----       From:Malou Pretty       Sent:3/12/2021 11:40 AM PST         To:Physician Melba Hamilton    Subject:Non-Urgent Medical Question    I note that my wellness Medicare appointment for March 31 is missing from the [facility] appointment page.  Am I overlooking something, or has it been moved?  thank you,  Malou Pretty

## 2021-03-13 NOTE — ED PROVIDER NOTES
ED Provider Note    CHIEF COMPLAINT  Chief Complaint   Patient presents with   • Diarrhea   • Abdominal Pain   • Fatigue       HPI  Malou Pretty is a 70 y.o. female who presents with diarrhea that started about 10 days ago watery type no melena or hematochezia.  That is actually improved.  She is concerned that today she developed some shaky feelings when she was exerting.  She has some weakness.  She could not find her keys and was flustered and walked around quite a bit and then developed spasm type chest pain that lasted 5 to 10 seconds.  She has been having neck pain bothering her a week it worsened when she was doing Pilates today she has no arm pain no shortness of breath no nausea or vomiting the diarrhea has improved all other systems are negative    REVIEW OF SYSTEMS  See HPI for further details    PAST MEDICAL HISTORY  Past Medical History:   Diagnosis Date   • Anxiety    • Cataract    • Chronic pain of left knee 1/21/2021   • Hyperlipidemia    • Migraine    • Neck pain 3/4/2021   • Parathyroid disorder (HCC)    • Thyroid disease        FAMILY HISTORY  Family History   Problem Relation Age of Onset   • Alzheimer's Disease Mother    • Other Father         ALS   • No Known Problems Sister    • No Known Problems Brother    • No Known Problems Maternal Aunt        SOCIAL HISTORY  Social History     Socioeconomic History   • Marital status:      Spouse name: Not on file   • Number of children: Not on file   • Years of education: Not on file   • Highest education level: Not on file   Occupational History   • Not on file   Tobacco Use   • Smoking status: Never Smoker   • Smokeless tobacco: Never Used   Substance and Sexual Activity   • Alcohol use: Yes     Alcohol/week: 0.6 oz     Types: 1 Glasses of wine per week     Comment: Occasionally   • Drug use: No   • Sexual activity: Not Currently     Partners: Male     Birth control/protection: Post-Menopausal   Other Topics Concern   •  Service Not  Asked   • Blood Transfusions Not Asked   • Caffeine Concern Not Asked   • Occupational Exposure Not Asked   • Hobby Hazards Not Asked   • Sleep Concern Not Asked   • Stress Concern Not Asked   • Weight Concern No   • Special Diet Not Asked   • Back Care Not Asked   • Exercise Yes   • Bike Helmet Not Asked   • Seat Belt Not Asked   • Self-Exams Not Asked   Social History Narrative    Malou is from [facility] and  her high school [employer] guy, and they have been  for 52 years. They have 3 children; Samir (50, [place]), Capri (49, [place]), Lidia (46, [place]). She has 3 grandchildren, 1 in [place] and 2 in [place]. 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 She loves the outdoors and enjoys hiking and walking near [place].      Social Determinants of Health     Financial Resource Strain:    • Difficulty of Paying Living Expenses:    Food Insecurity:    • Worried About Running Out of Food in the Last Year:    • Ran Out of Food in the Last Year:    Transportation Needs:    • Lack of Transportation (Medical):    • Lack of Transportation (Non-Medical):    Physical Activity:    • Days of Exercise per Week:    • Minutes of Exercise per Session:    Stress:    • Feeling of Stress :    Social Connections:    • Frequency of Communication with Friends and Family:    • Frequency of Social Gatherings with Friends and Family:    • Attends Bahai Services:    • Active Member of Clubs or Organizations:    • Attends Club or Organization Meetings:    • Marital Status:    Intimate Partner Violence:    • Fear of Current or Ex-Partner:    • Emotionally Abused:    •  "Physically Abused:    • Sexually Abused:        SURGICAL HISTORY  Past Surgical History:   Procedure Laterality Date   • ABDOMINAL HYSTERECTOMY TOTAL     • EYE SURGERY     • HYSTERECTOMY, VAGINAL     • KNEE REPLACEMENT, TOTAL     • LYSIS ADHESIONS GENERAL      vertical low abdominal   • PARATHYROIDECTOMY         CURRENT MEDICATIONS  Home Medications    **Home medications have not yet been reviewed for this encounter**         ALLERGIES  Allergies   Allergen Reactions   • Formaldehyde    • Neomycin      Rash,itching   • Nickel      Nickel sulfate    • Quaternium-15    • Sulfate      \"Janak,Formaldehyde\" Pt states took allergy test       PHYSICAL EXAM  VITAL SIGNS: BP (!) 164/100   Pulse 82   Temp 36.4 °C (97.5 °F) (Temporal)   Resp 16   Ht 1.753 m (5' 9\")   Wt 65 kg (143 lb 4.8 oz)   SpO2 98%   BMI 21.16 kg/m²     Constitutional: Patient is alert and oriented x3 in no distress   HENT: Moist mucous membranes  Eyes:   No conjunctivitis  Neck: Trachea is midline  Cardiovascular: Normal heart rate    Thorax & Lungs: Clear to auscultation  Back: No CVA tenderness  Abdomen: Nontender  Neurologic: Normal motor sensation  Extremities: No edema  Psychiatric: Affect normal, Judgment normal, Mood normal.     Results for orders placed or performed during the hospital encounter of 03/12/21   CBC WITH DIFFERENTIAL   Result Value Ref Range    WBC 9.3 4.8 - 10.8 K/uL    RBC 4.57 4.20 - 5.40 M/uL    Hemoglobin 14.0 12.0 - 16.0 g/dL    Hematocrit 41.4 37.0 - 47.0 %    MCV 90.6 81.4 - 97.8 fL    MCH 30.6 27.0 - 33.0 pg    MCHC 33.8 33.6 - 35.0 g/dL    RDW 45.1 35.9 - 50.0 fL    Platelet Count 176 164 - 446 K/uL    MPV 9.1 9.0 - 12.9 fL    Neutrophils-Polys 64.90 44.00 - 72.00 %    Lymphocytes 23.10 22.00 - 41.00 %    Monocytes 10.30 0.00 - 13.40 %    Eosinophils 0.80 0.00 - 6.90 %    Basophils 0.60 0.00 - 1.80 %    Immature Granulocytes 0.30 0.00 - 0.90 %    Nucleated RBC 0.00 /100 WBC    Neutrophils (Absolute) 6.00 2.00 - 7.15 " K/uL    Lymphs (Absolute) 2.14 1.00 - 4.80 K/uL    Monos (Absolute) 0.95 (H) 0.00 - 0.85 K/uL    Eos (Absolute) 0.07 0.00 - 0.51 K/uL    Baso (Absolute) 0.06 0.00 - 0.12 K/uL    Immature Granulocytes (abs) 0.03 0.00 - 0.11 K/uL    NRBC (Absolute) 0.00 K/uL   COMP METABOLIC PANEL   Result Value Ref Range    Sodium 129 (L) 135 - 145 mmol/L    Potassium 3.8 3.6 - 5.5 mmol/L    Chloride 93 (L) 96 - 112 mmol/L    Co2 26 20 - 33 mmol/L    Anion Gap 10.0 7.0 - 16.0    Glucose 132 (H) 65 - 99 mg/dL    Bun 18 8 - 22 mg/dL    Creatinine 0.85 0.50 - 1.40 mg/dL    Calcium 8.3 (L) 8.4 - 10.2 mg/dL    AST(SGOT) 25 12 - 45 U/L    ALT(SGPT) 21 2 - 50 U/L    Alkaline Phosphatase 21 (L) 30 - 99 U/L    Total Bilirubin 0.2 0.1 - 1.5 mg/dL    Albumin 4.3 3.2 - 4.9 g/dL    Total Protein 7.0 6.0 - 8.2 g/dL    Globulin 2.7 1.9 - 3.5 g/dL    A-G Ratio 1.6 g/dL   LIPASE   Result Value Ref Range    Lipase 33 7 - 58 U/L   URINALYSIS    Specimen: Urine   Result Value Ref Range    Color Straw     Character Clear     Specific Gravity <=1.005 <1.035    Ph 5.0 5.0 - 8.0    Glucose Negative Negative mg/dL    Ketones Negative Negative mg/dL    Protein Negative Negative mg/dL    Bilirubin Negative Negative    Nitrite Negative Negative    Leukocyte Esterase Trace (A) Negative    Occult Blood Moderate (A) Negative    Micro Urine Req Microscopic    URINE MICROSCOPIC (W/UA)   Result Value Ref Range    WBC 0-2 /hpf    RBC 2-5 (A) /hpf    Mucous Threads Rare /hpf   ESTIMATED GFR   Result Value Ref Range    GFR If African American >60 >60 mL/min/1.73 m 2    GFR If Non African American >60 >60 mL/min/1.73 m 2   TROPONIN   Result Value Ref Range    Troponin T 9 6 - 19 ng/L   EKG (NOW)   Result Value Ref Range    Report       [facility]    Test Date:  2021-03-12  Pt Name:    PHONG ROSARIO                 Department: [facility]  MRN:        [id number]                      Room:       -ROOM 5  Gender:     Female                        Technician: HRR  :        [id number]                   Requested By:DAVID NATH  Order #:    [id number]                    Reading MD: DAVID NATH MD    Measurements  Intervals                                Axis  Rate:       68                           P:          -2  MT:         180                          QRS:        -60  QRSD:       92                           T:          51  QT:         440  QTc:        469    Interpretive Statements  Normal sinus rhythm 68 with a normal corrected QT interval QRS left axis  normal  ST segments no old EKG for comparison  Electronically Signed On 3- 18:55:42 PST by DAVID NATH MD            COURSE & MEDICAL DECISION MAKING  Pertinent Labs & Imaging studies reviewed. (See chart for details)  Patient has had improved symptoms.  Due to her having the episode of chest pain I am getting a troponin and also an EKG.  Troponin is normal range and EKG is normal as well.    Patient has atypical presentation with 10 seconds of chest pain.  Her cardiac evaluation is negative her labs show 129 sodium which is minimally decreased.  I did check her previous and this is in the normal range for her.  I am going to have her water restrict and take some Gatorade for supplement.  She is to watch her blood pressure at home she is given return precautions and follow-up    FINAL IMPRESSION  1.   2.   1. Shakiness     2. Diarrhea, unspecified type         3.         Electronically signed by: David Nath M.D., 3/12/2021 6:24 PM

## 2021-03-13 NOTE — ED TRIAGE NOTES
"Presents complaining of thin, watery diarrhea with generalized abdominal pain, and fatigue recurring for the past 2 weeks.   Chief Complaint   Patient presents with   • Diarrhea   • Abdominal Pain   • Fatigue     BP (!) 164/100   Pulse 82   Temp 36.4 °C (97.5 °F) (Temporal)   Resp 16   Ht 1.753 m (5' 9\")   Wt 65 kg (143 lb 4.8 oz)   SpO2 98%   BMI 21.16 kg/m²      "

## 2021-03-13 NOTE — ED NOTES
IV discontinued with cathlon intact    Discharge home with instruction and follow up care reviewed and given to patient with verbal understanding.

## 2021-03-15 ENCOUNTER — OFFICE VISIT (OUTPATIENT)
Dept: MEDICAL GROUP | Facility: PHYSICIAN GROUP | Age: 71
End: 2021-03-15
Payer: MEDICARE

## 2021-03-15 VITALS
TEMPERATURE: 97.5 F | OXYGEN SATURATION: 97 % | HEART RATE: 74 BPM | HEIGHT: 70 IN | BODY MASS INDEX: 20.27 KG/M2 | SYSTOLIC BLOOD PRESSURE: 120 MMHG | DIASTOLIC BLOOD PRESSURE: 60 MMHG | WEIGHT: 141.6 LBS

## 2021-03-15 DIAGNOSIS — R31.21 ASYMPTOMATIC MICROSCOPIC HEMATURIA: ICD-10-CM

## 2021-03-15 DIAGNOSIS — Z91.89 OTHER SPECIFIED PERSONAL RISK FACTORS, NOT ELSEWHERE CLASSIFIED: ICD-10-CM

## 2021-03-15 DIAGNOSIS — E03.9 HYPOTHYROIDISM, UNSPECIFIED TYPE: ICD-10-CM

## 2021-03-15 DIAGNOSIS — Z86.39 HISTORY OF PRIMARY HYPERPARATHYROIDISM: ICD-10-CM

## 2021-03-15 DIAGNOSIS — F41.9 ANXIETY AND DEPRESSION: ICD-10-CM

## 2021-03-15 DIAGNOSIS — F32.A ANXIETY AND DEPRESSION: ICD-10-CM

## 2021-03-15 DIAGNOSIS — E78.2 MIXED HYPERLIPIDEMIA: ICD-10-CM

## 2021-03-15 DIAGNOSIS — G47.00 INSOMNIA, UNSPECIFIED TYPE: ICD-10-CM

## 2021-03-15 DIAGNOSIS — R53.83 FATIGUE, UNSPECIFIED TYPE: ICD-10-CM

## 2021-03-15 PROCEDURE — 99215 OFFICE O/P EST HI 40 MIN: CPT | Performed by: INTERNAL MEDICINE

## 2021-03-15 RX ORDER — LEVOTHYROXINE SODIUM 0.07 MG/1
75 TABLET ORAL
Qty: 90 TABLET | Refills: 3 | Status: SHIPPED | OUTPATIENT
Start: 2021-03-15

## 2021-03-15 ASSESSMENT — FIBROSIS 4 INDEX: FIB4 SCORE: 2.23

## 2021-03-15 ASSESSMENT — PATIENT HEALTH QUESTIONNAIRE - PHQ9
9. THOUGHTS THAT YOU WOULD BE BETTER OFF DEAD, OR OF HURTING YOURSELF: NOT AT ALL
8. MOVING OR SPEAKING SO SLOWLY THAT OTHER PEOPLE COULD HAVE NOTICED. OR THE OPPOSITE, BEING SO FIGETY OR RESTLESS THAT YOU HAVE BEEN MOVING AROUND A LOT MORE THAN USUAL: NOT AT ALL
6. FEELING BAD ABOUT YOURSELF - OR THAT YOU ARE A FAILURE OR HAVE LET YOURSELF OR YOUR FAMILY DOWN: NOT AL ALL
1. LITTLE INTEREST OR PLEASURE IN DOING THINGS: SEVERAL DAYS
SUM OF ALL RESPONSES TO PHQ9 QUESTIONS 1 AND 2: 1
7. TROUBLE CONCENTRATING ON THINGS, SUCH AS READING THE NEWSPAPER OR WATCHING TELEVISION: SEVERAL DAYS
SUM OF ALL RESPONSES TO PHQ QUESTIONS 1-9: 10
3. TROUBLE FALLING OR STAYING ASLEEP OR SLEEPING TOO MUCH: NEARLY EVERY DAY
5. POOR APPETITE OR OVEREATING: MORE THAN HALF THE DAYS
4. FEELING TIRED OR HAVING LITTLE ENERGY: NEARLY EVERY DAY
2. FEELING DOWN, DEPRESSED, IRRITABLE, OR HOPELESS: NOT AT ALL

## 2021-03-15 NOTE — ASSESSMENT & PLAN NOTE
Chronic and ongoing problem.  Her LDL improved on red yeast rice extract however she developed intolerable side effects.  We will keep her off red yeast rice extract and statin medicine at this time.  We will complete coronary calcium score to see if she has clinical ASCVD to better stratify her risk of CAD moving forward.

## 2021-03-15 NOTE — ASSESSMENT & PLAN NOTE
Chronic and ongoing problem.  She feels like once her health situation improves her mood will also improve.  She believes her current dose of Lexapro 10 mg daily is working well for her.  She does not desire to increase at this time.

## 2021-03-15 NOTE — ASSESSMENT & PLAN NOTE
Chronic and decompensated issue.  At this time we will obtain an overnight pulse oximetry to screen for obstructive or central sleep apnea.  I will also refer her to Dr. Karli Poole for cognitive behavioral therapy related to her insomnia.  She would like to avoid medications due to grogginess the following morning which I think is very reasonable.  If her overnight pulse oximetry would be positive then we would talk about scheduling a formal sleep study.

## 2021-03-15 NOTE — PROGRESS NOTES
Subjective:   Chief Complaint/History of Present Illness:  Malou Pretty is a 70 y.o. female established patient who presents today to discuss medical problems as listed below. Malou is unaccompanied for today's visit.    Problem   Asymptomatic Microscopic Hematuria       Ref. Range 3/12/2021 17:39   Color Unknown Straw   Character Unknown Clear   Specific Gravity Latest Ref Range: <1.035  <=1.005   Ph Latest Ref Range: 5.0 - 8.0  5.0   Glucose Latest Ref Range: Negative mg/dL Negative   Ketones Latest Ref Range: Negative mg/dL Negative   Bilirubin Latest Ref Range: Negative  Negative   Occult Blood Latest Ref Range: Negative  Moderate (A)   Protein Latest Ref Range: Negative mg/dL Negative   Nitrite Latest Ref Range: Negative  Negative   Leukocyte Esterase Latest Ref Range: Negative  Trace (A)   Micro Urine Req Unknown Microscopic   WBC Latest Units: /hpf 0-2   RBC Latest Units: /hpf 2-5 (A)   Mucous Threads Latest Units: /hpf Rare     She has moderate occult blood on recent urinalysis in the ED.  She reports history of vaginal atrophy and did not use an alcohol swab prior to giving the sample.  No gross hematuria noted.  She is not taking any antiplatelet medications or blood thinners.     Insomnia    She has insomnia coexisting with her fatigue.  She has tried over-the-counter medicines but they simply lead to worsening fatigue and grogginess the following morning.  She is not interested in taking medicines as they tend to only cause side effects.  She has never undergone CBT for her insomnia before but would be interested.     Fatigue    She reports worsening of chronic fatigue.  She finds that she is sleeping more at night and even having to nap up to an hour each week.  She has history of low thyroid and is taking Synthroid with appropriate stability of her thyroid labs.  We checked a B12 level which was more than sufficient and so actually had her back off on her supplementation.  No prior pulse oximetry.   She uses sleep aids but then feels groggy the next morning admits to using excess caffeine to help wake herself up.  She has a history of low sodium phonically.     Mixed Hyperlipidemia    This is a new problem and she has never been on statin medication in the past.  [facility] decision aid for starting statin medication showed 10 year ASCVD risk of 8%, improved to 5-6% with initiation of high and stand potency statin therapy.     We tried starting atorvastatin 20 mg and she developed side effects and these persisted at 10 mg so she stopped the medicine altogether.  We also tried red yeast rice extract with coenzyme Q 10 but she feels like she had cumulative side effects and ultimately stopped the medicine after a bout of myalgias and GI upset (diarrhea).  No personal history of cardiovascular or cerebrovascular disease.  We did note improvement of her cholesterol values on the red yeast rice extract.  No prior coronary calcium score completed.       Ref. Range 5/15/2019 06:35 5/16/2020 07:35 9/4/2020 07:29   Cholesterol,Tot Latest Ref Range: 100 - 199 mg/dL 253 (H) 252 (H) 254 (H)   Triglycerides Latest Ref Range: 0 - 149 mg/dL 48 51 50   HDL Latest Ref Range: >=40 mg/dL 92 99 112   LDL Latest Ref Range: <100 mg/dL 151 (H) 143 (H) 132 (H)          History of Primary Hyperparathyroidism    She had a parathyroidectomy with one lobe removed while the 3 remained in the past.  She reports that her values normalized soon after.  She denies any postoperative complications.  She continues with calcium and vitamin D supplementation.  No history of osteoporosis.  She is on thyroid medication.  She has had recent GI upset issues.     Anxiety and Depression    Depression Screening    Little interest or pleasure in doing things?   Several days  Feeling down, depressed , or hopeless?  Not at all  Trouble falling or staying asleep, or sleeping too much?   Nearly every day  Feeling tired or having little energy?   Nearly every  day  Poor appetite or overeating?   More than half the days  Feeling bad about yourself - or that you are a failure or have let yourself or your family down?  Not al all  Trouble concentrating on things, such as reading the newspaper or watching television?  Several days  Moving or speaking so slowly that other people could have noticed.  Or the opposite - being so fidgety or restless that you have been moving around a lot more than usual?   Not at all  Thoughts that you would be better off dead, or of hurting yourself?   Not at all  Patient Health Questionnaire Score:  (!) 10      She has a long-standing history of anxiety-predominant mood disorder.  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  She is using Lexapro 10 mg daily which she feels keeps her mood even keel.  She denies any significant mood fluctuations.  No side effects to current regiment.     Hypothyroidism       Ref. Range 5/15/2019 06:35   TSH Latest Ref Range: 0.380 - 5.330 uIU/mL 2.810       She has maintained on levothyroxine with stable thyroid numbers.  She is currently utilizing 75 mcg daily.  No tremor, changes in hair or nails, or palpitations.    She has history of constipation but more recently has developed diarrhea.          Current Medications:  Current Outpatient Medications Ordered in Epic   Medication Sig Dispense Refill   • levothyroxine (SYNTHROID) 75 MCG Tab Take 1 tablet by mouth Every morning on an empty stomach. 90 tablet 3   • cyclobenzaprine (FLEXERIL) 10 mg Tab Take 1 tablet by mouth 3 times a day as needed for Muscle Spasms. 30 tablet 3   • escitalopram (LEXAPRO) 20 MG tablet TAKE 1 TABLET BY MOUTH EVERY DAY 90 Tab 1   • estradiol (ESTRACE) 0.5 MG tablet TAKE 1/2 TABLET BY MOUTH DAILY 45 Tab 2   • albuterol 108 (90 Base) MCG/ACT Aero Soln inhalation aerosol Inhale 2 Puffs by mouth.     • fexofenadine (ALLEGRA) 30 MG  "tablet Take 30 mg by mouth.     • sumatriptan (IMITREX) 50 MG Tab Take 50 mg by mouth Once PRN.     • Calcium Carb-Cholecalciferol (CALCIUM 1000 + D PO) Take 1 Cap by mouth 1 time daily as needed.     • Omega-3 Fatty Acids (FISH OIL) 1200 MG Cap Take  by mouth.       No current Epic-ordered facility-administered medications on file.          Objective:   Physical Exam:    Vitals: /60 (BP Location: Right arm, Patient Position: Sitting, BP Cuff Size: Adult)   Pulse 74   Temp 36.4 °C (97.5 °F) (Temporal)   Ht 1.765 m (5' 9.5\")   Wt 64.2 kg (141 lb 9.6 oz)   SpO2 97%    BMI: Body mass index is 20.61 kg/m².  Physical Exam   Constitutional: She is well-developed, well-nourished, and in no distress.   Cardiovascular: Normal rate, regular rhythm and normal heart sounds.   No murmur heard.  Pulmonary/Chest: Effort normal and breath sounds normal. No respiratory distress. She has no wheezes.   Neurological: She is alert.   Skin: Skin is warm and dry. No rash noted.   Psychiatric: Mood, memory, affect and judgment normal.             Assessment and Plan:   Malou is a 70 y.o. female with the following:  Problem List Items Addressed This Visit     Anxiety and depression     Chronic and ongoing problem.  She feels like once her health situation improves her mood will also improve.  She believes her current dose of Lexapro 10 mg daily is working well for her.  She does not desire to increase at this time.         History of primary hyperparathyroidism     Previous problem that requires follow-up.  Due to recent episode of abdominal pain and diarrhea I think be worthwhile to update her parathyroid hormone to make sure there is not been recurrence of primary hyperparathyroidism.  Patient agrees.  Labs have been ordered.         Relevant Orders    PTH INTACT (PTH ONLY)    Hypothyroidism     Previous problem that requires follow-up.  Due to worsening diarrhea and fatigue I think be reasonable to update her thyroid function " and adjust her levothyroxine as indicated.  Of note, the Synthroid has become more expensive so she would like to transition back onto generic levothyroxine.  Okay to continue 75 mcg daily at this time.         Relevant Medications    levothyroxine (SYNTHROID) 75 MCG Tab    Other Relevant Orders    Basic Metabolic Panel    Mixed hyperlipidemia     Chronic and ongoing problem.  Her LDL improved on red yeast rice extract however she developed intolerable side effects.  We will keep her off red yeast rice extract and statin medicine at this time.  We will complete coronary calcium score to see if she has clinical ASCVD to better stratify her risk of CAD moving forward.         Fatigue     Chronic and decompensated issue.  At this time we will obtain an overnight pulse oximetry to screen for obstructive or central sleep apnea.  I will also refer her to Dr. Karli Poole for cognitive behavioral therapy related to her insomnia.  She would like to avoid medications due to grogginess the following morning which I think is very reasonable.  If her overnight pulse oximetry would be positive then we would talk about scheduling a formal sleep study.         Asymptomatic microscopic hematuria     New problem that requires additional evaluation.  Will recheck urinalysis to see if occult blood is still present.  If so then could do 1 of 2 things.  Could proceed with a CT pelvis urogram to look for upstream obstruction leading to the occult hematuria.  Also could consider referral to urology for cystoscopy for direct visualization.  We will decide upon course based off follow-up urinalysis.         Relevant Orders    URINALYSIS,CULTURE IF INDICATED    Insomnia     Chronic and decompensated problem.  Will refer her to Dr. Karli poole for CBT at the [facility].  We will also perform overnight pulse oximetry to screen for underlying sleep disorder.         Relevant Orders    REFERRAL TO PSYCHOLOGY      Other Visit  Diagnoses     Other specified personal risk factors, not elsewhere classified        Relevant Orders    CT-HEART W/O CONT EVAL CALCIUM           RTC: Return in about 1 month (around 4/15/2021).    I spent a total of 45 minutes with record review, exam, communication with the patient, communication with other providers, and documentation of this encounter.    PLEASE NOTE: This dictation was created using voice recognition software. I have made every reasonable attempt to correct obvious errors, but I expect that there are errors of grammar and possibly content that I did not discover before finalizing the note.      Melba Hamilton, DO  Geriatric and Internal Medicine  [facility]

## 2021-03-15 NOTE — ASSESSMENT & PLAN NOTE
Previous problem that requires follow-up.  Due to worsening diarrhea and fatigue I think be reasonable to update her thyroid function and adjust her levothyroxine as indicated.  Of note, the Synthroid has become more expensive so she would like to transition back onto generic levothyroxine.  Okay to continue 75 mcg daily at this time.

## 2021-03-15 NOTE — ASSESSMENT & PLAN NOTE
Previous problem that requires follow-up.  Due to recent episode of abdominal pain and diarrhea I think be worthwhile to update her parathyroid hormone to make sure there is not been recurrence of primary hyperparathyroidism.  Patient agrees.  Labs have been ordered.

## 2021-03-15 NOTE — ASSESSMENT & PLAN NOTE
New problem that requires additional evaluation.  Will recheck urinalysis to see if occult blood is still present.  If so then could do 1 of 2 things.  Could proceed with a CT pelvis urogram to look for upstream obstruction leading to the occult hematuria.  Also could consider referral to urology for cystoscopy for direct visualization.  We will decide upon course based off follow-up urinalysis.

## 2021-03-15 NOTE — ASSESSMENT & PLAN NOTE
Chronic and decompensated problem.  Will refer her to Dr. Karli snow for CBT at the [facility].  We will also perform overnight pulse oximetry to screen for underlying sleep disorder.

## 2021-03-16 ENCOUNTER — APPOINTMENT (OUTPATIENT)
Dept: PHYSICAL THERAPY | Facility: REHABILITATION | Age: 71
End: 2021-03-16
Attending: INTERNAL MEDICINE
Payer: MEDICARE

## 2021-03-19 ENCOUNTER — HOSPITAL ENCOUNTER (OUTPATIENT)
Dept: RADIOLOGY | Facility: MEDICAL CENTER | Age: 71
End: 2021-03-19
Attending: INTERNAL MEDICINE
Payer: COMMERCIAL

## 2021-03-19 DIAGNOSIS — Z91.89 OTHER SPECIFIED PERSONAL RISK FACTORS, NOT ELSEWHERE CLASSIFIED: ICD-10-CM

## 2021-03-19 PROCEDURE — 4410556 CT-CARDIAC SCORING (SELF PAY ONLY)

## 2021-03-20 ENCOUNTER — HOSPITAL ENCOUNTER (OUTPATIENT)
Dept: RADIOLOGY | Facility: MEDICAL CENTER | Age: 71
End: 2021-03-20
Attending: INTERNAL MEDICINE
Payer: MEDICARE

## 2021-03-20 DIAGNOSIS — Z12.31 VISIT FOR SCREENING MAMMOGRAM: ICD-10-CM

## 2021-03-20 PROCEDURE — 77063 BREAST TOMOSYNTHESIS BI: CPT

## 2021-03-22 ENCOUNTER — PHYSICAL THERAPY (OUTPATIENT)
Dept: PHYSICAL THERAPY | Facility: REHABILITATION | Age: 71
End: 2021-03-22
Attending: INTERNAL MEDICINE
Payer: MEDICARE

## 2021-03-22 DIAGNOSIS — M25.562 CHRONIC PAIN OF LEFT KNEE: ICD-10-CM

## 2021-03-22 DIAGNOSIS — M54.40 CHRONIC LOW BACK PAIN WITH SCIATICA, SCIATICA LATERALITY UNSPECIFIED, UNSPECIFIED BACK PAIN LATERALITY: ICD-10-CM

## 2021-03-22 DIAGNOSIS — G89.29 CHRONIC PAIN OF LEFT KNEE: ICD-10-CM

## 2021-03-22 DIAGNOSIS — G89.29 CHRONIC LOW BACK PAIN WITH SCIATICA, SCIATICA LATERALITY UNSPECIFIED, UNSPECIFIED BACK PAIN LATERALITY: ICD-10-CM

## 2021-03-22 PROCEDURE — 97140 MANUAL THERAPY 1/> REGIONS: CPT

## 2021-03-22 PROCEDURE — 97110 THERAPEUTIC EXERCISES: CPT

## 2021-03-22 NOTE — OP THERAPY DAILY TREATMENT
Outpatient Physical Therapy  DAILY TREATMENT     [facility] [address] [facility], [address]  [place] [address]  Phone:  [phone]    Date: 03/22/2021    Patient: Malou Pretty  YOB: 1950  MRN: [id number]     Time Calculation    Start time: 1035  Stop time: 1108 Time Calculation (min): 33 minutes         Chief Complaint: No chief complaint on file.    Visit #: 6    SUBJECTIVE:  505 BETTER, OFF ALL SUPPLEMENTS     OBJECTIVE:  Current objective measures:           Therapeutic Exercises (CPT 33342):     1. REIL, x 10    2. Rachael, x 10 every 15 min with walking    4. Ball bridge, 10 x 10 sec with 2 x 30 sec    5. Bird dog on ball, x 10 each    6. Shuttle press 4 cords Dl/2 cords SL , x 20 each    7. Sit to stand, x 10 with neutral spine    8. Repeated thoracic extension with chairback overpressure, 3 x 6    9. Cervical retraction seated, 3 x 5    10. Clamsheslls pink tband 90/90, x 10    11. Step up/down , x 10 each with 1 hand support    12. Lateral step up/downs, x 10      Therapeutic Exercise Summary:  Access Code: [email or web address]   URL: [email or web address]   Date: 02/16/2021   Prepared by: Jamee Beauchamp      Exercises Bridge with Heels on Swiss Ball - 10 reps - 3 sets - 1x daily - 7x weekly   Supine Hip and Knee Flexion AROM with Swiss Ball - 10 reps - 3 sets - 1x daily - 7x weekly   Prone Quad Stretch with Towel Roll and Strap - 10 reps - 3 sets - 1x daily - 7x weekly   Wall Squat with Swiss Ball and Resistance Loop - 10 reps - 3 sets - 1x daily - 7x weekly    Access Code: [email or web address]   URL: [email or web address]   Date: 03/09/2021   Prepared by: Jamee Beauchamp      Exercises Bird Dog on Swiss Ball - 10 reps - 3 sets - 1x daily - 7x weekly   Step Up - 10 reps - 3 sets - 1x daily - 7x weekly   Backward Step Up - 10 reps - 3 sets - 1x daily - 7x weekly   Lateral Step Up - 10 reps - 3 sets - 1x daily - 7x weekly   Supine Bridge with Resistance Band - 10 reps - 3  sets - 1x daily - 7x weekly         Therapeutic Treatments and Modalities:     1. Manual Therapy (CPT 94154), IASTM lumbar paraspinals, gentle extension mobilization    2. Neuromuscular Re-education (CPT 18228), neutral spine in sitting, quadraped and standing, weightshifts in standing to encourage symetrical squat/functional movement and     Time-based treatments/modalities:    Physical Therapy Timed Treatment Charges  Manual therapy minutes (CPT 85369): 10 minutes  Therapeutic exercise minutes (CPT 37847): 20 minutes    ASSESSMENT:   Response to treatment: progressing well asymptomatic post treatment.  Next visit in 3 weeks then d/c if indicated.  Patient is significantly better now that she is off all supplements.    PLAN/RECOMMENDATIONS:   Plan for treatment: therapy treatment to continue next visit.  Planned interventions for next visit: E-stim unattended (CPT 62835), manual therapy (CPT 46221), neuromuscular re-education (CPT 17163) and therapeutic exercise (CPT 33373).

## 2021-03-30 ENCOUNTER — HOSPITAL ENCOUNTER (OUTPATIENT)
Dept: RADIOLOGY | Facility: MEDICAL CENTER | Age: 71
End: 2021-03-30
Attending: INTERNAL MEDICINE
Payer: MEDICARE

## 2021-03-30 ENCOUNTER — APPOINTMENT (OUTPATIENT)
Dept: PHYSICAL THERAPY | Facility: REHABILITATION | Age: 71
End: 2021-03-30
Attending: INTERNAL MEDICINE
Payer: MEDICARE

## 2021-03-30 ENCOUNTER — HOSPITAL ENCOUNTER (OUTPATIENT)
Dept: LAB | Facility: MEDICAL CENTER | Age: 71
End: 2021-03-30
Attending: INTERNAL MEDICINE
Payer: MEDICARE

## 2021-03-30 ENCOUNTER — TELEPHONE (OUTPATIENT)
Dept: MEDICAL GROUP | Facility: PHYSICIAN GROUP | Age: 71
End: 2021-03-30

## 2021-03-30 DIAGNOSIS — R92.8 ABNORMAL MAMMOGRAM: ICD-10-CM

## 2021-03-30 DIAGNOSIS — E03.9 HYPOTHYROIDISM, UNSPECIFIED TYPE: ICD-10-CM

## 2021-03-30 DIAGNOSIS — R31.21 ASYMPTOMATIC MICROSCOPIC HEMATURIA: ICD-10-CM

## 2021-03-30 DIAGNOSIS — Z86.39 HISTORY OF PRIMARY HYPERPARATHYROIDISM: ICD-10-CM

## 2021-03-30 LAB
ANION GAP SERPL CALC-SCNC: 8 MMOL/L (ref 7–16)
APPEARANCE UR: CLEAR
BACTERIA #/AREA URNS HPF: NEGATIVE /HPF
BILIRUB UR QL STRIP.AUTO: NEGATIVE
BUN SERPL-MCNC: 17 MG/DL (ref 8–22)
CALCIUM SERPL-MCNC: 8.6 MG/DL (ref 8.5–10.5)
CHLORIDE SERPL-SCNC: 92 MMOL/L (ref 96–112)
CO2 SERPL-SCNC: 28 MMOL/L (ref 20–33)
COLOR UR: YELLOW
CREAT SERPL-MCNC: 0.83 MG/DL (ref 0.5–1.4)
EPI CELLS #/AREA URNS HPF: NEGATIVE /HPF
GLUCOSE SERPL-MCNC: 94 MG/DL (ref 65–99)
GLUCOSE UR STRIP.AUTO-MCNC: NEGATIVE MG/DL
HYALINE CASTS #/AREA URNS LPF: ABNORMAL /LPF
KETONES UR STRIP.AUTO-MCNC: NEGATIVE MG/DL
LEUKOCYTE ESTERASE UR QL STRIP.AUTO: ABNORMAL
MICRO URNS: ABNORMAL
NITRITE UR QL STRIP.AUTO: NEGATIVE
PH UR STRIP.AUTO: 7.5 [PH] (ref 5–8)
POTASSIUM SERPL-SCNC: 4 MMOL/L (ref 3.6–5.5)
PROT UR QL STRIP: NEGATIVE MG/DL
PTH-INTACT SERPL-MCNC: 36.9 PG/ML (ref 14–72)
RBC # URNS HPF: ABNORMAL /HPF
RBC UR QL AUTO: ABNORMAL
SODIUM SERPL-SCNC: 128 MMOL/L (ref 135–145)
SP GR UR STRIP.AUTO: 1.01
UROBILINOGEN UR STRIP.AUTO-MCNC: 0.2 MG/DL
WBC #/AREA URNS HPF: ABNORMAL /HPF

## 2021-03-30 PROCEDURE — 81001 URINALYSIS AUTO W/SCOPE: CPT

## 2021-03-30 PROCEDURE — 36415 COLL VENOUS BLD VENIPUNCTURE: CPT

## 2021-03-30 PROCEDURE — G0279 TOMOSYNTHESIS, MAMMO: HCPCS | Mod: RT

## 2021-03-30 PROCEDURE — 80048 BASIC METABOLIC PNL TOTAL CA: CPT

## 2021-03-30 PROCEDURE — 83970 ASSAY OF PARATHORMONE: CPT

## 2021-03-30 PROCEDURE — 76642 ULTRASOUND BREAST LIMITED: CPT | Mod: RT

## 2021-03-30 NOTE — TELEPHONE ENCOUNTER
Received fax from [facility] stating they had attempted to contact pt on 3/19, 3/29, and 3/30 with no success.   Called pt, she had not been answering phone calls because she reports she is not sure who is calling.  Explained why [facility] is calling and pt said she will call [facility] today.

## 2021-04-06 ENCOUNTER — APPOINTMENT (OUTPATIENT)
Dept: PHYSICAL THERAPY | Facility: REHABILITATION | Age: 71
End: 2021-04-06
Attending: INTERNAL MEDICINE
Payer: MEDICARE

## 2021-04-12 ENCOUNTER — APPOINTMENT (OUTPATIENT)
Dept: PHYSICAL THERAPY | Facility: REHABILITATION | Age: 71
End: 2021-04-12
Attending: INTERNAL MEDICINE
Payer: MEDICARE

## 2021-04-20 ENCOUNTER — TELEPHONE (OUTPATIENT)
Dept: MEDICAL GROUP | Facility: PHYSICIAN GROUP | Age: 71
End: 2021-04-20

## 2021-04-21 ENCOUNTER — TELEPHONE (OUTPATIENT)
Dept: MEDICAL GROUP | Facility: PHYSICIAN GROUP | Age: 71
End: 2021-04-21

## 2021-04-21 NOTE — TELEPHONE ENCOUNTER
ESTABLISHED PATIENT PRE-VISIT PLANNING     Patient was NOT contacted to complete PVP.     Note: Patient will not be contacted if there is no indication to call.     1.  Reviewed notes from the last few office visits within the medical group: Yes    2.  If any orders were placed at last visit or intended to be done for this visit (i.e. 6 mos follow-up), do we have Results/Consult Notes?         •  Labs - Labs ordered, completed on 03/30/2021 and results are in chart.  Note: If patient appointment is for lab review and patient did not complete labs, check with provider if OK to reschedule patient until labs completed.       •  Imaging - Imaging ordered, completed and results are in chart.       •  Referrals - Referral ordered, patient has NOT been seen.    3. Is this appointment scheduled as a Hospital Follow-Up? No    4.  Immunizations were updated in Epic using Reconcile Outside Information activity? Yes    5.  Patient is due for the following Health Maintenance Topics:   Health Maintenance Due   Topic Date Due   • IMM DTaP/Tdap/Td Vaccine (1 - Tdap) 06/14/1969   • Annual Wellness Visit  03/31/2021       - Patient plans to schedule appointment for Annual Wellness Visit (AWV).    6.  AHA (Pulse8) form printed for Provider? N/A

## 2021-04-22 ENCOUNTER — HOSPITAL ENCOUNTER (OUTPATIENT)
Facility: MEDICAL CENTER | Age: 71
End: 2021-04-22
Attending: INTERNAL MEDICINE
Payer: MEDICARE

## 2021-04-22 ENCOUNTER — OFFICE VISIT (OUTPATIENT)
Dept: MEDICAL GROUP | Facility: PHYSICIAN GROUP | Age: 71
End: 2021-04-22
Payer: MEDICARE

## 2021-04-22 VITALS
DIASTOLIC BLOOD PRESSURE: 68 MMHG | WEIGHT: 141.3 LBS | HEIGHT: 70 IN | BODY MASS INDEX: 20.23 KG/M2 | SYSTOLIC BLOOD PRESSURE: 120 MMHG | OXYGEN SATURATION: 96 % | HEART RATE: 75 BPM | TEMPERATURE: 97.1 F

## 2021-04-22 DIAGNOSIS — Z23 NEED FOR DIPHTHERIA-TETANUS-PERTUSSIS (TDAP) VACCINE: ICD-10-CM

## 2021-04-22 DIAGNOSIS — R31.21 ASYMPTOMATIC MICROSCOPIC HEMATURIA: ICD-10-CM

## 2021-04-22 DIAGNOSIS — R93.1 AGATSTON CAC SCORE 100-199: ICD-10-CM

## 2021-04-22 DIAGNOSIS — E87.1 HYPONATREMIA: ICD-10-CM

## 2021-04-22 LAB
ANION GAP SERPL CALC-SCNC: 11 MMOL/L (ref 7–16)
APPEARANCE UR: CLEAR
BACTERIA #/AREA URNS HPF: NEGATIVE /HPF
BILIRUB UR QL STRIP.AUTO: NEGATIVE
BUN SERPL-MCNC: 17 MG/DL (ref 8–22)
CALCIUM SERPL-MCNC: 8.5 MG/DL (ref 8.5–10.5)
CHLORIDE SERPL-SCNC: 95 MMOL/L (ref 96–112)
CO2 SERPL-SCNC: 26 MMOL/L (ref 20–33)
COLOR UR: YELLOW
CREAT SERPL-MCNC: 0.91 MG/DL (ref 0.5–1.4)
EPI CELLS #/AREA URNS HPF: ABNORMAL /HPF
GLUCOSE SERPL-MCNC: 76 MG/DL (ref 65–99)
GLUCOSE UR STRIP.AUTO-MCNC: NEGATIVE MG/DL
KETONES UR STRIP.AUTO-MCNC: NEGATIVE MG/DL
LEUKOCYTE ESTERASE UR QL STRIP.AUTO: NEGATIVE
MICRO URNS: ABNORMAL
NITRITE UR QL STRIP.AUTO: NEGATIVE
PH UR STRIP.AUTO: 6.5 [PH] (ref 5–8)
POTASSIUM SERPL-SCNC: 4.4 MMOL/L (ref 3.6–5.5)
PROT UR QL STRIP: NEGATIVE MG/DL
RBC # URNS HPF: ABNORMAL /HPF
RBC UR QL AUTO: ABNORMAL
SODIUM SERPL-SCNC: 132 MMOL/L (ref 135–145)
SP GR UR STRIP.AUTO: 1.01
UROBILINOGEN UR STRIP.AUTO-MCNC: 0.2 MG/DL
WBC #/AREA URNS HPF: ABNORMAL /HPF

## 2021-04-22 PROCEDURE — 90715 TDAP VACCINE 7 YRS/> IM: CPT | Performed by: INTERNAL MEDICINE

## 2021-04-22 PROCEDURE — 90471 IMMUNIZATION ADMIN: CPT | Performed by: INTERNAL MEDICINE

## 2021-04-22 PROCEDURE — 80048 BASIC METABOLIC PNL TOTAL CA: CPT

## 2021-04-22 PROCEDURE — 36415 COLL VENOUS BLD VENIPUNCTURE: CPT | Performed by: INTERNAL MEDICINE

## 2021-04-22 PROCEDURE — 99214 OFFICE O/P EST MOD 30 MIN: CPT | Mod: 25 | Performed by: INTERNAL MEDICINE

## 2021-04-22 PROCEDURE — 99000 SPECIMEN HANDLING OFFICE-LAB: CPT | Performed by: INTERNAL MEDICINE

## 2021-04-22 PROCEDURE — 81001 URINALYSIS AUTO W/SCOPE: CPT

## 2021-04-22 RX ORDER — FAMOTIDINE 20 MG/1
20 TABLET, FILM COATED ORAL
COMMUNITY

## 2021-04-22 ASSESSMENT — FIBROSIS 4 INDEX: FIB4 SCORE: 2.23

## 2021-04-22 NOTE — PROGRESS NOTES
Subjective:   Chief Complaint/History of Present Illness:  Malou Pretty is a 70 y.o. female established patient who presents today to discuss medical problems as listed below. Malou is unaccompanied for today's visit.    Problem   Hyponatremia    She has had intermittent episodes of hyponatremia since at least 2016.  She admits to profuse free water intake.  She also feels like she perspires a fair amount because of how active she is.  She does not recall having any previous evaluation into underlying etiology of her hyponatremia.  She has been told in the past to increase intake of salt-containing foods.    Sodium in late March 2021 was found to be 128.  Thyroid function stable.  She appears euvolemic.  She is on SSRI therapy and this could be a component of SIADH.  She is asymptomatic without GI upset, nausea/vomiting, or changes in mentation.     Agatston CAC score 100-199- March 2021    CT coronary calcium score (3/2021):  Coronary calcification:  LMA - 36.2  LCX - 0.0  LAD - 65.3  RCA - 0.0  PDA - 0.0     Total Calcium Score: 101.6     Percentile: Calcium score is above the 50th percentile for the patient's age and sex.     IMPRESSION: Calcium Score of 1-99 AND <75th percentile    She is at the 50th percentile on her coronary calcium score.  She is previously using red yeast rice extract but is holding at this time.  No personal history of angina dyspnea on exertion.  Discussed utility of stress testing but if she is asymptomatic at this time she would like to observe.         Asymptomatic Microscopic Hematuria       Ref. Range 3/12/2021 17:39 3/12/2021 18:26 3/30/2021 06:50   Occult Blood Latest Ref Range: Negative  Moderate (A)  Small (A)     She has moderate occult blood on recent urinalysis in the ED.  She reports history of vaginal atrophy and did not use an alcohol swab prior to giving the sample.  No gross hematuria noted.  On follow-up she continued to have a trace amount of occult blood on her  "urinalysis.    She admits to periodic use of aspirin 81 mg daily but none preceding the abnormal urinalysis.  No known history of nephritic or nephrotic syndrome.  She is not taking anti-inflammatories or other blood thinning medicines.          Current Medications:  Current Outpatient Medications Ordered in Epic   Medication Sig Dispense Refill   • famotidine (PEPCID) 20 MG Tab Take 20 mg by mouth 1 time a day as needed for Heartburn.     • aspirin EC (ECOTRIN) 81 MG Tablet Delayed Response Take 81 mg by mouth 1 time a day as needed.     • baclofen (LIORESAL) 10 MG Tab Take 10 mg by mouth 3 times a day.     • levothyroxine (SYNTHROID) 75 MCG Tab Take 1 tablet by mouth Every morning on an empty stomach. 90 tablet 3   • escitalopram (LEXAPRO) 20 MG tablet TAKE 1 TABLET BY MOUTH EVERY DAY 90 Tab 1   • estradiol (ESTRACE) 0.5 MG tablet TAKE 1/2 TABLET BY MOUTH DAILY 45 Tab 2   • albuterol 108 (90 Base) MCG/ACT Aero Soln inhalation aerosol Inhale 2 Puffs by mouth.     • fexofenadine (ALLEGRA) 30 MG tablet Take 30 mg by mouth.     • sumatriptan (IMITREX) 50 MG Tab Take 50 mg by mouth Once PRN.     • Calcium Carb-Cholecalciferol (CALCIUM 1000 + D PO) Take 1 Cap by mouth 1 time daily as needed.     • Omega-3 Fatty Acids (FISH OIL) 1200 MG Cap Take  by mouth.       No current Epic-ordered facility-administered medications on file.          Objective:   Physical Exam:    Vitals: /68 (BP Location: Right arm, Patient Position: Sitting, BP Cuff Size: Adult)   Pulse 75   Temp 36.2 °C (97.1 °F) (Temporal)   Ht 1.765 m (5' 9.5\")   Wt 64.1 kg (141 lb 4.8 oz)   SpO2 96%    BMI: Body mass index is 20.57 kg/m².  Physical Exam   Eyes: Conjunctivae are normal. No scleral icterus.   Cardiovascular: Normal rate, regular rhythm and normal heart sounds.   Pulmonary/Chest: Effort normal and breath sounds normal. No respiratory distress.   Musculoskeletal:         General: No edema.   Skin: No rash noted.   Scattered ecchymosis on " bilateral upper extremities.   Psychiatric: Mood, memory, affect and judgment normal.          Assessment and Plan:   Malou is a 70 y.o. female with the following:  Problem List Items Addressed This Visit     Asymptomatic microscopic hematuria     Previous problem, ongoing.  She continues to have blood present in the urine with 2-5 RBCs on microscopic evaluation.  Underlying etiology not clear at this time.  Will repeat one additional time as she did improve from moderate to small amount on the last check in March 2021.  If she continues to have hematuria would proceed with a CT urogram to evaluate for obstruction in the genitourinary system.  If there is large amount of hematuria would consider more urgent cystoscopy through urology.  Unlikely to need a renal ultrasound due to normal GFR.  She is agreeable and we will discuss results over my chart to determine next steps in evaluation.         Relevant Orders    URINALYSIS,CULTURE IF INDICATED    Hyponatremia     New problem by my evaluation but chronic per patient, decompensated.  Last sodium 128.  Since that time she has decreased free water intake and replaced it with electrolyte containing fluids.  Discussed with her that hyponatremia is a relative water imbalance and that increasing salt is not usually what is needed to be done.  She could have a component of SIADH due to chronic SSRI therapy.  She appears to be euthyroid so I do not think it is stemming from her levothyroxine.  She is euvolemic on exam making a nephrotic, cirrhotic, or congestive heart failure etiology unlikely.  Will recheck nonfasting metabolic panel in clinic today and can consider additional work-up including urine electrolytes and urine/serum osmolality for full work-up.         Relevant Orders    Basic Metabolic Panel    Agatston CAC score 100-199- March 2021     New problem.  She has mild elevation on her CT coronary calcium test placing her in the 50th percentile.  She is previously  using red yeast rice extract but stopped it along with her other supplements when she had an ER visit in March 2021.  Discussed that we could obtain baseline stress testing but if she is asymptomatic and very active at this time it is reasonable to observe.           Other Visit Diagnoses     Need for diphtheria-tetanus-pertussis (Tdap) vaccine        Relevant Orders    Tdap =>6yo IM (Completed)                 RTC: Return in about 6 months (around 10/22/2021).    I spent a total of 35 minutes with record review, exam, communication with the patient, communication with other providers, and documentation of this encounter.    PLEASE NOTE: This dictation was created using voice recognition software. I have made every reasonable attempt to correct obvious errors, but I expect that there are errors of grammar and possibly content that I did not discover before finalizing the note.      Melba Hamilton, DO  Geriatric and Internal Medicine  [facility]

## 2021-04-22 NOTE — NON-PROVIDER
Verified patient’s name/date-of-birth and reason for visit before procedure was started. Patient’s left antecubital cleansed. Venipuncture attempts X1. Blood draw completed on patient’s left AC. Applied pressure afterwards and placed Coban on site of venipuncture with directions for patient to remove within the next hour. Patient tolerated procedure well, no adverse reactions. Patient ambulated safely upon leaving clinic.   Completed labels were placed on blood samples in draw room with patient present. Appropriate blood samples were centrifuged. Blood samples were then placed in locked lab box for  pick-up.

## 2021-04-22 NOTE — ASSESSMENT & PLAN NOTE
New problem by my evaluation but chronic per patient, decompensated.  Last sodium 128.  Since that time she has decreased free water intake and replaced it with electrolyte containing fluids.  Discussed with her that hyponatremia is a relative water imbalance and that increasing salt is not usually what is needed to be done.  She could have a component of SIADH due to chronic SSRI therapy.  She appears to be euthyroid so I do not think it is stemming from her levothyroxine.  She is euvolemic on exam making a nephrotic, cirrhotic, or congestive heart failure etiology unlikely.  Will recheck nonfasting metabolic panel in clinic today and can consider additional work-up including urine electrolytes and urine/serum osmolality for full work-up.

## 2021-04-22 NOTE — ASSESSMENT & PLAN NOTE
New problem.  She has mild elevation on her CT coronary calcium test placing her in the 50th percentile.  She is previously using red yeast rice extract but stopped it along with her other supplements when she had an ER visit in March 2021.  Discussed that we could obtain baseline stress testing but if she is asymptomatic and very active at this time it is reasonable to observe.

## 2021-04-22 NOTE — ASSESSMENT & PLAN NOTE
Previous problem, ongoing.  She continues to have blood present in the urine with 2-5 RBCs on microscopic evaluation.  Underlying etiology not clear at this time.  Will repeat one additional time as she did improve from moderate to small amount on the last check in March 2021.  If she continues to have hematuria would proceed with a CT urogram to evaluate for obstruction in the genitourinary system.  If there is large amount of hematuria would consider more urgent cystoscopy through urology.  Unlikely to need a renal ultrasound due to normal GFR.  She is agreeable and we will discuss results over my chart to determine next steps in evaluation.

## 2021-04-27 ENCOUNTER — HOSPITAL ENCOUNTER (OUTPATIENT)
Dept: RADIOLOGY | Facility: MEDICAL CENTER | Age: 71
End: 2021-04-27
Attending: INTERNAL MEDICINE
Payer: MEDICARE

## 2021-04-27 DIAGNOSIS — R31.21 ASYMPTOMATIC MICROSCOPIC HEMATURIA: ICD-10-CM

## 2021-04-27 DIAGNOSIS — N13.30 HYDRONEPHROSIS OF RIGHT KIDNEY: ICD-10-CM

## 2021-04-27 PROCEDURE — 76775 US EXAM ABDO BACK WALL LIM: CPT

## 2021-04-28 NOTE — PROGRESS NOTES
Please call Malou to have her schedule CT to evaluate for right ureter stone (kidney stone) as recommended by radiology. Renal ultrasound showed hydronephrosis (urine backed up in the kidney) and we need to evaluate further for stone or other obstruction.

## 2021-04-29 ENCOUNTER — HOSPITAL ENCOUNTER (OUTPATIENT)
Dept: RADIOLOGY | Facility: MEDICAL CENTER | Age: 71
End: 2021-04-29
Attending: INTERNAL MEDICINE
Payer: MEDICARE

## 2021-04-29 DIAGNOSIS — N13.30 HYDRONEPHROSIS OF RIGHT KIDNEY: ICD-10-CM

## 2021-04-29 PROCEDURE — 74176 CT ABD & PELVIS W/O CONTRAST: CPT | Mod: MG

## 2021-04-30 ENCOUNTER — TELEPHONE (OUTPATIENT)
Dept: MEDICAL GROUP | Facility: PHYSICIAN GROUP | Age: 71
End: 2021-04-30

## 2021-05-03 ENCOUNTER — TELEPHONE (OUTPATIENT)
Dept: MEDICAL GROUP | Facility: PHYSICIAN GROUP | Age: 71
End: 2021-05-03

## 2021-05-03 NOTE — TELEPHONE ENCOUNTER
Please send Malou a message and let her know that I left her a voicemail.  In summary she is okay to go on vacation and we can see her when she returns for me to perform an exam and decide on follow-up imaging of an ultrasound versus a CT scan with IV contrast.

## 2021-05-03 NOTE — TELEPHONE ENCOUNTER
1. Caller Name: Malou Pretty                          Call Back Number: [phone] (home) [phone] (work)        How would the patient prefer to be contacted with a response: Phone call OK to leave a detailed message    Waiting to go on vacation until she hears details from Dr. Hamilton.

## 2021-05-18 ENCOUNTER — TELEPHONE (OUTPATIENT)
Dept: MEDICAL GROUP | Facility: PHYSICIAN GROUP | Age: 71
End: 2021-05-18

## 2021-05-18 NOTE — TELEPHONE ENCOUNTER
ESTABLISHED PATIENT PRE-VISIT PLANNING     Patient was NOT contacted to complete PVP.     Note: Patient will not be contacted if there is no indication to call.     1.  Reviewed notes from the last few office visits within the medical group: Yes    2.  If any orders were placed at last visit or intended to be done for this visit (i.e. 6 mos follow-up), do we have Results/Consult Notes?         •  Labs - Labs ordered, completed on 4/22/21 and results are in chart.  Note: If patient appointment is for lab review and patient did not complete labs, check with provider if OK to reschedule patient until labs completed.       •  Imaging - Imaging ordered, completed and results are in chart.       •  Referrals - Referral ordered, patient has NOT been seen.    3. Is this appointment scheduled as a Hospital Follow-Up? No    4.  Immunizations were updated in Epic using Reconcile Outside Information activity? Yes    5.  Patient is due for the following Health Maintenance Topics:   Health Maintenance Due   Topic Date Due   • Annual Wellness Visit  03/31/2021       - Patient plans to schedule appointment for Annual Wellness Visit (AWV).    6.  AHA (Pulse8) form printed for Provider? No, already completed

## 2021-05-19 ENCOUNTER — OFFICE VISIT (OUTPATIENT)
Dept: MEDICAL GROUP | Facility: PHYSICIAN GROUP | Age: 71
End: 2021-05-19
Payer: MEDICARE

## 2021-05-19 VITALS
SYSTOLIC BLOOD PRESSURE: 136 MMHG | OXYGEN SATURATION: 96 % | RESPIRATION RATE: 12 BRPM | HEART RATE: 72 BPM | HEIGHT: 70 IN | WEIGHT: 142 LBS | BODY MASS INDEX: 20.33 KG/M2 | DIASTOLIC BLOOD PRESSURE: 80 MMHG | TEMPERATURE: 97.2 F

## 2021-05-19 DIAGNOSIS — R31.21 ASYMPTOMATIC MICROSCOPIC HEMATURIA: ICD-10-CM

## 2021-05-19 DIAGNOSIS — I87.2 VENOUS INSUFFICIENCY OF BOTH LOWER EXTREMITIES: ICD-10-CM

## 2021-05-19 PROCEDURE — 99213 OFFICE O/P EST LOW 20 MIN: CPT | Performed by: INTERNAL MEDICINE

## 2021-05-19 ASSESSMENT — FIBROSIS 4 INDEX: FIB4 SCORE: 2.23

## 2021-05-19 NOTE — LETTER
[employer]  Melba Haimlton D.O.  [address]  [place] [address]  Fax: [phone]   Authorization for Release/Disclosure of   Protected Health Information   Name: PHONG PONCE : 1950 SSN: [id number]   Address: [address] [place] [address]  [place] [address] Phone:    [phone] (home) [phone] (work)   I authorize the entity listed below to release/disclose the PHI below to:   [employer]/Melba Hamilton D.O. and Melba Hamilton D.O.   Provider or Entity Name:  Karli Poole   Address   City, State, [facility]   Phone:      Fax:     Reason for request: continuity of care   Information to be released: consult   [  ] LAST COLONOSCOPY,  including any PATH REPORT and follow-up  [  ] LAST FIT/COLOGUARD RESULT [  ] LAST DEXA  [  ] LAST MAMMOGRAM  [  ] LAST PAP  [  ] LAST LABS [  ] RETINA EXAM REPORT  [  ] IMMUNIZATION RECORDS  [ x ] Release all info      [ x ] Check here and initial the line next to each item to release ALL health information INCLUDING  _____ Care and treatment for drug and / or alcohol abuse  _____ HIV testing, infection status, or AIDS  _____ Genetic Testing    DATES OF SERVICE OR TIME PERIOD TO BE DISCLOSED: _3/2021____________  I understand and acknowledge that:  * This Authorization may be revoked at any time by you in writing, except if your health information has already been used or disclosed.  * Your health information that will be used or disclosed as a result of you signing this authorization could be re-disclosed by the recipient. If this occurs, your re-disclosed health information may no longer be protected by State or Federal laws.  * You may refuse to sign this Authorization. Your refusal will not affect your ability to obtain treatment.  * This Authorization becomes effective upon signing and will  on (date) __________.      If no date is indicated, this Authorization will  one (1) year from the signature date.    Name: Phong Davis  Maria De Jesus    Signature:   Date:     5/19/2021       PLEASE FAX REQUESTED RECORDS BACK TO: [phone]

## 2021-05-19 NOTE — PROGRESS NOTES
"Subjective:   Chief Complaint/History of Present Illness:  Malou Pretty is a 70 y.o. female established patient who presents today to discuss medical problems as listed below. Malou is unaccompanied for today's visit.    Problem   Asymptomatic Microscopic Hematuria       Ref. Range 3/12/2021 17:39 3/12/2021 18:26 3/30/2021 06:50   Occult Blood Latest Ref Range: Negative  Moderate (A)  Small (A)     Renal US (4/27/21): Moderate right hydronephrosis with some tenderness experienced during transducer pressure. This is worrisome for a ureteral calculus favored over mass and unenhanced renal colic CT is recommended to further characterize    CT Renal Colic (4/29/21):   1.  There is no evidence of nephrolithiasis or urolithiasis.  2.  There is retroperitoneal opacity which could be due to 2 lymphadenopathy versus unopacified loops of bowel or prominent vascular structures. Evaluation is limited on this noncontrast exam due to lack of intra-abdominal fat.  3.  There are slightly prominent bilateral extrarenal pelves but there is no overt hydronephrosis.      She has moderate occult blood on recent urinalysis in the ED.  She reports history of vaginal atrophy and did not use an alcohol swab prior to giving the sample.  No gross hematuria noted.  On follow-up she continued to have a trace amount of occult blood on her urinalysis. Urinalysis completed as above.    She admits to periodic use of aspirin 81 mg daily but none preceding the abnormal urinalysis.  No known history of nephritic or nephrotic syndrome.  She is not taking anti-inflammatories or other blood thinning medicines.     Venous Insufficiency of Both Lower Extremities    She reports challenges with intermittent swelling of the lower legs, right greater than left, and has seen [facility] (Dr. Torres) in the past.  She shares that her left saphenous was \"closed\" in 2012.  She thinks she may have had sclerotherapy on the left lower extremity and also " "completed a vein procedure in August 2019 on the right leg.      She also reports periodic swelling/pain in the right greater than left groin region.  She feels like this is related to her previous vein procedures in the lower legs.          Current Medications:  Current Outpatient Medications Ordered in Epic   Medication Sig Dispense Refill   • famotidine (PEPCID) 20 MG Tab Take 20 mg by mouth 1 time a day as needed for Heartburn.     • aspirin EC (ECOTRIN) 81 MG Tablet Delayed Response Take 81 mg by mouth 1 time a day as needed.     • baclofen (LIORESAL) 10 MG Tab Take 10 mg by mouth 3 times a day.     • levothyroxine (SYNTHROID) 75 MCG Tab Take 1 tablet by mouth Every morning on an empty stomach. 90 tablet 3   • escitalopram (LEXAPRO) 20 MG tablet TAKE 1 TABLET BY MOUTH EVERY DAY 90 Tab 1   • estradiol (ESTRACE) 0.5 MG tablet TAKE 1/2 TABLET BY MOUTH DAILY 45 Tab 2   • albuterol 108 (90 Base) MCG/ACT Aero Soln inhalation aerosol Inhale 2 Puffs by mouth.     • Omega-3 Fatty Acids (FISH OIL) 1200 MG Cap Take  by mouth.     • fexofenadine (ALLEGRA) 30 MG tablet Take 30 mg by mouth.     • sumatriptan (IMITREX) 50 MG Tab Take 50 mg by mouth Once PRN.     • Calcium Carb-Cholecalciferol (CALCIUM 1000 + D PO) Take 1 Cap by mouth 1 time daily as needed.       No current Epic-ordered facility-administered medications on file.          Objective:   Physical Exam:    Vitals: /80 (BP Location: Right arm, Patient Position: Sitting, BP Cuff Size: Adult)   Pulse 72   Temp 36.2 °C (97.2 °F) (Temporal)   Resp 12   Ht 1.765 m (5' 9.5\")   Wt 64.4 kg (142 lb)   SpO2 96%    BMI: Body mass index is 20.67 kg/m².  Physical Exam  Constitutional:       General: She is not in acute distress.     Appearance: She is normal weight.   Eyes:      Conjunctiva/sclera: Conjunctivae normal.   Pulmonary:      Effort: Pulmonary effort is normal. No respiratory distress.   Genitourinary:     Comments: Mildly tender to palpation in right " groin with deep palpation, no bulge appreciated. No lymphadenopathy.  Musculoskeletal:      Comments: No flank pain.   Skin:     General: Skin is warm and dry.      Findings: No rash.   Neurological:      Mental Status: She is alert.   Psychiatric:         Mood and Affect: Mood normal.         Behavior: Behavior normal.         Thought Content: Thought content normal.         Judgment: Judgment normal.          Assessment and Plan:   Malou is a 70 y.o. female with the following:  Problem List Items Addressed This Visit     Venous insufficiency of both lower extremities     Chronic and ongoing problem.  She continues to wonder whether the periodic bulging in her groin is related to the previous vein procedures.  She is mildly tender on palpation when supine.  There is no bulging that I could appreciate or enlarged lymph nodes.  She would like to continue with observation at this time.         Asymptomatic microscopic hematuria     Chronic and ongoing issue.  Will refer her to urology to see if any additional work-up is necessary at this point. She continues to report significant fatigue but otherwise feels stable.         Relevant Orders    REFERRAL TO UROLOGY           RTC: Return in about 5 months (around 10/19/2021).    I spent a total of 29 minutes with record review, exam, communication with the patient, communication with other providers, and documentation of this encounter.    PLEASE NOTE: This dictation was created using voice recognition software. I have made every reasonable attempt to correct obvious errors, but I expect that there are errors of grammar and possibly content that I did not discover before finalizing the note.      Melba Hamilton, DO  Geriatric and Internal Medicine  [facility]

## 2021-05-20 NOTE — ASSESSMENT & PLAN NOTE
Chronic and ongoing issue.  Will refer her to urology to see if any additional work-up is necessary at this point. She continues to report significant fatigue but otherwise feels stable.

## 2021-05-20 NOTE — ASSESSMENT & PLAN NOTE
Chronic and ongoing problem.  She continues to wonder whether the periodic bulging in her groin is related to the previous vein procedures.  She is mildly tender on palpation when supine.  There is no bulging that I could appreciate or enlarged lymph nodes.  She would like to continue with observation at this time.

## 2021-06-03 ENCOUNTER — TELEPHONE (OUTPATIENT)
Dept: MEDICAL GROUP | Facility: PHYSICIAN GROUP | Age: 71
End: 2021-06-03

## 2021-06-03 NOTE — TELEPHONE ENCOUNTER
Phone Number Called: [phone] (Referrals Department)    Call outcome: Spoke to Lucy    Message: Informed Lucy that pt has been waiting 2 weeks for urology appt. Lucy stated that she would push the referral ahead to get it processed faster.

## 2021-06-25 ENCOUNTER — HOSPITAL ENCOUNTER (OUTPATIENT)
Dept: RADIOLOGY | Facility: MEDICAL CENTER | Age: 71
End: 2021-06-25
Payer: MEDICARE

## 2021-06-28 ENCOUNTER — HOSPITAL ENCOUNTER (OUTPATIENT)
Dept: LAB | Facility: MEDICAL CENTER | Age: 71
End: 2021-06-28
Attending: PHYSICIAN ASSISTANT
Payer: MEDICARE

## 2021-06-28 LAB
BUN SERPL-MCNC: 22 MG/DL (ref 8–22)
CREAT SERPL-MCNC: 0.98 MG/DL (ref 0.5–1.4)

## 2021-06-28 PROCEDURE — 36415 COLL VENOUS BLD VENIPUNCTURE: CPT

## 2021-06-28 PROCEDURE — 84520 ASSAY OF UREA NITROGEN: CPT

## 2021-06-28 PROCEDURE — 82565 ASSAY OF CREATININE: CPT

## 2021-07-01 ENCOUNTER — APPOINTMENT (OUTPATIENT)
Dept: RADIOLOGY | Facility: MEDICAL CENTER | Age: 71
End: 2021-07-01
Attending: PHYSICIAN ASSISTANT
Payer: MEDICARE

## 2021-07-09 ENCOUNTER — HOSPITAL ENCOUNTER (OUTPATIENT)
Dept: RADIOLOGY | Facility: MEDICAL CENTER | Age: 71
End: 2021-07-09
Attending: PHYSICIAN ASSISTANT
Payer: MEDICARE

## 2021-07-09 DIAGNOSIS — R31.29 OTHER MICROSCOPIC HEMATURIA: ICD-10-CM

## 2021-07-09 PROCEDURE — 74178 CT ABD&PLV WO CNTR FLWD CNTR: CPT | Mod: MH

## 2021-07-09 PROCEDURE — 700117 HCHG RX CONTRAST REV CODE 255: Performed by: PHYSICIAN ASSISTANT

## 2021-07-09 RX ADMIN — IOHEXOL 100 ML: 350 INJECTION, SOLUTION INTRAVENOUS at 11:10

## 2021-07-12 ENCOUNTER — TELEPHONE (OUTPATIENT)
Dept: MEDICAL GROUP | Facility: PHYSICIAN GROUP | Age: 71
End: 2021-07-12

## 2021-07-13 NOTE — TELEPHONE ENCOUNTER
1. Caller Name: Malou Ryan Pretty                          Call Back Number: [phone] (home) [phone] (work)        How would the patient prefer to be contacted with a response: Phone call OK to leave a detailed message    Called Malou  Answered questions

## 2021-08-06 ENCOUNTER — OFFICE VISIT (OUTPATIENT)
Dept: MEDICAL GROUP | Facility: PHYSICIAN GROUP | Age: 71
End: 2021-08-06
Payer: MEDICARE

## 2021-08-06 ENCOUNTER — PATIENT MESSAGE (OUTPATIENT)
Dept: MEDICAL GROUP | Facility: PHYSICIAN GROUP | Age: 71
End: 2021-08-06

## 2021-08-06 VITALS
TEMPERATURE: 98.1 F | HEART RATE: 68 BPM | RESPIRATION RATE: 14 BRPM | DIASTOLIC BLOOD PRESSURE: 78 MMHG | BODY MASS INDEX: 21.12 KG/M2 | WEIGHT: 142.6 LBS | OXYGEN SATURATION: 100 % | SYSTOLIC BLOOD PRESSURE: 130 MMHG | HEIGHT: 69 IN

## 2021-08-06 DIAGNOSIS — K41.90 FEMORAL HERNIA OF RIGHT SIDE: ICD-10-CM

## 2021-08-06 DIAGNOSIS — G43.719 INTRACTABLE CHRONIC MIGRAINE WITHOUT AURA AND WITHOUT STATUS MIGRAINOSUS: ICD-10-CM

## 2021-08-06 DIAGNOSIS — R19.09 LUMP IN THE GROIN: ICD-10-CM

## 2021-08-06 DIAGNOSIS — M15.9 PRIMARY OSTEOARTHRITIS INVOLVING MULTIPLE JOINTS: ICD-10-CM

## 2021-08-06 PROCEDURE — 99213 OFFICE O/P EST LOW 20 MIN: CPT | Performed by: INTERNAL MEDICINE

## 2021-08-06 RX ORDER — BACLOFEN 10 MG/1
10 TABLET ORAL 3 TIMES DAILY
Qty: 90 TABLET | Refills: 1 | Status: SHIPPED | OUTPATIENT
Start: 2021-08-06 | End: 2021-08-06

## 2021-08-06 RX ORDER — SUMATRIPTAN 50 MG/1
50 TABLET, FILM COATED ORAL
Qty: 10 TABLET | Refills: 1 | Status: SHIPPED | OUTPATIENT
Start: 2021-08-06 | End: 2021-08-06

## 2021-08-06 RX ORDER — BACLOFEN 10 MG/1
10 TABLET ORAL 3 TIMES DAILY
Qty: 90 TABLET | Refills: 1 | Status: SHIPPED | OUTPATIENT
Start: 2021-08-06

## 2021-08-06 RX ORDER — SUMATRIPTAN 50 MG/1
50 TABLET, FILM COATED ORAL
Qty: 10 TABLET | Refills: 1 | Status: SHIPPED | OUTPATIENT
Start: 2021-08-06

## 2021-08-06 ASSESSMENT — FIBROSIS 4 INDEX: FIB4 SCORE: 2.26

## 2021-08-06 NOTE — PROGRESS NOTES
Subjective:   Chief Complaint/History of Present Illness:  Malou Pretty is a 71 y.o. female established patient who presents today to discuss medical problems as listed below. Malou is unaccompanied for today's visit.    Problem   Femoral Hernia of Right Side    She reports insidious presentation of a lump in the right lower groin. Sometimes she notes it when standing. On our visit today it appears flat which she agrees. She has a history of a lower abdominal surgery around 9607-1356 for lysis of adhesions with a well healed vertical incision. She has also noted slow onset of diastasis recti inferior and on the left lateral side to the umbilicus. No issues with enlarged lymph nodes in the past.    Growing in size and more uncomfortable for the patient especially with forward flexion and bowel movements.       Lump in The Groin    She reports insidious presentation of a lump in the right lower groin. Sometimes she notes it when standing. On our visit today it appears flat which she agrees. She has a history of a lower abdominal surgery around 6214-3042 for lysis of adhesions with a well healed vertical incision. She has also noted slow onset of diastasis recti inferior and on the left lateral side to the umbilicus. No issues with enlarged lymph nodes in the past.    Growing in size and more uncomfortable for the patient especially with forward flexion and bowel movements.     Primary Osteoarthritis Involving Multiple Joints    She reports challenges with joint pains in the right shoulder, bilateral hands, and right knee.  She is status post right shoulder arthroscopy and right total knee replacement.  She was previously utilizing meloxicam but this led to dyspepsia and she was transitioned onto Celebrex which she is utilizing every other day.  It does cause stomach irritation occasionally but she has no history of GI bleed and denies current melena, hematochezia, or abdominal pain.  At this point she has had the  "shoulder and the knee operated on but with her hands will still notice stiffness and swelling if she does not take the Celebrex.    Current regimen: Celebrex 200 mg daily and flexeril 10 mg prn (few times per month)     Migraines    This is to be more of a problem for her back when she was working in high stress job.  She reports only utilizing her sumatriptan once in the past year.  She will sometimes use Tylenol which is helpful.  She has no concerns regarding her history of migraines.          Current Medications:  Current Outpatient Medications Ordered in Epic   Medication Sig Dispense Refill   • baclofen (LIORESAL) 10 MG Tab Take 1 tablet by mouth 3 times a day. 90 tablet 1   • SUMAtriptan (IMITREX) 50 MG Tab Take 1 tablet by mouth one time as needed. 10 tablet 1   • famotidine (PEPCID) 20 MG Tab Take 20 mg by mouth 1 time a day as needed for Heartburn.     • levothyroxine (SYNTHROID) 75 MCG Tab Take 1 tablet by mouth Every morning on an empty stomach. 90 tablet 3   • escitalopram (LEXAPRO) 20 MG tablet TAKE 1 TABLET BY MOUTH EVERY DAY 90 Tab 1   • estradiol (ESTRACE) 0.5 MG tablet TAKE 1/2 TABLET BY MOUTH DAILY 45 Tab 2   • albuterol 108 (90 Base) MCG/ACT Aero Soln inhalation aerosol Inhale 2 Puffs by mouth.     • Omega-3 Fatty Acids (FISH OIL) 1200 MG Cap Take  by mouth.     • fexofenadine (ALLEGRA) 30 MG tablet Take 30 mg by mouth.     • Calcium Carb-Cholecalciferol (CALCIUM 1000 + D PO) Take 1 Cap by mouth 1 time daily as needed.       No current Epic-ordered facility-administered medications on file.          Objective:   Physical Exam:    Vitals: /78 (BP Location: Left arm, Patient Position: Sitting, BP Cuff Size: Adult)   Pulse 68   Temp 36.7 °C (98.1 °F) (Temporal)   Resp 14   Ht 1.753 m (5' 9\")   Wt 64.7 kg (142 lb 9.6 oz)   SpO2 100%    BMI: Body mass index is 21.06 kg/m².  Physical Exam  Constitutional:       General: She is not in acute distress.     Appearance: Normal appearance. She is " normal weight. She is not ill-appearing.   Eyes:      Extraocular Movements: Extraocular movements intact.      Conjunctiva/sclera: Conjunctivae normal.   Pulmonary:      Effort: Pulmonary effort is normal. No respiratory distress.   Genitourinary:     Comments: Bulging in right groin with palpable defect, feels consistent with a femoral hernia. No signs of strangulation/obstruction.  Musculoskeletal:      Right lower leg: No edema.      Left lower leg: No edema.   Skin:     General: Skin is warm and dry.      Findings: No rash.   Neurological:      Gait: Gait normal.   Psychiatric:         Behavior: Behavior normal.         Thought Content: Thought content normal.         Judgment: Judgment normal.          Assessment and Plan:   Malou is a 71 y.o. female with the following:  Problem List Items Addressed This Visit     Primary osteoarthritis involving multiple joints     Chronic and ongoing problem.  Will provide refill for baclofen which she uses in combination with Celebrex for her arthritic pain.         Relevant Medications    baclofen (LIORESAL) 10 MG Tab    Migraines     Chronic and ongoing problem.  Will renew her sumatriptan as it has not been filled for some time, she had her first migraine in a long talk recently.  Sumatriptan corrected the problem quickly.         Relevant Medications    baclofen (LIORESAL) 10 MG Tab    SUMAtriptan (IMITREX) 50 MG Tab    Lump in the groin     Chronic and worsening problem, feels consistent with femoral hernia on exam but will refer her to general surgery for additional evaluation and to discuss surgical options if indicated.  She was appreciative of the visit.         Relevant Orders    REFERRAL TO GENERAL SURGERY    Femoral hernia of right side     New problem, requires additional evaluation.  Will refer to general surgery as a defect feels palpable at this point and is causing her a lot of discomfort.  She would be open to surgical repair if indicated.         Relevant  Orders    REFERRAL TO GENERAL SURGERY             RTC: Return if symptoms worsen or fail to improve.    I spent a total of 28 minutes with record review, exam, communication with the patient, communication with other providers, and documentation of this encounter.    PLEASE NOTE: This dictation was created using voice recognition software. I have made every reasonable attempt to correct obvious errors, but I expect that there are errors of grammar and possibly content that I did not discover before finalizing the note.      Melba Hamilton, DO  Geriatric and Internal Medicine  [facility]

## 2021-08-06 NOTE — ASSESSMENT & PLAN NOTE
Chronic and ongoing problem.  Will provide refill for baclofen which she uses in combination with Celebrex for her arthritic pain.

## 2021-08-06 NOTE — ASSESSMENT & PLAN NOTE
Chronic and worsening problem, feels consistent with femoral hernia on exam but will refer her to general surgery for additional evaluation and to discuss surgical options if indicated.  She was appreciative of the visit.

## 2021-08-06 NOTE — PATIENT COMMUNICATION
"Called and patient reports right groin protrusion that measures 3\" across and protrudes 1/4 - 1/2\". Worsens upon sitting, position of comfort when lying down. Moving from bending to standing position is very painful and is accompanied by heavy nausea. Denies blood in stool or abnormal stool consistency, shape or size. Denies fever. Informed PCP. Made appointment for today.  "

## 2021-08-06 NOTE — TELEPHONE ENCOUNTER
"----- Message from Malou Pretty sent at 8/6/2021 12:12 PM PDT -----  Regarding: Non-Urgent Medical Question  Contact: [phone]  I think that the \"lump\" in my groin may be a hernia.  It is deepening in color and firming up in texture.  Bending or sitting that impinges in the area is moderately painful.  This last week, bowel movements have been literally exhausting and periodically  slightly nauseating.  Due to bowel blockage (likely from scar tissue buildup 20 years after oophorectomy damage to bowel)  abdominal surgery 4 years ago, I may be a bit sensitive, but would like to have it checked out.  Do I wait to see you until our scheduled appointment on Aug. 24?  thank you,  Malou"

## 2021-08-06 NOTE — ASSESSMENT & PLAN NOTE
Chronic and ongoing problem.  Will renew her sumatriptan as it has not been filled for some time, she had her first migraine in a long talk recently.  Sumatriptan corrected the problem quickly.

## 2021-08-06 NOTE — ASSESSMENT & PLAN NOTE
New problem, requires additional evaluation.  Will refer to general surgery as a defect feels palpable at this point and is causing her a lot of discomfort.  She would be open to surgical repair if indicated.

## 2021-08-16 ENCOUNTER — TELEPHONE (OUTPATIENT)
Dept: MEDICAL GROUP | Facility: PHYSICIAN GROUP | Age: 71
End: 2021-08-16

## 2021-08-16 DIAGNOSIS — K41.90 FEMORAL HERNIA OF RIGHT SIDE: ICD-10-CM

## 2021-08-16 NOTE — TELEPHONE ENCOUNTER
1. Caller Name: Malou Pretty                          Call Back Number: [phone] (home) [phone] (work)        How would the patient prefer to be contacted with a response: Phone call OK to leave a detailed message    Malou called on VM last Friday to ask if we can send the referral to [facility] as they said they never received it.  I faxed over today

## 2021-08-24 ENCOUNTER — HOSPITAL ENCOUNTER (OUTPATIENT)
Facility: MEDICAL CENTER | Age: 71
End: 2021-08-24
Attending: INTERNAL MEDICINE
Payer: MEDICARE

## 2021-08-24 ENCOUNTER — OFFICE VISIT (OUTPATIENT)
Dept: MEDICAL GROUP | Facility: PHYSICIAN GROUP | Age: 71
End: 2021-08-24
Payer: MEDICARE

## 2021-08-24 VITALS
HEART RATE: 68 BPM | SYSTOLIC BLOOD PRESSURE: 142 MMHG | OXYGEN SATURATION: 98 % | WEIGHT: 142.7 LBS | TEMPERATURE: 98.1 F | BODY MASS INDEX: 21.14 KG/M2 | HEIGHT: 69 IN | DIASTOLIC BLOOD PRESSURE: 62 MMHG

## 2021-08-24 DIAGNOSIS — R31.21 ASYMPTOMATIC MICROSCOPIC HEMATURIA: ICD-10-CM

## 2021-08-24 DIAGNOSIS — K41.90 FEMORAL HERNIA OF RIGHT SIDE: ICD-10-CM

## 2021-08-24 DIAGNOSIS — N18.31 STAGE 3A CHRONIC KIDNEY DISEASE: ICD-10-CM

## 2021-08-24 DIAGNOSIS — E03.8 OTHER SPECIFIED HYPOTHYROIDISM: ICD-10-CM

## 2021-08-24 DIAGNOSIS — G89.29 CHRONIC PAIN OF LEFT KNEE: ICD-10-CM

## 2021-08-24 DIAGNOSIS — E53.8 B12 DEFICIENCY: ICD-10-CM

## 2021-08-24 DIAGNOSIS — M25.562 CHRONIC PAIN OF LEFT KNEE: ICD-10-CM

## 2021-08-24 PROBLEM — N18.30 CHRONIC KIDNEY DISEASE, STAGE 3: Status: ACTIVE | Noted: 2021-08-24

## 2021-08-24 LAB
APPEARANCE UR: CLEAR
BILIRUB UR QL STRIP.AUTO: NEGATIVE
COLOR UR: YELLOW
GLUCOSE UR STRIP.AUTO-MCNC: NEGATIVE MG/DL
KETONES UR STRIP.AUTO-MCNC: NEGATIVE MG/DL
LEUKOCYTE ESTERASE UR QL STRIP.AUTO: NEGATIVE
MICRO URNS: NORMAL
NITRITE UR QL STRIP.AUTO: NEGATIVE
PH UR STRIP.AUTO: 7 [PH] (ref 5–8)
PROT UR QL STRIP: NEGATIVE MG/DL
RBC UR QL AUTO: NEGATIVE
SP GR UR STRIP.AUTO: 1.01
UROBILINOGEN UR STRIP.AUTO-MCNC: 0.2 MG/DL

## 2021-08-24 PROCEDURE — 84443 ASSAY THYROID STIM HORMONE: CPT

## 2021-08-24 PROCEDURE — 81003 URINALYSIS AUTO W/O SCOPE: CPT

## 2021-08-24 PROCEDURE — 85025 COMPLETE CBC W/AUTO DIFF WBC: CPT

## 2021-08-24 PROCEDURE — 80053 COMPREHEN METABOLIC PANEL: CPT

## 2021-08-24 PROCEDURE — 36415 COLL VENOUS BLD VENIPUNCTURE: CPT | Performed by: INTERNAL MEDICINE

## 2021-08-24 PROCEDURE — 82607 VITAMIN B-12: CPT

## 2021-08-24 PROCEDURE — 99214 OFFICE O/P EST MOD 30 MIN: CPT | Performed by: INTERNAL MEDICINE

## 2021-08-24 ASSESSMENT — FIBROSIS 4 INDEX: FIB4 SCORE: 2.26

## 2021-08-24 NOTE — ASSESSMENT & PLAN NOTE
Chronic and ongoing problem.  She has met with Dr. Pacheco of [facility] and plans to have laparoscopic repair in the future.

## 2021-08-24 NOTE — ASSESSMENT & PLAN NOTE
Chronic and ongoing problem, tolerating with over the counter remedies at this time. Knows she can return to Dr. Condon at [facility] as needed for injections in the future.

## 2021-08-24 NOTE — ASSESSMENT & PLAN NOTE
New problem that requires additional evaluation.  Will recheck kidney function to see if this is persistent.  She is established with urology and is receiving work-up for microscopic hematuria.

## 2021-08-24 NOTE — PROGRESS NOTES
Subjective:   Chief Complaint/History of Present Illness:  Malou Pretty is a 71 y.o. female established patient who presents today to discuss medical problems as listed below. Malou is unaccompanied for today's visit.    Problem   Chronic Kidney Disease, Stage 3 (Hcc)       Ref. Range 6/28/2021 16:13   Bun Latest Ref Range: 8 - 22 mg/dL 22   Creatinine Latest Ref Range: 0.50 - 1.40 mg/dL 0.98   GFR If Non  Latest Ref Range: >60 mL/min/1.73 m 2 56 (A)     Mild reduction in GFR noted on last blood work in June 2021.  Currently receiving work-up for microscopic hematuria.  Imaging has been reassuring thus far.  No known hypertension or diabetes.  She has used NSAIDs for arthritis pain in the past.     Femoral Hernia of Right Side    She reports insidious presentation of a lump in the right lower groin. Sometimes she notes it when standing. On our visit today it appears flat which she agrees. She has a history of a lower abdominal surgery around [id number] for lysis of adhesions with a well healed vertical incision. She has also noted slow onset of diastasis recti inferior and on the left lateral side to the umbilicus. No issues with enlarged lymph nodes in the past.    Growing in size and more uncomfortable for the patient especially with forward flexion and bowel movements.    She saw Dr. Pacheco at [facility] and has been offered laparoscopic repair, no surgical date set.       Asymptomatic Microscopic Hematuria       Ref. Range 3/12/2021 17:39 3/12/2021 18:26 3/30/2021 06:50   Occult Blood Latest Ref Range: Negative  Moderate (A)  Small (A)     Renal US (4/27/21): Moderate right hydronephrosis with some tenderness experienced during transducer pressure. This is worrisome for a ureteral calculus favored over mass and unenhanced renal colic CT is recommended to further characterize    CT Renal Colic (4/29/21):   1.  There is no evidence of nephrolithiasis or urolithiasis.  2.  There is  retroperitoneal opacity which could be due to 2 lymphadenopathy versus unopacified loops of bowel or prominent vascular structures. Evaluation is limited on this noncontrast exam due to lack of intra-abdominal fat.  3.  There are slightly prominent bilateral extrarenal pelves but there is no overt hydronephrosis.    CT Pelvis Urogram (7/2021):  1.  No enhancing renal mass or kidney stone demonstrated.  2.  No ureteral stone or hydronephrosis.  3.  Multiple liver cysts, unchanged from prior.  No further evaluation necessary.      She has moderate occult blood on recent urinalysis in the ED.  She reports history of vaginal atrophy and did not use an alcohol swab prior to giving the sample.  No gross hematuria noted.  On follow-up she continued to have a trace amount of occult blood on her urinalysis. Urinalysis completed as above.    She admits to periodic use of aspirin 81 mg daily but none preceding the abnormal urinalysis.  No known history of nephritic or nephrotic syndrome.  She is not taking anti-inflammatories or other blood thinning medicines. She is established with urology and will see Dr. Silva in the near future for pyelogram.     Chronic Pain of Left Knee    She is saw Dr. Condon at [facility] initially in March 2021 and received an injection with good results.  She follow-up in May 2021 and he reported she could continue to see him on an as-needed basis for injections.  No surgical indications at this time.     Hypothyroidism       Ref. Range 5/15/2019 06:35   TSH Latest Ref Range: 0.380 - 5.330 uIU/mL 2.810       She has maintained on levothyroxine with stable thyroid numbers, no recent dose adjustment.  No tremor, changes in hair or nails, or palpitations.    Current regimen: Levothyroxine 75 mcg daily            Current Medications:  Current Outpatient Medications Ordered in Epic   Medication Sig Dispense Refill   • Acetaminophen (TYLENOL PO) Take  by mouth. 650mg     • baclofen (LIORESAL) 10 MG Tab Take 1  "tablet by mouth 3 times a day. (Patient taking differently: Take 10 mg by mouth 3 times a day. Every 2nd or third night) 90 tablet 1   • SUMAtriptan (IMITREX) 50 MG Tab Take 1 tablet by mouth one time as needed. 10 tablet 1   • famotidine (PEPCID) 20 MG Tab Take 20 mg by mouth 1 time a day as needed for Heartburn.     • levothyroxine (SYNTHROID) 75 MCG Tab Take 1 tablet by mouth Every morning on an empty stomach. 90 tablet 3   • escitalopram (LEXAPRO) 20 MG tablet TAKE 1 TABLET BY MOUTH EVERY DAY 90 Tab 1   • estradiol (ESTRACE) 0.5 MG tablet TAKE 1/2 TABLET BY MOUTH DAILY 45 Tab 2   • albuterol 108 (90 Base) MCG/ACT Aero Soln inhalation aerosol Inhale 2 Puffs by mouth.     • Omega-3 Fatty Acids (FISH OIL) 1200 MG Cap Take  by mouth.     • fexofenadine (ALLEGRA) 30 MG tablet Take 30 mg by mouth.     • Calcium Carb-Cholecalciferol (CALCIUM 1000 + D PO) Take 1 Cap by mouth 1 time daily as needed.       No current Epic-ordered facility-administered medications on file.          Objective:   Physical Exam:    Vitals: /62 (BP Location: Right arm, Patient Position: Sitting, BP Cuff Size: Adult)   Pulse 68   Temp 36.7 °C (98.1 °F) (Temporal)   Ht 1.753 m (5' 9\")   Wt 64.7 kg (142 lb 11.2 oz)   SpO2 98%    BMI: Body mass index is 21.07 kg/m².  Physical Exam  Constitutional:       General: She is not in acute distress.     Appearance: Normal appearance. She is normal weight. She is not ill-appearing.   HENT:      Right Ear: There is impacted cerumen.      Left Ear: Tympanic membrane and ear canal normal. There is no impacted cerumen.   Eyes:      Extraocular Movements: Extraocular movements intact.      Conjunctiva/sclera: Conjunctivae normal.   Cardiovascular:      Rate and Rhythm: Normal rate and regular rhythm.      Pulses: Normal pulses.      Heart sounds: No murmur heard.     Pulmonary:      Effort: Pulmonary effort is normal. No respiratory distress.      Breath sounds: Normal breath sounds. No wheezing or " rhonchi.   Abdominal:      General: Bowel sounds are normal.      Palpations: Abdomen is soft.   Musculoskeletal:      Right lower leg: No edema.      Left lower leg: No edema.   Skin:     General: Skin is warm and dry.      Findings: No rash.   Psychiatric:         Mood and Affect: Mood normal.         Behavior: Behavior normal.         Thought Content: Thought content normal.         Judgment: Judgment normal.         Assessment and Plan:   Malou is a 71 y.o. female with the following:  Problem List Items Addressed This Visit     Hypothyroidism     Chronic and ongoing problem, requires follow-up.  Will update thyroid levels to ensure stability as she has several upcoming procedures planned.  Continue levothyroxine 75 mcg daily in the meantime.         Relevant Orders    CBC WITH DIFFERENTIAL    Comp Metabolic Panel    TSH WITH REFLEX TO FT4    Chronic pain of left knee     Chronic and ongoing problem, tolerating with over the counter remedies at this time. Knows she can return to Dr. Condon at [facility] as needed for injections in the future.         Relevant Medications    Acetaminophen (TYLENOL PO)    Asymptomatic microscopic hematuria     Chronic and ongoing problem.  She is established with urology.  We will recheck urinalysis at that time to see if hematuria is persisting.  She will see Dr. Silva for pyelogram in the next week.  Still not taking any antiplatelets or anticoagulants, she is currently holding her anti-inflammatories.         Relevant Orders    URINALYSIS,CULTURE IF INDICATED    Femoral hernia of right side     Chronic and ongoing problem.  She has met with Dr. Pacheco of [facility] and plans to have laparoscopic repair in the future.         Chronic kidney disease, stage 3 (HCC)     New problem that requires additional evaluation.  Will recheck kidney function to see if this is persistent.  She is established with urology and is receiving work-up for microscopic hematuria.           Other Visit  Diagnoses     B12 deficiency        Relevant Orders    VITAMIN B12           RTC: Return in about 3 months (around 11/24/2021).    I spent a total of 35 minutes with record review, exam, communication with the patient, communication with other providers, and documentation of this encounter.    PLEASE NOTE: This dictation was created using voice recognition software. I have made every reasonable attempt to correct obvious errors, but I expect that there are errors of grammar and possibly content that I did not discover before finalizing the note.      Melba Hamilton, DO  Geriatric and Internal Medicine  [facility]

## 2021-08-24 NOTE — PROGRESS NOTES
Patient arrived for blood draw.   Verified patient's name/date-of-birth and reason for visit before procedure was started. Patient's left antecubital  cleansed. Venipuncture attempts X1.Blood draw completed on patient's left AC. Applied pressure afterwards and placed Coban on site of venipuncture with directions for patient to remove within the next hour. Patient tolerated procedure well, no adverse reactions. Patient ambulated safely upon leaving clinic.   Completed labels were placed on blood samples in draw room with patient present. Appropriate blood samples were centrifuged. Blood samples were then placed in locked lab box for  pick-up.

## 2021-08-24 NOTE — ASSESSMENT & PLAN NOTE
Chronic and ongoing problem, requires follow-up.  Will update thyroid levels to ensure stability as she has several upcoming procedures planned.  Continue levothyroxine 75 mcg daily in the meantime.

## 2021-08-24 NOTE — ASSESSMENT & PLAN NOTE
Chronic and ongoing problem.  She is established with urology.  We will recheck urinalysis at that time to see if hematuria is persisting.  She will see Dr. Silva for pyelogram in the next week.  Still not taking any antiplatelets or anticoagulants, she is currently holding her anti-inflammatories.

## 2021-08-25 LAB
ALBUMIN SERPL BCP-MCNC: 4.2 G/DL (ref 3.2–4.9)
ALBUMIN/GLOB SERPL: 1.6 G/DL
ALP SERPL-CCNC: 23 U/L (ref 30–99)
ALT SERPL-CCNC: 26 U/L (ref 2–50)
ANION GAP SERPL CALC-SCNC: 11 MMOL/L (ref 7–16)
AST SERPL-CCNC: 24 U/L (ref 12–45)
BASOPHILS # BLD AUTO: 0.9 % (ref 0–1.8)
BASOPHILS # BLD: 0.07 K/UL (ref 0–0.12)
BILIRUB SERPL-MCNC: 0.2 MG/DL (ref 0.1–1.5)
BUN SERPL-MCNC: 20 MG/DL (ref 8–22)
CALCIUM SERPL-MCNC: 8.9 MG/DL (ref 8.5–10.5)
CHLORIDE SERPL-SCNC: 94 MMOL/L (ref 96–112)
CO2 SERPL-SCNC: 26 MMOL/L (ref 20–33)
CREAT SERPL-MCNC: 0.83 MG/DL (ref 0.5–1.4)
EOSINOPHIL # BLD AUTO: 0.11 K/UL (ref 0–0.51)
EOSINOPHIL NFR BLD: 1.4 % (ref 0–6.9)
ERYTHROCYTE [DISTWIDTH] IN BLOOD BY AUTOMATED COUNT: 48.5 FL (ref 35.9–50)
GLOBULIN SER CALC-MCNC: 2.6 G/DL (ref 1.9–3.5)
GLUCOSE SERPL-MCNC: 88 MG/DL (ref 65–99)
HCT VFR BLD AUTO: 42.2 % (ref 37–47)
HGB BLD-MCNC: 14.4 G/DL (ref 12–16)
IMM GRANULOCYTES # BLD AUTO: 0.02 K/UL (ref 0–0.11)
IMM GRANULOCYTES NFR BLD AUTO: 0.3 % (ref 0–0.9)
LYMPHOCYTES # BLD AUTO: 2.53 K/UL (ref 1–4.8)
LYMPHOCYTES NFR BLD: 31.6 % (ref 22–41)
MCH RBC QN AUTO: 31.5 PG (ref 27–33)
MCHC RBC AUTO-ENTMCNC: 34.1 G/DL (ref 33.6–35)
MCV RBC AUTO: 92.3 FL (ref 81.4–97.8)
MONOCYTES # BLD AUTO: 0.74 K/UL (ref 0–0.85)
MONOCYTES NFR BLD AUTO: 9.3 % (ref 0–13.4)
NEUTROPHILS # BLD AUTO: 4.53 K/UL (ref 2–7.15)
NEUTROPHILS NFR BLD: 56.5 % (ref 44–72)
NRBC # BLD AUTO: 0 K/UL
NRBC BLD-RTO: 0 /100 WBC
PLATELET # BLD AUTO: 177 K/UL (ref 164–446)
PMV BLD AUTO: 10.2 FL (ref 9–12.9)
POTASSIUM SERPL-SCNC: 4.4 MMOL/L (ref 3.6–5.5)
PROT SERPL-MCNC: 6.8 G/DL (ref 6–8.2)
RBC # BLD AUTO: 4.57 M/UL (ref 4.2–5.4)
SODIUM SERPL-SCNC: 131 MMOL/L (ref 135–145)
TSH SERPL DL<=0.005 MIU/L-ACNC: 1.56 UIU/ML (ref 0.38–5.33)
VIT B12 SERPL-MCNC: 1021 PG/ML (ref 211–911)
WBC # BLD AUTO: 8 K/UL (ref 4.8–10.8)

## 2021-08-27 DIAGNOSIS — Z78.0 POST-MENOPAUSAL: ICD-10-CM

## 2021-08-27 RX ORDER — ESTRADIOL 0.5 MG/1
TABLET ORAL
Qty: 100 TABLET | Refills: 0 | Status: SHIPPED | OUTPATIENT
Start: 2021-08-27 | End: 2021-11-23 | Stop reason: SDUPTHER

## 2021-09-17 ENCOUNTER — OFFICE VISIT (OUTPATIENT)
Dept: MEDICAL GROUP | Facility: PHYSICIAN GROUP | Age: 71
End: 2021-09-17
Payer: MEDICARE

## 2021-09-17 ENCOUNTER — HOSPITAL ENCOUNTER (OUTPATIENT)
Facility: MEDICAL CENTER | Age: 71
End: 2021-09-17
Attending: FAMILY MEDICINE
Payer: MEDICARE

## 2021-09-17 VITALS
OXYGEN SATURATION: 94 % | TEMPERATURE: 98.8 F | HEIGHT: 69 IN | DIASTOLIC BLOOD PRESSURE: 80 MMHG | SYSTOLIC BLOOD PRESSURE: 120 MMHG | WEIGHT: 139 LBS | BODY MASS INDEX: 20.59 KG/M2 | HEART RATE: 72 BPM | RESPIRATION RATE: 15 BRPM

## 2021-09-17 DIAGNOSIS — U07.1 COVID-19 VIRUS DETECTED: ICD-10-CM

## 2021-09-17 DIAGNOSIS — Z20.828 EXPOSURE TO SARS-ASSOCIATED CORONAVIRUS: ICD-10-CM

## 2021-09-17 PROCEDURE — 99214 OFFICE O/P EST MOD 30 MIN: CPT | Mod: CS | Performed by: FAMILY MEDICINE

## 2021-09-17 PROCEDURE — U0005 INFEC AGEN DETEC AMPLI PROBE: HCPCS

## 2021-09-17 PROCEDURE — U0003 INFECTIOUS AGENT DETECTION BY NUCLEIC ACID (DNA OR RNA); SEVERE ACUTE RESPIRATORY SYNDROME CORONAVIRUS 2 (SARS-COV-2) (CORONAVIRUS DISEASE [COVID-19]), AMPLIFIED PROBE TECHNIQUE, MAKING USE OF HIGH THROUGHPUT TECHNOLOGIES AS DESCRIBED BY CMS-2020-01-R: HCPCS

## 2021-09-17 RX ORDER — CYCLOBENZAPRINE HCL 10 MG
TABLET ORAL
COMMUNITY
End: 2021-09-17

## 2021-09-17 RX ORDER — CELECOXIB 200 MG/1
CAPSULE ORAL
COMMUNITY
End: 2021-09-17

## 2021-09-17 RX ORDER — GABAPENTIN 100 MG/1
CAPSULE ORAL
COMMUNITY

## 2021-09-17 ASSESSMENT — ENCOUNTER SYMPTOMS
DIZZINESS: 1
COUGH: 1
MYALGIAS: 1
FEVER: 0
NAUSEA: 0
DIAPHORESIS: 1
PALPITATIONS: 0
WHEEZING: 0
ABDOMINAL PAIN: 0
CHILLS: 0
DIARRHEA: 1
HEADACHES: 1
VOMITING: 0
SHORTNESS OF BREATH: 1

## 2021-09-17 ASSESSMENT — FIBROSIS 4 INDEX: FIB4 SCORE: 1.94

## 2021-09-17 NOTE — PROGRESS NOTES
"Subjective:     CC:   Chief Complaint   Patient presents with   • Coronavirus Screening     Positive at home test 9/16/21. Bad dry cough 9/16/21. Congestion last week. Sore throat and SOB        HPI:   Malou presents today with symptoms of COVID-19 after positive home test x1 day. Malou Pretty is interested in referral to the infusion center to complete monoclonal antibody infusion with casirivimab and imdevimab. With regard to the injections of casirivimab and imdevimab I have provided Malou Pretty with the \"fact sheet for patients and parents/caregivers \".  I informed her of therapeutic alternatives tocasirivimab and imdevimab and the risk and benefits of those alternatives.  I informed her that that she has the option to accept or refuse casirivimab and imdevimab.  I informed her thatcasirivimab and imdevimab is not FDA approved, but that is authorized for use under emergency by the FDA.  I informed her of the known risk and benefits of casirivimab and imdevimab and discussed the extent to which such risks and benefits are unknown. She gives me consent for infusion.     Discussed re-scheduling her pre-op appointment for her hernia surgery, as she will not be outside her 10 day quarantine window at that time.       Exposure to SARS-associated coronavirus  Patient presents to clinic today after taking an at home COVID test which showed as a positive on 9/16/21. She was vaccinated for COVID in February as well as her . She lives at home with her  who has had recent surgeries but is not experiencing symptoms. Denies being around anyone else that has been sick, does quarantine for the most part but did have lunch with someone Monday who was not vaccinated and goes to [employer] on Sundays. Wears mask in the community. Her symptoms are cough, congestion, runny nose, fatigue, diaphoresis, shortness of breath, diarrhea, myalgias, dizziness, and headache. She denies oxygen dropping below 90% at home, " she has pulse oximetry at home and is maintaining around 97%. Is taking Mucinex OTC and albuterol PRN which seems to help.  Discussed increasing water intake, using incentive spirometry that was given to her today in clinic, staying mobile and active, and laying on stomach when sleeping.         Current Outpatient Medications Ordered in Epic   Medication Sig Dispense Refill   • gabapentin (NEURONTIN) 100 MG Cap gabapentin 100 mg capsule   TAKE 3 CAPSULES BY MOUTH 3 TIMES A DAY     • albuterol 108 (90 Base) MCG/ACT Aero Soln inhalation aerosol Inhale 2 Puffs every 6 hours as needed for Shortness of Breath. 8.5 g 1   • estradiol (ESTRACE) 0.5 MG tablet TAKE 1/2 TABLET BY MOUTH DAILY 100 Tablet 0   • Acetaminophen (TYLENOL PO) Take  by mouth. 650mg     • baclofen (LIORESAL) 10 MG Tab Take 1 tablet by mouth 3 times a day. (Patient taking differently: Take 10 mg by mouth 3 times a day. Every 2nd or third night) 90 tablet 1   • SUMAtriptan (IMITREX) 50 MG Tab Take 1 tablet by mouth one time as needed. 10 tablet 1   • famotidine (PEPCID) 20 MG Tab Take 20 mg by mouth 1 time a day as needed for Heartburn.     • levothyroxine (SYNTHROID) 75 MCG Tab Take 1 tablet by mouth Every morning on an empty stomach. 90 tablet 3   • escitalopram (LEXAPRO) 20 MG tablet TAKE 1 TABLET BY MOUTH EVERY DAY 90 Tab 1   • fexofenadine (ALLEGRA) 30 MG tablet Take 30 mg by mouth.     • Calcium Carb-Cholecalciferol (CALCIUM 1000 + D PO) Take 1 Cap by mouth 1 time daily as needed.     • Omega-3 Fatty Acids (FISH OIL) 1200 MG Cap Take  by mouth. (Patient not taking: Reported on 9/17/2021)       No current [facility]-ordered facility-administered medications on file.       Review of Systems   Constitutional: Positive for diaphoresis and malaise/fatigue. Negative for chills and fever.   Respiratory: Positive for cough and shortness of breath. Negative for wheezing.    Cardiovascular: Negative for chest pain, palpitations and leg swelling.   Gastrointestinal:  "Positive for diarrhea. Negative for abdominal pain, nausea and vomiting.        Diarrhea that comes and goes   Genitourinary: Negative.    Musculoskeletal: Positive for myalgias.   Skin: Negative for rash.   Neurological: Positive for dizziness and headaches.         Objective:     Exam:  /80 (BP Location: Left arm, Patient Position: Sitting, BP Cuff Size: Adult)   Pulse 72   Temp 37.1 °C (98.8 °F) (Temporal)   Resp 15   Ht 1.753 m (5' 9\")   Wt 63 kg (139 lb)   SpO2 94%   BMI 20.53 kg/m²  Body mass index is 20.53 kg/m².    Physical Exam  Vitals reviewed.   Constitutional:       Appearance: Normal appearance.   HENT:      Nose: Congestion and rhinorrhea present.      Mouth/Throat:      Mouth: Mucous membranes are moist.      Pharynx: Oropharynx is clear. No oropharyngeal exudate or posterior oropharyngeal erythema.   Eyes:      Conjunctiva/sclera: Conjunctivae normal.      Pupils: Pupils are equal, round, and reactive to light.   Cardiovascular:      Rate and Rhythm: Normal rate and regular rhythm.      Pulses: Normal pulses.      Heart sounds: Normal heart sounds. No murmur heard.     Pulmonary:      Effort: Pulmonary effort is normal. No respiratory distress.      Breath sounds: Normal breath sounds. No stridor. No wheezing, rhonchi or rales.      Comments: Posterior bases of lungs are diminished on exam  Chest:      Chest wall: No tenderness.   Abdominal:      General: Abdomen is flat.   Skin:     General: Skin is warm and dry.      Coloration: Skin is not jaundiced or pale.   Neurological:      Mental Status: She is alert and oriented to person, place, and time.   Psychiatric:         Mood and Affect: Mood normal.         Behavior: Behavior normal.          A chaperone was offered to the patient during today's exam. Patient declined chaperone.    Assessment & Plan:     71 y.o. female with the following -     1. Exposure to SARS-associated coronavirus  New, acute issue x 1 day. Positive at home test, " reswabbed in clinic today. Continues to use albuterol PRN for SOB and Mucinex OTC. Pt is agreeable and asking to get Regen-Cov infusion, orders sent to [facility] infusion center. Discussed hydration, incentive spirometry instructions, staying active, quarantine and ED precautions. Pt verbalized understanding.  - SARS-CoV-2 PCR (24 hour In-House): Collect NP swab in VTM; Future    Other orders  - gabapentin (NEURONTIN) 100 MG Cap; gabapentin 100 mg capsule   TAKE 3 CAPSULES BY MOUTH 3 TIMES A DAY       I spent a total of 33 minutes with record review, exam, communication with the patient, communication with other providers, and documentation of this encounter.      Return if symptoms worsen or fail to improve, for RN phone call next week to f/u on symptoms.    Please note that this dictation was created using voice recognition software. I have made every reasonable attempt to correct obvious errors, but I expect that there are errors of grammar and possibly content that I did not discover before finalizing the note.

## 2021-09-17 NOTE — ASSESSMENT & PLAN NOTE
Patient presents to clinic today after taking an at home COVID test which showed as a positive on 9/16/21. She was vaccinated for COVID in February as well as her . She lives at home with her  who has had recent surgeries but is not experiencing symptoms. Denies being around anyone else that has been sick, does quarantine for the most part but did have lunch with someone Monday who was not vaccinated and goes to [employer] on Sundays. Wears mask in the community. Her symptoms are cough, congestion, runny nose, fatigue, diaphoresis, shortness of breath, diarrhea, myalgias, dizziness, and headache. She denies oxygen dropping below 90% at home, she has pulse oximetry at home and is maintaining around 97%. Is taking Mucinex OTC and albuterol PRN which seems to help.  Discussed increasing water intake, using incentive spirometry that was given to her today in clinic, staying mobile and active, and laying on stomach when sleeping.

## 2021-09-18 DIAGNOSIS — Z20.828 EXPOSURE TO SARS-ASSOCIATED CORONAVIRUS: ICD-10-CM

## 2021-09-18 LAB — COVID ORDER STATUS COVID19: NORMAL

## 2021-09-19 LAB
SARS-COV-2 RNA RESP QL NAA+PROBE: NOTDETECTED
SPECIMEN SOURCE: NORMAL

## 2021-11-05 ENCOUNTER — TELEPHONE (OUTPATIENT)
Dept: MEDICAL GROUP | Facility: PHYSICIAN GROUP | Age: 71
End: 2021-11-05

## 2021-11-05 NOTE — TELEPHONE ENCOUNTER
"----- Message from Melba Hamilton D.O. sent at 11/4/2021  5:59 PM PDT -----  Regarding: FW: thumping in right ear post-hernia surgery, increased tinnitus      ----- Message -----  From: Maren Ramirez, Med Ass't  Sent: 11/4/2021   8:47 AM PDT  To: Melba Hamilton D.O.  Subject: FW: thumping in right ear post-hernia surger#      ----- Message -----  From: Malou Ryan Pretty  Sent: 11/3/2021  11:18 PM PDT  To: [facility]  Subject: thumping in right ear post-hernia surgery, i#    I'm experiencing a number of strange symptoms post hernia surgery on Sept. 30.  Anesthesia seemed a bit strong, and hospital \"recovery\" very short before returning home, all patched up.   One of the most unnerving is infrequent temporary tinnitis becoming  a infrequent daily pounding in my ears, mostly right side.  The tinnitus seemed to be exacerbated by a 5-day trial of Myrbetriq [facility]  So I reported that I had discontinued the Myrbetriq as a result.    For a about 4 days it calmed down, but has returned  I'm becoming concerned that something else may be happening.    Yusuf Catherine at [facility] has prescribed another medication, Toviaz (fesoterodine)to try for overactive bladder (trace blood in urine, but nothing to pinpoint any worrisome source).   Just FYI, as I [facility] (Dr. Pacheco was super attentive to loosening abdominal adhesions during the laparoscopic   hernia repair).  Wondering about whether stress /anxieties might also have an influence the right ear (and head) throbbing?  And/or whether I need another lavage?  It's been very distracting.  My concentration post surgery has not recovered yet.  Perhaps there's more to that too.   Or should I be more patient in recovery?  Thank you for any insight you might have, or to recommend that I just keep walking (my favorite anti-anxiety " strategy).  Malou Pretty

## 2021-11-05 NOTE — TELEPHONE ENCOUNTER
Called patient and relayed information per PCP request. Made appt with MICHAEL Holden for 11/9/21. Advised to go to ED if BP increases and is sustained or if symptoms worsen

## 2021-11-09 ENCOUNTER — OFFICE VISIT (OUTPATIENT)
Dept: MEDICAL GROUP | Facility: PHYSICIAN GROUP | Age: 71
End: 2021-11-09
Payer: MEDICARE

## 2021-11-09 ENCOUNTER — HOSPITAL ENCOUNTER (OUTPATIENT)
Facility: MEDICAL CENTER | Age: 71
End: 2021-11-09
Attending: FAMILY MEDICINE
Payer: MEDICARE

## 2021-11-09 VITALS
TEMPERATURE: 98.3 F | OXYGEN SATURATION: 97 % | WEIGHT: 144 LBS | DIASTOLIC BLOOD PRESSURE: 88 MMHG | HEIGHT: 69 IN | SYSTOLIC BLOOD PRESSURE: 142 MMHG | BODY MASS INDEX: 21.33 KG/M2 | HEART RATE: 79 BPM

## 2021-11-09 DIAGNOSIS — F41.9 ANXIETY AND DEPRESSION: ICD-10-CM

## 2021-11-09 DIAGNOSIS — Z91.09 ENVIRONMENTAL ALLERGIES: ICD-10-CM

## 2021-11-09 DIAGNOSIS — E03.8 OTHER SPECIFIED HYPOTHYROIDISM: ICD-10-CM

## 2021-11-09 DIAGNOSIS — F32.A ANXIETY AND DEPRESSION: ICD-10-CM

## 2021-11-09 LAB — TSH SERPL DL<=0.005 MIU/L-ACNC: 2.06 UIU/ML (ref 0.38–5.33)

## 2021-11-09 PROCEDURE — 36415 COLL VENOUS BLD VENIPUNCTURE: CPT | Performed by: FAMILY MEDICINE

## 2021-11-09 PROCEDURE — 99215 OFFICE O/P EST HI 40 MIN: CPT | Performed by: FAMILY MEDICINE

## 2021-11-09 PROCEDURE — 84443 ASSAY THYROID STIM HORMONE: CPT

## 2021-11-09 ASSESSMENT — ENCOUNTER SYMPTOMS
BLOOD IN STOOL: 1
CHILLS: 0
FEVER: 0
NERVOUS/ANXIOUS: 1
EYES NEGATIVE: 1
DIZZINESS: 0
NAUSEA: 0
PALPITATIONS: 0
COUGH: 0
ABDOMINAL PAIN: 0
HEARTBURN: 0
HEADACHES: 0
VOMITING: 0
ORTHOPNEA: 0
CONSTIPATION: 1
SHORTNESS OF BREATH: 0
WEIGHT LOSS: 0
MUSCULOSKELETAL NEGATIVE: 1

## 2021-11-09 ASSESSMENT — FIBROSIS 4 INDEX: FIB4 SCORE: 1.94

## 2021-11-09 NOTE — PROGRESS NOTES
"Subjective:     CC:   Chief Complaint   Patient presents with   • Ear Pain     started about two weeks ago       HPI:   Malou presents today with right ear pressure/thumping and increased tinnitus.  States she had some blood in water after BM that was pink, she states she does have hemorrhoids, this occurred 1 week ago. Discussed increasing Miralax if needed.     Hypothyroidism  Chronic, she recently cut her Levothyroxine dose in half to 37.5 mg daily. She endorses she feels more anxious, constipation, feeling fatigued. She denies palpitations or tremors. Will check TSH w T4 today in clinic and discuss correct dosing once lab values are back   Ref Range & Units 2 mo ago   TSH 0.380 - 5.330 uIU/mL 1.560     .     Anxiety and depression  Chronic, ongoing. She has cut back on her Lexapro to 10 mg most days, she took 20 mg two days this last week. She feels increased stress from husbands medical problems and her own small health concerns she has. Her sleep has improved with myrbetriq but also politics are stressful for her along with social interaction. 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 She also plans to get outside more which improves her mood as well. Denies SI/HI. Discussed going back to Lexapro 20 mg daily if her moods do not improve.      Environmental allergies  Chronic. She endorses she is allergic to \"everything\" outside, mostly kim brush. She was previously taking Flonase and allegra, she is currently not taking allegra or the Flonase. Mild effusion behind R TM. She has increased ear pressure/tinnitus and rhinorrhea. Discussed starting Flonase back to 1-2 sprays in each nostril daily, allegra if needed. Pt agreeable with plan.       Current Outpatient Medications Ordered in Epic   Medication Sig Dispense Refill   • gabapentin (NEURONTIN) 100 MG Cap gabapentin 100 mg capsule   TAKE 3 " CAPSULES BY MOUTH 3 TIMES A DAY     • albuterol 108 (90 Base) MCG/ACT Aero Soln inhalation aerosol Inhale 2 Puffs every 6 hours as needed for Shortness of Breath. 8.5 g 1   • estradiol (ESTRACE) 0.5 MG tablet TAKE 1/2 TABLET BY MOUTH DAILY (Patient taking differently: 0.25 mg. TAKE 1/2 TABLET BY MOUTH DAILY) 100 Tablet 0   • Acetaminophen (TYLENOL PO) Take  by mouth. 650mg     • baclofen (LIORESAL) 10 MG Tab Take 1 tablet by mouth 3 times a day. (Patient taking differently: Take 5 mg by mouth 2 times a day. Every 2nd or third night) 90 tablet 1   • SUMAtriptan (IMITREX) 50 MG Tab Take 1 tablet by mouth one time as needed. 10 tablet 1   • famotidine (PEPCID) 20 MG Tab Take 20 mg by mouth 1 time a day as needed for Heartburn.     • levothyroxine (SYNTHROID) 75 MCG Tab Take 1 tablet by mouth Every morning on an empty stomach. (Patient taking differently: Take 37.5 mcg by mouth every morning on an empty stomach.) 90 tablet 3   • escitalopram (LEXAPRO) 20 MG tablet TAKE 1 TABLET BY MOUTH EVERY DAY (Patient taking differently: 10 mg.) 90 Tab 1   • Calcium Carb-Cholecalciferol (CALCIUM 1000 + D PO) Take 1 Cap by mouth 1 time daily as needed.     • Omega-3 Fatty Acids (FISH OIL) 1200 MG Cap Take  by mouth. (Patient not taking: Reported on 9/17/2021)     • fexofenadine (ALLEGRA) 30 MG tablet Take 30 mg by mouth. (Patient not taking: Reported on 11/9/2021)       No current [facility]-ordered facility-administered medications on file.       Health Maintenance: Completed    Review of Systems   Constitutional: Negative for chills, fever, malaise/fatigue and weight loss.   HENT: Positive for congestion, ear pain and tinnitus.    Eyes: Negative.    Respiratory: Negative for cough and shortness of breath.    Cardiovascular: Negative for chest pain, palpitations and orthopnea.   Gastrointestinal: Positive for blood in stool and constipation. Negative for abdominal pain, heartburn, nausea and vomiting.        Internal and external  "hemorrhoids.    Genitourinary: Negative.    Musculoskeletal: Negative.    Neurological: Negative for dizziness and headaches.   Psychiatric/Behavioral: The patient is nervous/anxious.          Objective:     Exam:  /88 (BP Location: Right arm, Patient Position: Sitting, BP Cuff Size: Adult)   Pulse 79   Temp 36.8 °C (98.3 °F) (Temporal)   Ht 1.753 m (5' 9\")   Wt 65.3 kg (144 lb)   SpO2 97%   BMI 21.27 kg/m²  Body mass index is 21.27 kg/m².    Physical Exam  Vitals reviewed.   Constitutional:       Appearance: Normal appearance.   HENT:      Right Ear: Hearing, ear canal and external ear normal. No tenderness. A middle ear effusion is present. Tympanic membrane is not perforated, erythematous or retracted.      Left Ear: Hearing, tympanic membrane, ear canal and external ear normal. No tenderness.  No middle ear effusion. Tympanic membrane is not perforated, erythematous or retracted.   Cardiovascular:      Rate and Rhythm: Normal rate and regular rhythm.      Pulses: Normal pulses.      Heart sounds: Normal heart sounds. No murmur heard.      Pulmonary:      Effort: Pulmonary effort is normal. No respiratory distress.      Breath sounds: Normal breath sounds.   Abdominal:      General: Abdomen is flat. There is no distension.      Palpations: Abdomen is soft. There is no mass.      Tenderness: There is no abdominal tenderness. There is no right CVA tenderness, left CVA tenderness, guarding or rebound.      Hernia: No hernia is present.   Musculoskeletal:      Right lower leg: No edema.      Left lower leg: No edema.   Skin:     General: Skin is warm and dry.   Neurological:      Mental Status: She is alert and oriented to person, place, and time.   Psychiatric:         Mood and Affect: Mood normal.         Behavior: Behavior normal.          A chaperone was offered to the patient during today's exam. Patient declined chaperone.        Assessment & Plan:     71 y.o. female with the following -     1. " Other specified hypothyroidism  Chronic, started taking half of original dose, will check lab in office today and f/u on dose adjustment once results are back.   - TSH WITH REFLEX TO FT4; Future    2. Anxiety and depression  Chronic, patient has plan to decrease stress. Discussed going back up to Lexapro 20 mg if needed. Will f/u in a few weeks at next appointment.     3. Environmental allergies   Chronic, will start back on Flonase daily and allegra if needed.     I spent a total of 47 minutes with record review, exam, communication with the patient, communication with other providers, and documentation of this encounter.      Return in about 2 weeks (around 11/23/2021), or if symptoms worsen or fail to improve.    Please note that this dictation was created using voice recognition software. I have made every reasonable attempt to correct obvious errors, but I expect that there are errors of grammar and possibly content that I did not discover before finalizing the note.

## 2021-11-09 NOTE — ASSESSMENT & PLAN NOTE
Chronic, she recently cut her Levothyroxine dose in half to 37.5 mg daily. She endorses she feels more anxious, constipation, feeling fatigued. She denies palpitations or tremors. Will check TSH w T4 today in clinic and discuss correct dosing once lab values are back   Ref Range & Units 2 mo ago   TSH 0.380 - 5.330 uIU/mL 1.560     .

## 2021-11-09 NOTE — ASSESSMENT & PLAN NOTE
"Chronic, ongoing. She has cut back on her Lexapro to 10 mg most days, she took 20 mg two days this last week. She feels increased stress from husbands medical problems and her own small health concerns she has. Her sleep has improved with myrbetriq but also politics are stressful for her along with social interaction. 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 She also plans to get outside more which improves her mood as well. Denies SI/HI. Discussed going back to Lexapro 20 mg daily if her moods do not improve.    "

## 2021-11-09 NOTE — PROGRESS NOTES
Patient arrived for blood draw.  Verified patient's name/date-of-birth and reason for visit before procedure was started. Patient's left antecubital cleansed. Venipuncture attempts X1. Blood draw completed on patient's left AC. Applied pressure afterwards and placed Coban on site of venipuncture with directions for patient to remove within the next hour. Patient tolerated procedure well, no adverse reactions. Patient ambulated safely upon leaving clinic.   Completed labels were placed on blood samples in draw room with patient present. Appropriate blood samples were centrifuged. Blood samples were then placed in locked lab box for  pick-up.

## 2021-11-09 NOTE — ASSESSMENT & PLAN NOTE
"Chronic. She endorses she is allergic to \"everything\" outside, mostly kim brush. She was previously taking Flonase and allegra, she is currently not taking allegra or the Flonase. Mild effusion behind R TM. She has increased ear pressure/tinnitus and rhinorrhea. Discussed starting Flonase back to 1-2 sprays in each nostril daily, allegra if needed. Pt agreeable with plan.   "

## 2021-11-23 ENCOUNTER — OFFICE VISIT (OUTPATIENT)
Dept: MEDICAL GROUP | Facility: PHYSICIAN GROUP | Age: 71
End: 2021-11-23
Payer: MEDICARE

## 2021-11-23 VITALS
RESPIRATION RATE: 16 BRPM | OXYGEN SATURATION: 95 % | HEART RATE: 57 BPM | BODY MASS INDEX: 21.55 KG/M2 | DIASTOLIC BLOOD PRESSURE: 64 MMHG | SYSTOLIC BLOOD PRESSURE: 132 MMHG | WEIGHT: 145.5 LBS | HEIGHT: 69 IN | TEMPERATURE: 97.7 F

## 2021-11-23 DIAGNOSIS — F41.9 ANXIETY AND DEPRESSION: ICD-10-CM

## 2021-11-23 DIAGNOSIS — E03.8 OTHER SPECIFIED HYPOTHYROIDISM: ICD-10-CM

## 2021-11-23 DIAGNOSIS — Z78.0 POST-MENOPAUSAL: ICD-10-CM

## 2021-11-23 DIAGNOSIS — M15.9 PRIMARY OSTEOARTHRITIS INVOLVING MULTIPLE JOINTS: ICD-10-CM

## 2021-11-23 DIAGNOSIS — F32.A ANXIETY AND DEPRESSION: ICD-10-CM

## 2021-11-23 DIAGNOSIS — E78.2 MIXED HYPERLIPIDEMIA: ICD-10-CM

## 2021-11-23 PROCEDURE — 99214 OFFICE O/P EST MOD 30 MIN: CPT | Performed by: INTERNAL MEDICINE

## 2021-11-23 RX ORDER — ESTRADIOL 0.5 MG/1
0.5 TABLET ORAL DAILY
Qty: 90 TABLET | Refills: 3 | Status: SHIPPED | OUTPATIENT
Start: 2021-11-23

## 2021-11-23 RX ORDER — FLUOROMETHOLONE 0.1 %
SUSPENSION, DROPS(FINAL DOSAGE FORM)(ML) OPHTHALMIC (EYE)
COMMUNITY
Start: 2021-11-17

## 2021-11-23 RX ORDER — CELECOXIB 200 MG/1
200 CAPSULE ORAL DAILY
COMMUNITY
End: 2021-11-23 | Stop reason: SDUPTHER

## 2021-11-23 RX ORDER — ESCITALOPRAM OXALATE 20 MG/1
20 TABLET ORAL
Qty: 90 TABLET | Refills: 3 | Status: SHIPPED | OUTPATIENT
Start: 2021-11-23

## 2021-11-23 RX ORDER — CELECOXIB 200 MG/1
200-400 CAPSULE ORAL DAILY
Qty: 180 CAPSULE | Refills: 3 | Status: SHIPPED | OUTPATIENT
Start: 2021-11-23

## 2021-11-23 ASSESSMENT — ANXIETY QUESTIONNAIRES
1. FEELING NERVOUS, ANXIOUS, OR ON EDGE: SEVERAL DAYS
IF YOU CHECKED OFF ANY PROBLEMS ON THIS QUESTIONNAIRE, HOW DIFFICULT HAVE THESE PROBLEMS MADE IT FOR YOU TO DO YOUR WORK, TAKE CARE OF THINGS AT HOME, OR GET ALONG WITH OTHER PEOPLE: SOMEWHAT DIFFICULT
4. TROUBLE RELAXING: SEVERAL DAYS
7. FEELING AFRAID AS IF SOMETHING AWFUL MIGHT HAPPEN: NOT AT ALL
2. NOT BEING ABLE TO STOP OR CONTROL WORRYING: SEVERAL DAYS
6. BECOMING EASILY ANNOYED OR IRRITABLE: SEVERAL DAYS
GAD7 TOTAL SCORE: 6
3. WORRYING TOO MUCH ABOUT DIFFERENT THINGS: SEVERAL DAYS
5. BEING SO RESTLESS THAT IT IS HARD TO SIT STILL: SEVERAL DAYS

## 2021-11-23 ASSESSMENT — PATIENT HEALTH QUESTIONNAIRE - PHQ9
5. POOR APPETITE OR OVEREATING: 1 - SEVERAL DAYS
CLINICAL INTERPRETATION OF PHQ2 SCORE: 2
SUM OF ALL RESPONSES TO PHQ QUESTIONS 1-9: 12

## 2021-11-23 ASSESSMENT — FIBROSIS 4 INDEX: FIB4 SCORE: 1.94

## 2021-11-23 NOTE — ASSESSMENT & PLAN NOTE
Chronic and decompensated problem.  She took taking her Celebrex and has significant worsening of pain.  She would like to go back on the Celebrex and continue using baclofen as needed.  Prescriptions have been updated and I asked her to keep me informed over MyChart with her progress.  Offered pain physician referral which she declines at this time.

## 2021-11-23 NOTE — ASSESSMENT & PLAN NOTE
Chronic and worsening problem.  Will increase Lexapro to 20 mg daily.  She had been alternating 10 mg with the occasional 20 mg when needed.  PHQ-9 and ANDREA-7 updated as above.

## 2021-11-23 NOTE — PROGRESS NOTES
Subjective:   Chief Complaint/History of Present Illness:  Malou Pretty is a 71 y.o. female established patient who presents today to discuss medical problems as listed below. Malou is unaccompanied for today's visit.    Problem   Mixed Hyperlipidemia    This is a new problem and she has never been on statin medication in the past.  [facility] decision aid for starting statin medication showed 10 year ASCVD risk of 8%, improved to 5-6% with initiation of high and stand potency statin therapy.     We tried starting atorvastatin 20 mg and she developed side effects and these persisted at 10 mg so she stopped the medicine altogether.  We also tried red yeast rice extract with coenzyme Q 10 but she feels like she had cumulative side effects and ultimately stopped the medicine after a bout of myalgias and GI upset (diarrhea).  No personal history of cardiovascular or cerebrovascular disease.  We did note improvement of her cholesterol values on the red yeast rice extract.  No prior coronary calcium score completed.       Ref. Range 9/4/2020 07:29   Cholesterol,Tot Latest Ref Range: 100 - 199 mg/dL 254 (H)   Triglycerides Latest Ref Range: 0 - 149 mg/dL 50   HDL Latest Ref Range: >=40 mg/dL 112   LDL Latest Ref Range: <100 mg/dL 132 (H)        Anxiety and Depression    Depression Screening (11/23/21)  Little interest or pleasure in doing things?  1 - several days   Feeling down, depressed , or hopeless? 1 - several days   Trouble falling or staying asleep, or sleeping too much?  2 - more than half the days   Feeling tired or having little energy?  3 - nearly every day   Poor appetite or overeating?  1 - several days   Feeling bad about yourself - or that you are a failure or have let yourself or your family down? 1 - several days   Trouble concentrating on things, such as reading the newspaper or watching television? 1 - several days   Moving or speaking so slowly that other people could have noticed.  Or the  opposite - being so fidgety or restless that you have been moving around a lot more than usual?  2 - more than half the days   Thoughts that you would be better off dead, or of hurting yourself?  0 - not at all   Patient Health Questionnaire Score: 12     ANDREA-7 Questionnaire (11/23/21)    Feeling nervous, anxious, or on edge: Several days  Not being able to sop or control worrying: Several days  Worrying too much about different things: Several days  Trouble relaxing: Several days  Being so restless that it's hard to sit still: Several days  Becoming easily annoyed or irritable: Several days  Feeling afraid as if something awful might happen: Not at all  Total: 6    She has a long-standing history of anxiety-predominant mood disorder.  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  She is using Lexapro 10 mg-20 mg daily which she feels keeps her mood even keel.  She denies any significant mood fluctuations.  No side effects to current regiment.    Current regimen: lexapro 20 mg daily  Previous regimen: lexapro 10 mg daily     Primary Osteoarthritis Involving Multiple Joints    She reports challenges with joint pains in the right shoulder, bilateral hands, and right knee.  She is status post right shoulder arthroscopy and right total knee replacement.  She was previously utilizing meloxicam but this led to dyspepsia and she was transitioned onto Celebrex which she is utilizing every other day.  It does cause stomach irritation occasionally but she has no history of GI bleed and denies current melena, hematochezia, or abdominal pain.  At this point she has had the shoulder and the knee operated on but with her hands will still notice stiffness and swelling if she does not take the Celebrex.    Current regimen: Celebrex 200 mg daily and flexeril 10 mg prn (few times per month)     Hypothyroidism       Ref. Range  11/9/2021 09:40   TSH Latest Ref Range: 0.380 - 5.330 uIU/mL 2.060     She has maintained on levothyroxine with stable thyroid numbers, no recent dose adjustment.  No tremor, changes in hair or nails, or palpitations.    Current regimen: Levothyroxine 37.5 mcg daily            Current Medications:  Current Outpatient Medications Ordered in Epic   Medication Sig Dispense Refill   • fluorometholone (FML) 0.1 % Suspension SHAKE LIQUID AND INSTILL 1 DROP IN BOTH EYES THREE TIMES DAILY AS NEEDED     • Sennosides (SENOKOT PO) Take  by mouth.     • Mirabegron (MYRBETRIQ PO) Take  by mouth.     • escitalopram (LEXAPRO) 20 MG tablet Take 1 Tablet by mouth every day. 90 Tablet 3   • celecoxib (CELEBREX) 200 MG Cap Take 1-2 Capsules by mouth every day. 180 Capsule 3   • estradiol (ESTRACE) 0.5 MG tablet Take 1 Tablet by mouth every day. 90 Tablet 3   • gabapentin (NEURONTIN) 100 MG Cap gabapentin 100 mg capsule   TAKE 3 CAPSULES BY MOUTH 3 TIMES A DAY     • albuterol 108 (90 Base) MCG/ACT Aero Soln inhalation aerosol Inhale 2 Puffs every 6 hours as needed for Shortness of Breath. 8.5 g 1   • Acetaminophen (TYLENOL PO) Take  by mouth. 650mg     • baclofen (LIORESAL) 10 MG Tab Take 1 tablet by mouth 3 times a day. (Patient taking differently: Take 5 mg by mouth 2 times a day. Every 2nd or third night) 90 tablet 1   • SUMAtriptan (IMITREX) 50 MG Tab Take 1 tablet by mouth one time as needed. 10 tablet 1   • famotidine (PEPCID) 20 MG Tab Take 20 mg by mouth 1 time a day as needed for Heartburn.     • levothyroxine (SYNTHROID) 75 MCG Tab Take 1 tablet by mouth Every morning on an empty stomach. (Patient taking differently: Take 37.5 mcg by mouth every morning on an empty stomach.) 90 tablet 3   • Calcium Carb-Cholecalciferol (CALCIUM 1000 + D PO) Take 1 Cap by mouth 1 time daily as needed.     • Omega-3 Fatty Acids (FISH OIL) 1200 MG Cap Take  by mouth. (Patient not taking: Reported on 9/17/2021)     • fexofenadine (ALLEGRA) 30 MG  "tablet Take 30 mg by mouth. (Patient not taking: Reported on 11/9/2021)       No current [facility]-ordered facility-administered medications on file.          Objective:   Physical Exam:    Vitals: /64 (BP Location: Left arm, Patient Position: Sitting, BP Cuff Size: Adult)   Pulse (!) 57   Temp 36.5 °C (97.7 °F) (Temporal)   Resp 16   Ht 1.753 m (5' 9\")   Wt 66 kg (145 lb 8 oz)   SpO2 95%    BMI: Body mass index is 21.49 kg/m².  Physical Exam  Constitutional:       General: She is not in acute distress.     Appearance: Normal appearance. She is not ill-appearing.   HENT:      Right Ear: Ear canal normal. There is no impacted cerumen.      Left Ear: Ear canal normal. There is no impacted cerumen.   Eyes:      General: No scleral icterus.     Conjunctiva/sclera: Conjunctivae normal.   Cardiovascular:      Rate and Rhythm: Normal rate and regular rhythm.      Pulses: Normal pulses.   Pulmonary:      Effort: Pulmonary effort is normal. No respiratory distress.      Breath sounds: Normal breath sounds. No wheezing.   Abdominal:      General: Bowel sounds are normal.      Palpations: Abdomen is soft.   Musculoskeletal:         General: Deformity present.      Comments: Swelling of bilateral hand joints   Skin:     General: Skin is warm and dry.      Findings: Bruising present. No rash.   Psychiatric:         Behavior: Behavior normal.         Thought Content: Thought content normal.         Judgment: Judgment normal.      Comments: Tearful during encounter               Assessment and Plan:   Malou is a 71 y.o. female with the following:  Problem List Items Addressed This Visit     Anxiety and depression     Chronic and worsening problem.  Will increase Lexapro to 20 mg daily.  She had been alternating 10 mg with the occasional 20 mg when needed.  PHQ-9 and ANDREA-7 updated as above.         Relevant Medications    escitalopram (LEXAPRO) 20 MG tablet    Primary osteoarthritis involving multiple joints     Chronic and " decompensated problem.  She took taking her Celebrex and has significant worsening of pain.  She would like to go back on the Celebrex and continue using baclofen as needed.  Prescriptions have been updated and I asked her to keep me informed over MyChart with her progress.  Offered pain physician referral which she declines at this time.         Relevant Medications    celecoxib (CELEBREX) 200 MG Cap    Hypothyroidism     Chronic and ongoing problem.  Thyroid levels are stable.  Continue levothyroxine 37.5 mcg daily.  Update levels before next visit.         Relevant Orders    CBC WITH DIFFERENTIAL    Comp Metabolic Panel    VITAMIN D,25 HYDROXY    TSH    FREE THYROXINE    Mixed hyperlipidemia     Chronic and ongoing problem.  Did not tolerate statin therapy.  Mild plaque on calcium score in the past.         Relevant Orders    CBC WITH DIFFERENTIAL    Comp Metabolic Panel    Lipid Profile      Other Visit Diagnoses     Post-menopausal        Relevant Medications    estradiol (ESTRACE) 0.5 MG tablet             RTC: Return in about 3 months (around 2/23/2022).    I spent a total of 38 minutes with record review, exam, communication with the patient, communication with other providers, and documentation of this encounter.    PLEASE NOTE: This dictation was created using voice recognition software. I have made every reasonable attempt to correct obvious errors, but I expect that there are errors of grammar and possibly content that I did not discover before finalizing the note.      Melba Hamilton, DO  Geriatric and Internal Medicine  [facility]

## 2021-11-23 NOTE — ASSESSMENT & PLAN NOTE
Chronic and ongoing problem.  Did not tolerate statin therapy.  Mild plaque on calcium score in the past.

## 2021-11-23 NOTE — ASSESSMENT & PLAN NOTE
Chronic and ongoing problem.  Thyroid levels are stable.  Continue levothyroxine 37.5 mcg daily.  Update levels before next visit.

## 2021-12-28 ENCOUNTER — HOSPITAL ENCOUNTER (OUTPATIENT)
Dept: LAB | Facility: MEDICAL CENTER | Age: 71
End: 2021-12-28
Attending: INTERNAL MEDICINE
Payer: MEDICARE

## 2021-12-28 DIAGNOSIS — E03.8 OTHER SPECIFIED HYPOTHYROIDISM: ICD-10-CM

## 2021-12-28 DIAGNOSIS — E78.2 MIXED HYPERLIPIDEMIA: ICD-10-CM

## 2021-12-28 LAB
25(OH)D3 SERPL-MCNC: 55 NG/ML (ref 30–100)
ALBUMIN SERPL BCP-MCNC: 4.1 G/DL (ref 3.2–4.9)
ALBUMIN/GLOB SERPL: 1.6 G/DL
ALP SERPL-CCNC: 27 U/L (ref 30–99)
ALT SERPL-CCNC: 20 U/L (ref 2–50)
ANION GAP SERPL CALC-SCNC: 11 MMOL/L (ref 7–16)
AST SERPL-CCNC: 23 U/L (ref 12–45)
BASOPHILS # BLD AUTO: 1.5 % (ref 0–1.8)
BASOPHILS # BLD: 0.09 K/UL (ref 0–0.12)
BILIRUB SERPL-MCNC: 0.3 MG/DL (ref 0.1–1.5)
BUN SERPL-MCNC: 12 MG/DL (ref 8–22)
CALCIUM SERPL-MCNC: 8.4 MG/DL (ref 8.5–10.5)
CHLORIDE SERPL-SCNC: 94 MMOL/L (ref 96–112)
CHOLEST SERPL-MCNC: 221 MG/DL (ref 100–199)
CO2 SERPL-SCNC: 27 MMOL/L (ref 20–33)
CREAT SERPL-MCNC: 0.75 MG/DL (ref 0.5–1.4)
EOSINOPHIL # BLD AUTO: 0.25 K/UL (ref 0–0.51)
EOSINOPHIL NFR BLD: 4.3 % (ref 0–6.9)
ERYTHROCYTE [DISTWIDTH] IN BLOOD BY AUTOMATED COUNT: 43.8 FL (ref 35.9–50)
FASTING STATUS PATIENT QL REPORTED: NORMAL
GLOBULIN SER CALC-MCNC: 2.6 G/DL (ref 1.9–3.5)
GLUCOSE SERPL-MCNC: 94 MG/DL (ref 65–99)
HCT VFR BLD AUTO: 41.1 % (ref 37–47)
HDLC SERPL-MCNC: 99 MG/DL
HGB BLD-MCNC: 14.2 G/DL (ref 12–16)
IMM GRANULOCYTES # BLD AUTO: 0.02 K/UL (ref 0–0.11)
IMM GRANULOCYTES NFR BLD AUTO: 0.3 % (ref 0–0.9)
LDLC SERPL CALC-MCNC: 116 MG/DL
LYMPHOCYTES # BLD AUTO: 1.81 K/UL (ref 1–4.8)
LYMPHOCYTES NFR BLD: 30.8 % (ref 22–41)
MCH RBC QN AUTO: 30.7 PG (ref 27–33)
MCHC RBC AUTO-ENTMCNC: 34.5 G/DL (ref 33.6–35)
MCV RBC AUTO: 89 FL (ref 81.4–97.8)
MONOCYTES # BLD AUTO: 0.91 K/UL (ref 0–0.85)
MONOCYTES NFR BLD AUTO: 15.5 % (ref 0–13.4)
NEUTROPHILS # BLD AUTO: 2.79 K/UL (ref 2–7.15)
NEUTROPHILS NFR BLD: 47.6 % (ref 44–72)
NRBC # BLD AUTO: 0 K/UL
NRBC BLD-RTO: 0 /100 WBC
PLATELET # BLD AUTO: 137 K/UL (ref 164–446)
PMV BLD AUTO: 11 FL (ref 9–12.9)
POTASSIUM SERPL-SCNC: 4.2 MMOL/L (ref 3.6–5.5)
PROT SERPL-MCNC: 6.7 G/DL (ref 6–8.2)
RBC # BLD AUTO: 4.62 M/UL (ref 4.2–5.4)
SODIUM SERPL-SCNC: 132 MMOL/L (ref 135–145)
T4 FREE SERPL-MCNC: 1.13 NG/DL (ref 0.93–1.7)
TRIGL SERPL-MCNC: 32 MG/DL (ref 0–149)
TSH SERPL DL<=0.005 MIU/L-ACNC: 2.39 UIU/ML (ref 0.38–5.33)
WBC # BLD AUTO: 5.9 K/UL (ref 4.8–10.8)

## 2021-12-28 PROCEDURE — 80061 LIPID PANEL: CPT

## 2021-12-28 PROCEDURE — 84439 ASSAY OF FREE THYROXINE: CPT

## 2021-12-28 PROCEDURE — 84443 ASSAY THYROID STIM HORMONE: CPT

## 2021-12-28 PROCEDURE — 85025 COMPLETE CBC W/AUTO DIFF WBC: CPT

## 2021-12-28 PROCEDURE — 82306 VITAMIN D 25 HYDROXY: CPT | Mod: GA

## 2021-12-28 PROCEDURE — 80053 COMPREHEN METABOLIC PANEL: CPT

## 2021-12-28 PROCEDURE — 36415 COLL VENOUS BLD VENIPUNCTURE: CPT

## 2022-02-03 ENCOUNTER — APPOINTMENT (RX ONLY)
Dept: URBAN - METROPOLITAN AREA CLINIC 4 | Facility: CLINIC | Age: 72
Setting detail: DERMATOLOGY
End: 2022-02-03

## 2022-02-03 DIAGNOSIS — D22 MELANOCYTIC NEVI: ICD-10-CM

## 2022-02-03 DIAGNOSIS — D18.0 HEMANGIOMA: ICD-10-CM

## 2022-02-03 DIAGNOSIS — L82.1 OTHER SEBORRHEIC KERATOSIS: ICD-10-CM

## 2022-02-03 DIAGNOSIS — L60.3 NAIL DYSTROPHY: ICD-10-CM

## 2022-02-03 DIAGNOSIS — L81.4 OTHER MELANIN HYPERPIGMENTATION: ICD-10-CM

## 2022-02-03 PROBLEM — D22.72 MELANOCYTIC NEVI OF LEFT LOWER LIMB, INCLUDING HIP: Status: ACTIVE | Noted: 2022-02-03

## 2022-02-03 PROBLEM — D22.61 MELANOCYTIC NEVI OF RIGHT UPPER LIMB, INCLUDING SHOULDER: Status: ACTIVE | Noted: 2022-02-03

## 2022-02-03 PROBLEM — D18.01 HEMANGIOMA OF SKIN AND SUBCUTANEOUS TISSUE: Status: ACTIVE | Noted: 2022-02-03

## 2022-02-03 PROBLEM — D22.62 MELANOCYTIC NEVI OF LEFT UPPER LIMB, INCLUDING SHOULDER: Status: ACTIVE | Noted: 2022-02-03

## 2022-02-03 PROBLEM — D22.5 MELANOCYTIC NEVI OF TRUNK: Status: ACTIVE | Noted: 2022-02-03

## 2022-02-03 PROBLEM — D22.71 MELANOCYTIC NEVI OF RIGHT LOWER LIMB, INCLUDING HIP: Status: ACTIVE | Noted: 2022-02-03

## 2022-02-03 PROCEDURE — ? DIAGNOSIS COMMENT

## 2022-02-03 PROCEDURE — 99213 OFFICE O/P EST LOW 20 MIN: CPT

## 2022-02-03 PROCEDURE — ? COUNSELING

## 2022-02-03 ASSESSMENT — LOCATION DETAILED DESCRIPTION DERM
LOCATION DETAILED: INFERIOR THORACIC SPINE
LOCATION DETAILED: LEFT LATERAL ELBOW
LOCATION DETAILED: RIGHT PROXIMAL POSTERIOR UPPER ARM
LOCATION DETAILED: RIGHT CENTRAL EYEBROW
LOCATION DETAILED: RIGHT DISTAL POSTERIOR THIGH
LOCATION DETAILED: RIGHT POPLITEAL SKIN
LOCATION DETAILED: MIDDLE STERNUM
LOCATION DETAILED: RIGHT LATERAL SUPERIOR CHEST
LOCATION DETAILED: RIGHT DISTAL POSTERIOR UPPER ARM
LOCATION DETAILED: LEFT DISTAL POSTERIOR UPPER ARM
LOCATION DETAILED: LEFT DISTAL DORSAL FOREARM
LOCATION DETAILED: RIGHT VENTRAL PROXIMAL FOREARM
LOCATION DETAILED: LEFT PROXIMAL PRETIBIAL REGION
LOCATION DETAILED: RIGHT BUTTOCK
LOCATION DETAILED: RIGHT SMALL FINGERNAIL
LOCATION DETAILED: RIGHT ANTERIOR DISTAL THIGH
LOCATION DETAILED: LOWER STERNUM
LOCATION DETAILED: RIGHT PROXIMAL PRETIBIAL REGION
LOCATION DETAILED: RIGHT SUPERIOR UPPER BACK
LOCATION DETAILED: LEFT VENTRAL LATERAL PROXIMAL FOREARM
LOCATION DETAILED: LEFT POPLITEAL SKIN
LOCATION DETAILED: RIGHT PROXIMAL DORSAL FOREARM
LOCATION DETAILED: SUBXIPHOID
LOCATION DETAILED: LEFT DISTAL POSTERIOR THIGH

## 2022-02-03 ASSESSMENT — LOCATION SIMPLE DESCRIPTION DERM
LOCATION SIMPLE: RIGHT POSTERIOR UPPER ARM
LOCATION SIMPLE: RIGHT THIGH
LOCATION SIMPLE: LEFT PRETIBIAL REGION
LOCATION SIMPLE: RIGHT BUTTOCK
LOCATION SIMPLE: ABDOMEN
LOCATION SIMPLE: RIGHT PRETIBIAL REGION
LOCATION SIMPLE: LEFT FOREARM
LOCATION SIMPLE: RIGHT POPLITEAL SKIN
LOCATION SIMPLE: RIGHT FOREARM
LOCATION SIMPLE: LEFT POSTERIOR THIGH
LOCATION SIMPLE: LEFT ELBOW
LOCATION SIMPLE: LEFT POPLITEAL SKIN
LOCATION SIMPLE: RIGHT UPPER BACK
LOCATION SIMPLE: LEFT POSTERIOR UPPER ARM
LOCATION SIMPLE: RIGHT EYEBROW
LOCATION SIMPLE: RIGHT POSTERIOR THIGH
LOCATION SIMPLE: CHEST
LOCATION SIMPLE: UPPER BACK
LOCATION SIMPLE: RIGHT SMALL FINGERNAIL

## 2022-02-03 ASSESSMENT — LOCATION ZONE DERM
LOCATION ZONE: LEG
LOCATION ZONE: FACE
LOCATION ZONE: ARM
LOCATION ZONE: TRUNK
LOCATION ZONE: FINGERNAIL

## 2022-02-03 NOTE — PROCEDURE: DIAGNOSIS COMMENT
Comment: Order X-ray of right hand at [facility].
Detail Level: Simple
Render Risk Assessment In Note?: no

## 2022-02-17 ENCOUNTER — TELEPHONE (OUTPATIENT)
Dept: PHYSICAL THERAPY | Facility: REHABILITATION | Age: 72
End: 2022-02-17
Payer: MEDICARE

## 2022-02-17 NOTE — OP THERAPY DISCHARGE SUMMARY
Outpatient Physical Therapy  DISCHARGE SUMMARY NOTE      [facility] [address], [address]  [place] [address]  Phone:  [phone]    Date of Visit: 02/17/2022    Patient: Malou Pretty  YOB: 1950  MRN: [id number]     Referring Provider:  Dottie Jackson M.D.     Referring Diagnosis  Chronic pain of left knee +1 more           Functional Assessment Used        Your patient is being discharged from Physical Therapy with the following comments:   · Patient has failed to schedule or reschedule follow-up visits    Comments:  Patient seen for 6 visits with the most recent on 3/22/2021     Limitations Remaining:    Please see daily treatment notes for details  Recommendations:  Discharge from PT    aJmee Beauchamp PT, MSPT    Date: 2/17/2022

## 2022-03-02 ENCOUNTER — APPOINTMENT (OUTPATIENT)
Dept: MEDICAL GROUP | Facility: PHYSICIAN GROUP | Age: 72
End: 2022-03-02
Payer: MEDICARE

## 2022-05-05 ENCOUNTER — APPOINTMENT (RX ONLY)
Dept: URBAN - METROPOLITAN AREA CLINIC 4 | Facility: CLINIC | Age: 72
Setting detail: DERMATOLOGY
End: 2022-05-05

## 2022-05-05 DIAGNOSIS — L60.3 NAIL DYSTROPHY: ICD-10-CM

## 2022-05-05 DIAGNOSIS — L82.1 OTHER SEBORRHEIC KERATOSIS: ICD-10-CM

## 2022-05-05 PROCEDURE — ? COUNSELING

## 2022-05-05 PROCEDURE — ? NAIL CLIPPING FOR PAS

## 2022-05-05 PROCEDURE — 99212 OFFICE O/P EST SF 10 MIN: CPT

## 2022-05-05 PROCEDURE — ? ADDITIONAL NOTES

## 2022-05-05 ASSESSMENT — LOCATION ZONE DERM
LOCATION ZONE: TRUNK
LOCATION ZONE: FINGERNAIL

## 2022-05-05 ASSESSMENT — LOCATION SIMPLE DESCRIPTION DERM
LOCATION SIMPLE: LEFT LOWER BACK
LOCATION SIMPLE: RIGHT SMALL FINGERNAIL

## 2022-05-05 ASSESSMENT — LOCATION DETAILED DESCRIPTION DERM
LOCATION DETAILED: LEFT SUPERIOR LATERAL MIDBACK
LOCATION DETAILED: RIGHT SMALL FINGERNAIL

## 2022-05-05 NOTE — PROCEDURE: ADDITIONAL NOTES
Render Risk Assessment In Note?: no
Detail Level: Zone
Additional Notes: PLEASE FILTER SAMPLE FOR SCRAPING DEBRIS.

## 2022-05-05 NOTE — PROCEDURE: NAIL CLIPPING FOR PAS
Detail Level: Detailed
Billing Type: Third-Party Bill
Add [id number] To Bill?: No
Lab: [id number]
Lab Facility:

## 2022-05-13 ENCOUNTER — RX ONLY (OUTPATIENT)
Age: 72
Setting detail: RX ONLY
End: 2022-05-13

## 2022-05-13 RX ORDER — LAMOTRIGINE 150 MG/1
TABLET ORAL
Qty: 30 | Refills: 1 | Status: ERX

## 2022-05-17 ENCOUNTER — RX ONLY (OUTPATIENT)
Age: 72
Setting detail: RX ONLY
End: 2022-05-17

## 2022-05-17 RX ORDER — TERBINAFINE HYDROCHLORIDE 250 MG/1
TABLET ORAL
Qty: 30 | Refills: 0 | Status: ERX | COMMUNITY
Start: 2022-05-17

## 2022-06-02 ENCOUNTER — RX ONLY (OUTPATIENT)
Age: 72
Setting detail: RX ONLY
End: 2022-06-02

## 2022-06-02 RX ORDER — TAVABOROLE 43.5 MG/ML
SOLUTION TOPICAL
Qty: 4 | Refills: 12 | Status: ERX | COMMUNITY
Start: 2022-06-02

## 2022-07-15 ENCOUNTER — APPOINTMENT (RX ONLY)
Dept: URBAN - METROPOLITAN AREA CLINIC 4 | Facility: CLINIC | Age: 72
Setting detail: DERMATOLOGY
End: 2022-07-15

## 2022-07-15 DIAGNOSIS — L72.0 EPIDERMAL CYST: ICD-10-CM

## 2022-07-15 DIAGNOSIS — B35.1 TINEA UNGUIUM: ICD-10-CM

## 2022-07-15 PROCEDURE — ? COUNSELING

## 2022-07-15 PROCEDURE — ? BENIGN DESTRUCTION COSMETIC

## 2022-07-15 PROCEDURE — 99213 OFFICE O/P EST LOW 20 MIN: CPT

## 2022-07-15 PROCEDURE — ? BENIGN DESTRUCTION

## 2022-07-15 ASSESSMENT — LOCATION SIMPLE DESCRIPTION DERM
LOCATION SIMPLE: RIGHT SMALL FINGERNAIL
LOCATION SIMPLE: LEFT FOREHEAD

## 2022-07-15 ASSESSMENT — LOCATION DETAILED DESCRIPTION DERM
LOCATION DETAILED: RIGHT SMALL FINGERNAIL
LOCATION DETAILED: LEFT INFERIOR FOREHEAD

## 2022-07-15 ASSESSMENT — LOCATION ZONE DERM
LOCATION ZONE: FINGERNAIL
LOCATION ZONE: FACE

## 2022-07-15 NOTE — PROCEDURE: BENIGN DESTRUCTION
Consent: The patient's consent was obtained including but not limited to risks of crusting, scabbing, blistering, scarring, darker or lighter pigmentary change, recurrence, incomplete removal and infection.
Medical Necessity Clause: This procedure was medically necessary because the lesions that were treated were:
Post-Care Instructions: I reviewed with the patient in detail post-care instructions. Patient is to wear sunprotection, and avoid picking at any of the treated lesions. Pt may apply Vaseline to crusted or scabbing areas.
Medical Necessity Information: It is in your best interest to select a reason for this procedure from the list below. All of these items fulfill various CMS LCD requirements except the new and changing color options.
Render Post-Care Instructions In Note?: no
Treatment Number (Will Not Render If 0): 0
Anesthesia Volume In Cc: 0.5
Detail Level: Detailed

## 2022-11-11 ENCOUNTER — PATIENT MESSAGE (OUTPATIENT)
Dept: HEALTH INFORMATION MANAGEMENT | Facility: OTHER | Age: 72
End: 2022-11-11

## 2023-02-10 ENCOUNTER — APPOINTMENT (RX ONLY)
Dept: URBAN - METROPOLITAN AREA CLINIC 4 | Facility: CLINIC | Age: 73
Setting detail: DERMATOLOGY
End: 2023-02-10

## 2023-02-10 DIAGNOSIS — L81.4 OTHER MELANIN HYPERPIGMENTATION: ICD-10-CM

## 2023-02-10 DIAGNOSIS — D18.0 HEMANGIOMA: ICD-10-CM

## 2023-02-10 DIAGNOSIS — L57.0 ACTINIC KERATOSIS: ICD-10-CM

## 2023-02-10 DIAGNOSIS — B35.1 TINEA UNGUIUM: ICD-10-CM

## 2023-02-10 DIAGNOSIS — L20.89 OTHER ATOPIC DERMATITIS: ICD-10-CM

## 2023-02-10 DIAGNOSIS — L60.3 NAIL DYSTROPHY: ICD-10-CM

## 2023-02-10 DIAGNOSIS — L82.1 OTHER SEBORRHEIC KERATOSIS: ICD-10-CM

## 2023-02-10 DIAGNOSIS — D22 MELANOCYTIC NEVI: ICD-10-CM

## 2023-02-10 PROBLEM — D22.71 MELANOCYTIC NEVI OF RIGHT LOWER LIMB, INCLUDING HIP: Status: ACTIVE | Noted: 2023-02-10

## 2023-02-10 PROBLEM — L20.84 INTRINSIC (ALLERGIC) ECZEMA: Status: ACTIVE | Noted: 2023-02-10

## 2023-02-10 PROBLEM — D22.72 MELANOCYTIC NEVI OF LEFT LOWER LIMB, INCLUDING HIP: Status: ACTIVE | Noted: 2023-02-10

## 2023-02-10 PROBLEM — D18.01 HEMANGIOMA OF SKIN AND SUBCUTANEOUS TISSUE: Status: ACTIVE | Noted: 2023-02-10

## 2023-02-10 PROBLEM — D22.5 MELANOCYTIC NEVI OF TRUNK: Status: ACTIVE | Noted: 2023-02-10

## 2023-02-10 PROBLEM — D22.61 MELANOCYTIC NEVI OF RIGHT UPPER LIMB, INCLUDING SHOULDER: Status: ACTIVE | Noted: 2023-02-10

## 2023-02-10 PROBLEM — D22.62 MELANOCYTIC NEVI OF LEFT UPPER LIMB, INCLUDING SHOULDER: Status: ACTIVE | Noted: 2023-02-10

## 2023-02-10 PROCEDURE — ? LIQUID NITROGEN

## 2023-02-10 PROCEDURE — 17003 DESTRUCT PREMALG LES 2-14: CPT

## 2023-02-10 PROCEDURE — ? PRESCRIPTION

## 2023-02-10 PROCEDURE — 99213 OFFICE O/P EST LOW 20 MIN: CPT | Mod: 25

## 2023-02-10 PROCEDURE — ? COUNSELING

## 2023-02-10 PROCEDURE — 17000 DESTRUCT PREMALG LESION: CPT

## 2023-02-10 RX ORDER — TRIAMCINOLONE ACETONIDE 1 MG/G
1 CREAM TOPICAL BID
Qty: 80 | Refills: 2 | Status: ERX | COMMUNITY
Start: 2023-02-10

## 2023-02-10 RX ADMIN — TRIAMCINOLONE ACETONIDE 1: 1 CREAM TOPICAL at 00:00

## 2023-02-10 ASSESSMENT — LOCATION DETAILED DESCRIPTION DERM
LOCATION DETAILED: RIGHT DISTAL POSTERIOR UPPER ARM
LOCATION DETAILED: MIDDLE STERNUM
LOCATION DETAILED: RIGHT PROXIMAL PRETIBIAL REGION
LOCATION DETAILED: LEFT DISTAL POSTERIOR UPPER ARM
LOCATION DETAILED: RIGHT DISTAL POSTERIOR THIGH
LOCATION DETAILED: LEFT INFERIOR CENTRAL MALAR CHEEK
LOCATION DETAILED: RIGHT SMALL FINGERNAIL
LOCATION DETAILED: RIGHT CENTRAL EYEBROW
LOCATION DETAILED: LEFT DISTAL POSTERIOR THIGH
LOCATION DETAILED: RIGHT UPPER CUTANEOUS LIP
LOCATION DETAILED: RIGHT SUPERIOR VERMILION BORDER
LOCATION DETAILED: RIGHT VENTRAL PROXIMAL FOREARM
LOCATION DETAILED: LEFT DISTAL DORSAL FOREARM
LOCATION DETAILED: LOWER STERNUM
LOCATION DETAILED: LEFT CENTRAL BUCCAL CHEEK
LOCATION DETAILED: LEFT LATERAL ELBOW
LOCATION DETAILED: INFERIOR THORACIC SPINE
LOCATION DETAILED: LEFT VENTRAL LATERAL PROXIMAL FOREARM
LOCATION DETAILED: LEFT POPLITEAL SKIN
LOCATION DETAILED: SUBXIPHOID
LOCATION DETAILED: RIGHT POPLITEAL SKIN
LOCATION DETAILED: RIGHT LATERAL SUPERIOR CHEST
LOCATION DETAILED: RIGHT PROXIMAL DORSAL FOREARM
LOCATION DETAILED: RIGHT PROXIMAL POSTERIOR UPPER ARM
LOCATION DETAILED: NASAL DORSUM
LOCATION DETAILED: RIGHT SUPERIOR UPPER BACK
LOCATION DETAILED: RIGHT ANTECUBITAL SKIN
LOCATION DETAILED: RIGHT MEDIAL TRAPEZIAL NECK
LOCATION DETAILED: LEFT PROXIMAL PRETIBIAL REGION
LOCATION DETAILED: RIGHT ANTERIOR DISTAL THIGH

## 2023-02-10 ASSESSMENT — LOCATION SIMPLE DESCRIPTION DERM
LOCATION SIMPLE: RIGHT FOREARM
LOCATION SIMPLE: NOSE
LOCATION SIMPLE: LEFT PRETIBIAL REGION
LOCATION SIMPLE: RIGHT UPPER ARM
LOCATION SIMPLE: LEFT ELBOW
LOCATION SIMPLE: RIGHT SMALL FINGERNAIL
LOCATION SIMPLE: RIGHT THIGH
LOCATION SIMPLE: RIGHT UPPER LIP
LOCATION SIMPLE: RIGHT POSTERIOR UPPER ARM
LOCATION SIMPLE: LEFT POPLITEAL SKIN
LOCATION SIMPLE: RIGHT POPLITEAL SKIN
LOCATION SIMPLE: RIGHT EYEBROW
LOCATION SIMPLE: RIGHT PRETIBIAL REGION
LOCATION SIMPLE: RIGHT UPPER BACK
LOCATION SIMPLE: POSTERIOR NECK
LOCATION SIMPLE: RIGHT LIP
LOCATION SIMPLE: LEFT CHEEK
LOCATION SIMPLE: LEFT POSTERIOR UPPER ARM
LOCATION SIMPLE: CHEST
LOCATION SIMPLE: LEFT FOREARM
LOCATION SIMPLE: UPPER BACK
LOCATION SIMPLE: ABDOMEN
LOCATION SIMPLE: RIGHT POSTERIOR THIGH
LOCATION SIMPLE: LEFT POSTERIOR THIGH

## 2023-02-10 ASSESSMENT — LOCATION ZONE DERM
LOCATION ZONE: NECK
LOCATION ZONE: TRUNK
LOCATION ZONE: NOSE
LOCATION ZONE: LIP
LOCATION ZONE: LEG
LOCATION ZONE: FINGERNAIL
LOCATION ZONE: FACE
LOCATION ZONE: ARM

## 2023-02-10 ASSESSMENT — ITCH NUMERIC RATING SCALE: HOW SEVERE IS YOUR ITCHING?: 2

## 2023-02-10 NOTE — PROCEDURE: COUNSELING
Detail Level: Zone
Detail Level: Detailed
Patient Specific Counseling (Will Not Stick From Patient To Patient): Recommended for patient to call the office if she notices growths underneath nail again, discussed doing potential x-ray.

## 2023-02-10 NOTE — PROCEDURE: MIPS QUALITY
Quality 226: Preventive Care And Screening: Tobacco Use: Screening And Cessation Intervention: Patient screened for tobacco use and is an ex/non-smoker
Quality 130: Documentation Of Current Medications In The Medical Record: Current Medications Documented
Detail Level: Detailed
Quality 431: Preventive Care And Screening: Unhealthy Alcohol Use - Screening: Patient not identified as an unhealthy alcohol user when screened for unhealthy alcohol use using a systematic screening method
Shawn Neely(Resident)

## 2023-03-24 ENCOUNTER — APPOINTMENT (RX ONLY)
Dept: URBAN - METROPOLITAN AREA CLINIC 4 | Facility: CLINIC | Age: 73
Setting detail: DERMATOLOGY
End: 2023-03-24

## 2023-03-24 DIAGNOSIS — B35.1 TINEA UNGUIUM: ICD-10-CM

## 2023-03-24 DIAGNOSIS — L82.1 OTHER SEBORRHEIC KERATOSIS: ICD-10-CM

## 2023-03-24 PROCEDURE — 99213 OFFICE O/P EST LOW 20 MIN: CPT

## 2023-03-24 PROCEDURE — ? LIQUID NITROGEN

## 2023-03-24 PROCEDURE — ? COUNSELING

## 2023-03-24 PROCEDURE — ? NAIL CLIPPING FOR PAS

## 2023-03-24 ASSESSMENT — LOCATION DETAILED DESCRIPTION DERM
LOCATION DETAILED: LEFT SUPERIOR MEDIAL MIDBACK
LOCATION DETAILED: INFERIOR THORACIC SPINE
LOCATION DETAILED: LEFT INFERIOR LATERAL UPPER BACK
LOCATION DETAILED: RIGHT MEDIAL UPPER BACK
LOCATION DETAILED: LEFT MID-UPPER BACK
LOCATION DETAILED: RIGHT INFERIOR UPPER BACK
LOCATION DETAILED: RIGHT SMALL FINGERNAIL
LOCATION DETAILED: RIGHT SUPERIOR MEDIAL UPPER BACK
LOCATION DETAILED: RIGHT SUPERIOR UPPER BACK
LOCATION DETAILED: RIGHT MID-UPPER BACK
LOCATION DETAILED: LEFT INFERIOR UPPER BACK
LOCATION DETAILED: LEFT SUPERIOR LATERAL MIDBACK

## 2023-03-24 ASSESSMENT — LOCATION SIMPLE DESCRIPTION DERM
LOCATION SIMPLE: RIGHT UPPER BACK
LOCATION SIMPLE: LEFT LOWER BACK
LOCATION SIMPLE: RIGHT SMALL FINGERNAIL
LOCATION SIMPLE: UPPER BACK
LOCATION SIMPLE: LEFT UPPER BACK

## 2023-03-24 ASSESSMENT — LOCATION ZONE DERM
LOCATION ZONE: TRUNK
LOCATION ZONE: FINGERNAIL

## 2023-03-24 NOTE — PROCEDURE: LIQUID NITROGEN
Include Z78.9 (Other Specified Conditions Influencing Health Status) As An Associated Diagnosis?: No
Show Spray Paint Technique Variable?: Yes
Duration Of Freeze Thaw-Cycle (Seconds): 0
Spray Paint Text: The liquid nitrogen was applied to the skin utilizing a spray paint frosting technique.
Medical Necessity Clause: This procedure was medically necessary because the lesions that were treated were:  If lesion does not resolve, bx is needed.
Consent: The patient's consent was obtained including but not limited to risks of crusting, scabbing, blistering, scarring, darker or lighter pigmentary change, recurrence, incomplete removal and infection.
Medical Necessity Information: It is in your best interest to select a reason for this procedure from the list below. All of these items fulfill various CMS LCD requirements except the new and changing color options.
Post-Care Instructions: I reviewed with the patient in detail post-care instructions. Patient is to wear sunprotection, and avoid picking at any of the treated lesions. Pt may apply Vaseline to crusted or scabbing areas.
Detail Level: Detailed

## 2023-03-24 NOTE — PROCEDURE: NAIL CLIPPING FOR PAS
Detail Level: Detailed
Billing Type: Third-Party Bill
Add [id number] To Bill?: No
Lab: [id number]
Lab Facility:

## 2023-04-12 NOTE — TELEPHONE ENCOUNTER
"----- Message from Malou Pretty sent at 8/14/2021  6:22 PM PDT -----  Regarding: Referral to [facility] re hernia  Hello --regarding referral dated Aug. 6:  [facility] did not receive, and MUST have it FAXED to them before assigning a surgeon.   I spoke with the \"\" On Thursday, 8/12 who sent me to Dr. Naty Blackmon's assistant to schedule an appointment.  I am \"her established patient\" due to emergency laparoscopic surgery in Apr. 2017.   Her assistant, Rosy replied on Fri 8/13, stating that if the surgical reference was not related to the diastasis erecti shown in my records, then Dr. Blackmon would  NOT be doing hernia repair.   Rosy demanded a FAXED referral before further action on the matter.  She would then process the referral to two others in her scheduling authority to REVIEW whether either of them would be willing/able to accept the referral.  Needless to say, I'm a little leery of the [id number] at [facility].   Other post-surgical factors (too numerous to list here) dictate against accepting any more.     Perhaps a referral elsewhere, like [facility], would be advisable?  Thank you,  Malou Pretty  " Spray Paint Text: The liquid nitrogen was applied to the skin utilizing a spray paint frosting technique. Render Post-Care Instructions In Note?: yes Medical Necessity Information: It is in your best interest to select a reason for this procedure from the list below. All of these items fulfill various CMS LCD requirements except the new and changing color options. Number Of Freeze-Thaw Cycles: 2 freeze-thaw cycles Render Note In Bullet Format When Appropriate: No Post-Care Instructions: I reviewed with the patient in detail post-care instructions. Patient is to wear sunprotection, and avoid picking at any of the treated lesions. Pt may apply Vaseline to crusted or scabbing areas. Consent: The patient's consent was obtained including but not limited to risks of crusting, scabbing, blistering, scarring, darker or lighter pigmentary change, recurrence, incomplete removal and infection. Duration Of Freeze Thaw-Cycle (Seconds): 5-10 Detail Level: Detailed

## 2024-02-12 ENCOUNTER — APPOINTMENT (RX ONLY)
Dept: URBAN - METROPOLITAN AREA CLINIC 6 | Facility: CLINIC | Age: 74
Setting detail: DERMATOLOGY
End: 2024-02-12

## 2024-02-12 DIAGNOSIS — D18.0 HEMANGIOMA: ICD-10-CM

## 2024-02-12 DIAGNOSIS — L57.0 ACTINIC KERATOSIS: ICD-10-CM

## 2024-02-12 DIAGNOSIS — L82.1 OTHER SEBORRHEIC KERATOSIS: ICD-10-CM

## 2024-02-12 DIAGNOSIS — L81.4 OTHER MELANIN HYPERPIGMENTATION: ICD-10-CM

## 2024-02-12 DIAGNOSIS — Z71.89 OTHER SPECIFIED COUNSELING: ICD-10-CM

## 2024-02-12 DIAGNOSIS — D22 MELANOCYTIC NEVI: ICD-10-CM

## 2024-02-12 PROBLEM — D18.01 HEMANGIOMA OF SKIN AND SUBCUTANEOUS TISSUE: Status: ACTIVE | Noted: 2024-02-12

## 2024-02-12 PROBLEM — D22.5 MELANOCYTIC NEVI OF TRUNK: Status: ACTIVE | Noted: 2024-02-12

## 2024-02-12 PROCEDURE — 99213 OFFICE O/P EST LOW 20 MIN: CPT | Mod: 25

## 2024-02-12 PROCEDURE — 17003 DESTRUCT PREMALG LES 2-14: CPT

## 2024-02-12 PROCEDURE — 17000 DESTRUCT PREMALG LESION: CPT

## 2024-02-12 PROCEDURE — ? LIQUID NITROGEN

## 2024-02-12 PROCEDURE — ? SUNSCREEN TREATMENT REGIMEN

## 2024-02-12 PROCEDURE — ? SUNSCREEN RECOMMENDATIONS

## 2024-02-12 PROCEDURE — ? COUNSELING

## 2024-02-12 ASSESSMENT — LOCATION SIMPLE DESCRIPTION DERM
LOCATION SIMPLE: RIGHT HAND
LOCATION SIMPLE: RIGHT BREAST
LOCATION SIMPLE: LEFT CHEEK
LOCATION SIMPLE: LEFT HAND
LOCATION SIMPLE: NOSE
LOCATION SIMPLE: ABDOMEN
LOCATION SIMPLE: CHEST

## 2024-02-12 ASSESSMENT — LOCATION DETAILED DESCRIPTION DERM
LOCATION DETAILED: EPIGASTRIC SKIN
LOCATION DETAILED: RIGHT MEDIAL SUPERIOR CHEST
LOCATION DETAILED: NASAL DORSUM
LOCATION DETAILED: LEFT RADIAL DORSAL HAND
LOCATION DETAILED: RIGHT RADIAL DORSAL HAND
LOCATION DETAILED: LEFT INFERIOR MEDIAL MALAR CHEEK
LOCATION DETAILED: LEFT INFERIOR CENTRAL MALAR CHEEK
LOCATION DETAILED: LEFT CENTRAL BUCCAL CHEEK
LOCATION DETAILED: RIGHT ULNAR DORSAL HAND
LOCATION DETAILED: LEFT ULNAR DORSAL HAND
LOCATION DETAILED: PERIUMBILICAL SKIN
LOCATION DETAILED: RIGHT MEDIAL BREAST 4-5:00 REGION

## 2024-02-12 ASSESSMENT — LOCATION ZONE DERM
LOCATION ZONE: NOSE
LOCATION ZONE: HAND
LOCATION ZONE: FACE
LOCATION ZONE: TRUNK

## 2024-02-12 NOTE — PROCEDURE: LIQUID NITROGEN
Render Post-Care Instructions In Note?: no
Number Of Freeze-Thaw Cycles: 2 freeze-thaw cycles
Show Applicator Variable?: Yes
Consent: The patient's consent was obtained including but not limited to risks of crusting, scabbing, blistering, scarring, darker or lighter pigmentary change, recurrence, incomplete removal and infection.
Detail Level: Detailed
Duration Of Freeze Thaw-Cycle (Seconds): 10
Post-Care Instructions: I reviewed with the patient in detail post-care instructions. Patient is to wear sunprotection, and avoid picking at any of the treated lesions. Pt may apply Vaseline to crusted or scabbing areas.

## 2024-09-04 ENCOUNTER — APPOINTMENT (RX ONLY)
Dept: URBAN - METROPOLITAN AREA CLINIC 6 | Facility: CLINIC | Age: 74
Setting detail: DERMATOLOGY
End: 2024-09-04

## 2024-09-04 DIAGNOSIS — L57.0 ACTINIC KERATOSIS: ICD-10-CM

## 2024-09-04 DIAGNOSIS — L82.1 OTHER SEBORRHEIC KERATOSIS: ICD-10-CM

## 2024-09-04 DIAGNOSIS — Z71.89 OTHER SPECIFIED COUNSELING: ICD-10-CM

## 2024-09-04 DIAGNOSIS — L82.0 INFLAMED SEBORRHEIC KERATOSIS: ICD-10-CM

## 2024-09-04 PROCEDURE — ? RECOMMENDATIONS

## 2024-09-04 PROCEDURE — ? LIQUID NITROGEN

## 2024-09-04 PROCEDURE — ? COUNSELING

## 2024-09-04 PROCEDURE — 99212 OFFICE O/P EST SF 10 MIN: CPT | Mod: 25

## 2024-09-04 PROCEDURE — ? SUNSCREEN RECOMMENDATIONS

## 2024-09-04 PROCEDURE — 17003 DESTRUCT PREMALG LES 2-14: CPT | Mod: 59

## 2024-09-04 PROCEDURE — 17000 DESTRUCT PREMALG LESION: CPT | Mod: 59

## 2024-09-04 PROCEDURE — 17110 DESTRUCTION B9 LES UP TO 14: CPT

## 2024-09-04 ASSESSMENT — LOCATION ZONE DERM
LOCATION ZONE: TRUNK
LOCATION ZONE: ARM
LOCATION ZONE: HAND
LOCATION ZONE: LIP

## 2024-09-04 ASSESSMENT — LOCATION DETAILED DESCRIPTION DERM
LOCATION DETAILED: RIGHT UPPER CUTANEOUS LIP
LOCATION DETAILED: RIGHT ANTERIOR SHOULDER
LOCATION DETAILED: LEFT UPPER CUTANEOUS LIP
LOCATION DETAILED: LEFT LATERAL BREAST 4-5:00 REGION
LOCATION DETAILED: RIGHT RADIAL DORSAL HAND
LOCATION DETAILED: RIGHT DISTAL DORSAL FOREARM
LOCATION DETAILED: RIGHT INFERIOR MEDIAL UPPER BACK
LOCATION DETAILED: RIGHT POSTERIOR SHOULDER

## 2024-09-04 ASSESSMENT — LOCATION SIMPLE DESCRIPTION DERM
LOCATION SIMPLE: LEFT BREAST
LOCATION SIMPLE: RIGHT UPPER BACK
LOCATION SIMPLE: LEFT LIP
LOCATION SIMPLE: RIGHT LIP
LOCATION SIMPLE: RIGHT HAND
LOCATION SIMPLE: RIGHT FOREARM
LOCATION SIMPLE: RIGHT SHOULDER

## 2024-09-04 NOTE — PROCEDURE: RECOMMENDATIONS
Recommendation Preamble: The following recommendations were made during the visit: CeraVe anti-itch lotion (sample provided)
Detail Level: Zone
Render Risk Assessment In Note?: no

## 2024-09-04 NOTE — PROCEDURE: LIQUID NITROGEN
Number Of Freeze-Thaw Cycles: 3 freeze-thaw cycles
Render Note In Bullet Format When Appropriate: No
Duration Of Freeze Thaw-Cycle (Seconds): 3
Detail Level: Detailed
Show Applicator Variable?: Yes
Post-Care Instructions: I reviewed with the patient in detail post-care instructions. Patient is to wear sunprotection, and avoid picking at any of the treated lesions. Pt may apply Vaseline to crusted or scabbing areas.
Consent: The patient's consent was obtained including but not limited to risks of crusting, scabbing, blistering, scarring, darker or lighter pigmentary change, recurrence, incomplete removal and infection.
Medical Necessity Clause: This procedure was medically necessary because the lesions that were treated were:
Medical Necessity Information: It is in your best interest to select a reason for this procedure from the list below. All of these items fulfill various CMS LCD requirements except the new and changing color options.
Detail Level: Zone
Duration Of Freeze Thaw-Cycle (Seconds): 0
Spray Paint Text: The liquid nitrogen was applied to the skin utilizing a spray paint frosting technique.

## 2024-10-06 ENCOUNTER — OFFICE VISIT (OUTPATIENT)
Dept: URGENT CARE | Facility: PHYSICIAN GROUP | Age: 74
End: 2024-10-06

## 2024-10-06 VITALS
RESPIRATION RATE: 18 BRPM | WEIGHT: 145 LBS | SYSTOLIC BLOOD PRESSURE: 114 MMHG | HEART RATE: 90 BPM | TEMPERATURE: 98.7 F | HEIGHT: 70 IN | OXYGEN SATURATION: 95 % | BODY MASS INDEX: 20.76 KG/M2 | DIASTOLIC BLOOD PRESSURE: 56 MMHG

## 2024-10-06 DIAGNOSIS — S81.859A DOG BITE OF CALF, INITIAL ENCOUNTER: ICD-10-CM
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
## 2024-10-06 PROCEDURE — 12002 RPR S/N/AX/GEN/TRNK2.6-7.5CM: CPT | Performed by: FAMILY MEDICINE

## 2024-12-02 ENCOUNTER — APPOINTMENT (OUTPATIENT)
Dept: URBAN - METROPOLITAN AREA CLINIC 6 | Facility: CLINIC | Age: 74
Setting detail: DERMATOLOGY
End: 2024-12-02

## 2024-12-02 PROBLEM — D48.5 NEOPLASM OF UNCERTAIN BEHAVIOR OF SKIN: Status: ACTIVE | Noted: 2024-12-02

## 2024-12-02 PROCEDURE — 11102 TANGNTL BX SKIN SINGLE LES: CPT

## 2024-12-02 PROCEDURE — ? BIOPSY BY SHAVE METHOD

## 2024-12-31 ENCOUNTER — APPOINTMENT (OUTPATIENT)
Dept: URBAN - METROPOLITAN AREA CLINIC 6 | Facility: CLINIC | Age: 74
Setting detail: DERMATOLOGY
End: 2024-12-31

## 2024-12-31 PROBLEM — C44.629 SQUAMOUS CELL CARCINOMA OF SKIN OF LEFT UPPER LIMB, INCLUDING SHOULDER: Status: ACTIVE | Noted: 2024-12-31

## 2024-12-31 PROCEDURE — 12032 INTMD RPR S/A/T/EXT 2.6-7.5: CPT

## 2024-12-31 PROCEDURE — ? EXCISION

## 2024-12-31 PROCEDURE — 11602 EXC TR-EXT MAL+MARG 1.1-2 CM: CPT

## 2024-12-31 NOTE — PROCEDURE: EXCISION
Surgeon (Optional): Dr. Laura Guzman
Biopsy Photograph Reviewed: Yes
Accession #: [id number]
Date Of Previous Biopsy (Optional): 12/02/2024
Size Of Lesion In Cm: 0.6
X Size Of Lesion In Cm (Optional): 0.5
Size Of Margin In Cm: 0.4
Anesthesia Volume In Cc: 6
Was An Eye Clamp Used?: No
Eye Clamp Note Details: An eye clamp was used during the procedure.
Excision Method: Elliptical
Saucerization Depth: dermis and superficial adipose tissue
Repair Type: Intermediate
Intermediate / Complex Repair - Final Wound Length In Cm: 3.5
Suturegard Retention Suture: 2-0 Nylon
Retention Suture Bite Size: 3 mm
Length To Time In Minutes Device Was In Place: 10
Number Of Hemigard Strips Per Side: 1
Undermining Type: Entire Wound
Debridement Text: The wound edges were debrided prior to proceeding with the closure to facilitate wound healing.
Helical Rim Text: The closure involved the helical rim.
Vermilion Border Text: The closure involved the vermilion border.
Nostril Rim Text: The closure involved the nostril rim.
Retention Suture Text: Retention sutures were placed to support the closure and prevent dehiscence.
Primary Defect Length (In Cm): 0
Lab: [id number]
Lab Facility: 
Graft Donor Site Bandage (Optional-Leave Blank If You Don't Want In Note): Steri-strips and a pressure bandage were applied to the donor site.
Epidermal Closure Graft Donor Site (Optional): simple interrupted
Billing Type: Third-Party Bill
Excision Depth: adipose tissue
Scalpel Size: 15 blade
Anesthesia Type: 1% lidocaine with epinephrine and a 1:10 solution of 8.4% sodium bicarbonate
Hemostasis: Electrodesiccation
Estimated Blood Loss (Cc): minimal
Detail Level: Detailed
Repair Depth: use same depth as excision depth
Deep Sutures: 4-0 Monocryl
Dermal Closure: buried vertical mattress
Epidermal Sutures: 5-0 Vicryl Rapide
Epidermal Closure: running subcuticular
Wound Care: Vaseline
Dressing: pressure dressing
Suturegard Intro: Intraoperative tissue expansion was performed, utilizing the SUTUREGARD device, in order to reduce wound tension.
Suturegard Body: The suture ends were repeatedly re-tightened and re-clamped to achieve the desired tissue expansion.
Hemigard Intro: Due to skin fragility and wound tension, it was decided to use HEMIGARD adhesive retention suture devices to permit a linear closure. The skin was cleaned and dried for a 6cm distance away from the wound. Excessive hair, if present, was removed to allow for adhesion.
Hemigard Postcare Instructions: The HEMIGARD strips are to remain completely dry for at least 5-7 days.
Positioning (Leave Blank If You Do Not Want): The patient was placed in a comfortable position exposing the surgical site.
Pre-Excision Curettage Text (Leave Blank If You Do Not Want): Prior to drawing the surgical margin the visible lesion was removed with electrodesiccation and curettage to clearly define the lesion size.
Complex Repair Preamble Text (Leave Blank If You Do Not Want): Extensive wide undermining was performed.
Intermediate Repair Preamble Text (Leave Blank If You Do Not Want): Undermining was performed with blunt dissection.
Curvilinear Excision Additional Text (Leave Blank If You Do Not Want): The margin was drawn around the clinically apparent lesion.  A curvilinear shape was then drawn on the skin incorporating the lesion and margins.  Incisions were then made along these lines to the appropriate tissue plane and the lesion was extirpated.
Fusiform Excision Additional Text (Leave Blank If You Do Not Want): The margin was drawn around the clinically apparent lesion.  A fusiform shape was then drawn on the skin incorporating the lesion and margins.  Incisions were then made along these lines to the appropriate tissue plane and the lesion was extirpated.
Elliptical Excision Additional Text (Leave Blank If You Do Not Want): The margin was drawn around the clinically apparent lesion.  An elliptical shape was then drawn on the skin incorporating the lesion and margins.  Incisions were then made along these lines to the appropriate tissue plane and the lesion was extirpated.
Saucerization Excision Additional Text (Leave Blank If You Do Not Want): The margin was drawn around the clinically apparent lesion.  Incisions were then made along these lines, in a tangential fashion, to the appropriate tissue plane and the lesion was extirpated.
Slit Excision Additional Text (Leave Blank If You Do Not Want): A linear line was drawn on the skin overlying the lesion. An incision was made slowly until the lesion was visualized.  Once visualized, the lesion was removed with blunt dissection.
Excisional Biopsy Additional Text (Leave Blank If You Do Not Want): The margin was drawn around the clinically apparent lesion. An elliptical shape was then drawn on the skin incorporating the lesion and margins.  Incisions were then made along these lines to the appropriate tissue plane and the lesion was extirpated.
Perilesional Excision Additional Text (Leave Blank If You Do Not Want): The margin was drawn around the clinically apparent lesion. Incisions were then made along these lines to the appropriate tissue plane and the lesion was extirpated.
Repair Performed By Another Provider Text (Leave Blank If You Do Not Want): After the tissue was excised the defect was repaired by another provider.
No Repair - Repaired With Adjacent Surgical Defect Text (Leave Blank If You Do Not Want): After the excision the defect was repaired concurrently with another surgical defect which was in close approximation.
Adjacent Tissue Transfer Text: The defect edges were debeveled with a #15 scalpel blade. Given the location of the defect and the proximity to free margins an adjacent tissue transfer was deemed most appropriate. Using a sterile surgical marker, an appropriate flap was drawn incorporating the defect and placing the expected incisions within the relaxed skin tension lines where possible. The area thus outlined was incised deep to adipose tissue with a #15 scalpel blade. The skin margins were undermined to an appropriate distance in all directions utilizing iris scissors and carried over to close the primary defect.
Advancement Flap (Single) Text: The defect edges were debeveled with a #15 scalpel blade.  Given the location of the defect and the proximity to free margins a single advancement flap was deemed most appropriate.  Using a sterile surgical marker, an appropriate advancement flap was drawn incorporating the defect and placing the expected incisions within the relaxed skin tension lines where possible.    The area thus outlined was incised deep to adipose tissue with a #15 scalpel blade.  The skin margins were undermined to an appropriate distance in all directions utilizing iris scissors.
Advancement Flap (Double) Text: The defect edges were debeveled with a #15 scalpel blade.  Given the location of the defect and the proximity to free margins a double advancement flap was deemed most appropriate.  Using a sterile surgical marker, the appropriate advancement flaps were drawn incorporating the defect and placing the expected incisions within the relaxed skin tension lines where possible.    The area thus outlined was incised deep to adipose tissue with a #15 scalpel blade.  The skin margins were undermined to an appropriate distance in all directions utilizing iris scissors.
Burow's Advancement Flap Text: The defect edges were debeveled with a #15 scalpel blade.  Given the location of the defect and the proximity to free margins a Burow's advancement flap was deemed most appropriate.  Using a sterile surgical marker, the appropriate advancement flap was drawn incorporating the defect and placing the expected incisions within the relaxed skin tension lines where possible.    The area thus outlined was incised deep to adipose tissue with a #15 scalpel blade.  The skin margins were undermined to an appropriate distance in all directions utilizing iris scissors.
Chonodrocutaneous Helical Advancement Flap Text: The defect edges were debeveled with a #15 scalpel blade.  Given the location of the defect and the proximity to free margins a chondrocutaneous helical advancement flap was deemed most appropriate.  Using a sterile surgical marker, the appropriate advancement flap was drawn incorporating the defect and placing the expected incisions within the relaxed skin tension lines where possible.    The area thus outlined was incised deep to adipose tissue with a #15 scalpel blade.  The skin margins were undermined to an appropriate distance in all directions utilizing iris scissors.
Crescentic Advancement Flap Text: The defect edges were debeveled with a #15 scalpel blade.  Given the location of the defect and the proximity to free margins a crescentic advancement flap was deemed most appropriate.  Using a sterile surgical marker, the appropriate advancement flap was drawn incorporating the defect and placing the expected incisions within the relaxed skin tension lines where possible.    The area thus outlined was incised deep to adipose tissue with a #15 scalpel blade.  The skin margins were undermined to an appropriate distance in all directions utilizing iris scissors.
A-T Advancement Flap Text: The defect edges were debeveled with a #15 scalpel blade.  Given the location of the defect, shape of the defect and the proximity to free margins an A-T advancement flap was deemed most appropriate.  Using a sterile surgical marker, an appropriate advancement flap was drawn incorporating the defect and placing the expected incisions within the relaxed skin tension lines where possible.    The area thus outlined was incised deep to adipose tissue with a #15 scalpel blade.  The skin margins were undermined to an appropriate distance in all directions utilizing iris scissors.
O-T Advancement Flap Text: The defect edges were debeveled with a #15 scalpel blade.  Given the location of the defect, shape of the defect and the proximity to free margins an O-T advancement flap was deemed most appropriate.  Using a sterile surgical marker, an appropriate advancement flap was drawn incorporating the defect and placing the expected incisions within the relaxed skin tension lines where possible.    The area thus outlined was incised deep to adipose tissue with a #15 scalpel blade.  The skin margins were undermined to an appropriate distance in all directions utilizing iris scissors.
O-L Flap Text: The defect edges were debeveled with a #15 scalpel blade.  Given the location of the defect, shape of the defect and the proximity to free margins an O-L flap was deemed most appropriate.  Using a sterile surgical marker, an appropriate advancement flap was drawn incorporating the defect and placing the expected incisions within the relaxed skin tension lines where possible.    The area thus outlined was incised deep to adipose tissue with a #15 scalpel blade.  The skin margins were undermined to an appropriate distance in all directions utilizing iris scissors.
O-Z Flap Text: The defect edges were debeveled with a #15 scalpel blade.  Given the location of the defect, shape of the defect and the proximity to free margins an O-Z flap was deemed most appropriate.  Using a sterile surgical marker, an appropriate transposition flap was drawn incorporating the defect and placing the expected incisions within the relaxed skin tension lines where possible. The area thus outlined was incised deep to adipose tissue with a #15 scalpel blade.  The skin margins were undermined to an appropriate distance in all directions utilizing iris scissors.
Double O-Z Flap Text: The defect edges were debeveled with a #15 scalpel blade.  Given the location of the defect, shape of the defect and the proximity to free margins a Double O-Z flap was deemed most appropriate.  Using a sterile surgical marker, an appropriate transposition flap was drawn incorporating the defect and placing the expected incisions within the relaxed skin tension lines where possible. The area thus outlined was incised deep to adipose tissue with a #15 scalpel blade.  The skin margins were undermined to an appropriate distance in all directions utilizing iris scissors.
V-Y Flap Text: The defect edges were debeveled with a #15 scalpel blade.  Given the location of the defect, shape of the defect and the proximity to free margins a V-Y flap was deemed most appropriate.  Using a sterile surgical marker, an appropriate advancement flap was drawn incorporating the defect and placing the expected incisions within the relaxed skin tension lines where possible.    The area thus outlined was incised deep to adipose tissue with a #15 scalpel blade.  The skin margins were undermined to an appropriate distance in all directions utilizing iris scissors.
Advancement-Rotation Flap Text: The defect edges were debeveled with a #15 scalpel blade.  Given the location of the defect, shape of the defect and the proximity to free margins an advancement-rotation flap was deemed most appropriate.  Using a sterile surgical marker, an appropriate flap was drawn incorporating the defect and placing the expected incisions within the relaxed skin tension lines where possible. The area thus outlined was incised deep to adipose tissue with a #15 scalpel blade.  The skin margins were undermined to an appropriate distance in all directions utilizing iris scissors.
Mercedes Flap Text: The defect edges were debeveled with a #15 scalpel blade.  Given the location of the defect, shape of the defect and the proximity to free margins a Mercedes flap was deemed most appropriate.  Using a sterile surgical marker, an appropriate advancement flap was drawn incorporating the defect and placing the expected incisions within the relaxed skin tension lines where possible. The area thus outlined was incised deep to adipose tissue with a #15 scalpel blade.  The skin margins were undermined to an appropriate distance in all directions utilizing iris scissors.
Modified Advancement Flap Text: The defect edges were debeveled with a #15 scalpel blade.  Given the location of the defect, shape of the defect and the proximity to free margins a modified advancement flap was deemed most appropriate.  Using a sterile surgical marker, an appropriate advancement flap was drawn incorporating the defect and placing the expected incisions within the relaxed skin tension lines where possible.    The area thus outlined was incised deep to adipose tissue with a #15 scalpel blade.  The skin margins were undermined to an appropriate distance in all directions utilizing iris scissors.
Mucosal Advancement Flap Text: Given the location of the defect, shape of the defect and the proximity to free margins a mucosal advancement flap was deemed most appropriate. Incisions were made with a 15 blade scalpel in the appropriate fashion along the cutaneous vermilion border and the mucosal lip. The remaining actinically damaged mucosal tissue was excised.  The mucosal advancement flap was then elevated to the gingival sulcus with care taken to preserve the neurovascular structures and advanced into the primary defect. Care was taken to ensure that precise realignment of the vermilion border was achieved.
Peng Advancement Flap Text: The defect edges were debeveled with a #15 scalpel blade.  Given the location of the defect, shape of the defect and the proximity to free margins a Peng advancement flap was deemed most appropriate.  Using a sterile surgical marker, an appropriate advancement flap was drawn incorporating the defect and placing the expected incisions within the relaxed skin tension lines where possible. The area thus outlined was incised deep to adipose tissue with a #15 scalpel blade.  The skin margins were undermined to an appropriate distance in all directions utilizing iris scissors.
Hatchet Flap Text: The defect edges were debeveled with a #15 scalpel blade.  Given the location of the defect, shape of the defect and the proximity to free margins a hatchet flap was deemed most appropriate.  Using a sterile surgical marker, an appropriate hatchet flap was drawn incorporating the defect and placing the expected incisions within the relaxed skin tension lines where possible.    The area thus outlined was incised deep to adipose tissue with a #15 scalpel blade.  The skin margins were undermined to an appropriate distance in all directions utilizing iris scissors.
Rotation Flap Text: The defect edges were debeveled with a #15 scalpel blade.  Given the location of the defect, shape of the defect and the proximity to free margins a rotation flap was deemed most appropriate.  Using a sterile surgical marker, an appropriate rotation flap was drawn incorporating the defect and placing the expected incisions within the relaxed skin tension lines where possible.    The area thus outlined was incised deep to adipose tissue with a #15 scalpel blade.  The skin margins were undermined to an appropriate distance in all directions utilizing iris scissors.
Bilateral Rotation Flap Text: The defect edges were debeveled with a #15 scalpel blade. Given the location of the defect, shape of the defect and the proximity to free margins a bilateral rotation flap was deemed most appropriate. Using a sterile surgical marker, an appropriate rotation flap was drawn incorporating the defect and placing the expected incisions within the relaxed skin tension lines where possible. The area thus outlined was incised deep to adipose tissue with a #15 scalpel blade. The skin margins were undermined to an appropriate distance in all directions utilizing iris scissors. Following this, the designed flap was carried over into the primary defect and sutured into place.
Spiral Flap Text: The defect edges were debeveled with a #15 scalpel blade.  Given the location of the defect, shape of the defect and the proximity to free margins a spiral flap was deemed most appropriate.  Using a sterile surgical marker, an appropriate rotation flap was drawn incorporating the defect and placing the expected incisions within the relaxed skin tension lines where possible. The area thus outlined was incised deep to adipose tissue with a #15 scalpel blade.  The skin margins were undermined to an appropriate distance in all directions utilizing iris scissors.
Staged Advancement Flap Text: The defect edges were debeveled with a #15 scalpel blade.  Given the location of the defect, shape of the defect and the proximity to free margins a staged advancement flap was deemed most appropriate.  Using a sterile surgical marker, an appropriate advancement flap was drawn incorporating the defect and placing the expected incisions within the relaxed skin tension lines where possible. The area thus outlined was incised deep to adipose tissue with a #15 scalpel blade.  The skin margins were undermined to an appropriate distance in all directions utilizing iris scissors.
Star Wedge Flap Text: The defect edges were debeveled with a #15 scalpel blade.  Given the location of the defect, shape of the defect and the proximity to free margins a star wedge flap was deemed most appropriate.  Using a sterile surgical marker, an appropriate rotation flap was drawn incorporating the defect and placing the expected incisions within the relaxed skin tension lines where possible. The area thus outlined was incised deep to adipose tissue with a #15 scalpel blade.  The skin margins were undermined to an appropriate distance in all directions utilizing iris scissors.
Transposition Flap Text: The defect edges were debeveled with a #15 scalpel blade.  Given the location of the defect and the proximity to free margins a transposition flap was deemed most appropriate.  Using a sterile surgical marker, an appropriate transposition flap was drawn incorporating the defect.    The area thus outlined was incised deep to adipose tissue with a #15 scalpel blade.  The skin margins were undermined to an appropriate distance in all directions utilizing iris scissors.
Muscle Hinge Flap Text: The defect edges were debeveled with a #15 scalpel blade.  Given the size, depth and location of the defect and the proximity to free margins a muscle hinge flap was deemed most appropriate.  Using a sterile surgical marker, an appropriate hinge flap was drawn incorporating the defect. The area thus outlined was incised with a #15 scalpel blade.  The skin margins were undermined to an appropriate distance in all directions utilizing iris scissors.
Mustarde Flap Text: The defect edges were debeveled with a #15 scalpel blade.  Given the size, depth and location of the defect and the proximity to free margins a Mustarde flap was deemed most appropriate. Using a sterile surgical marker, an appropriate flap was drawn incorporating the defect. The area thus outlined was incised with a #15 scalpel blade. The skin margins were undermined to an appropriate distance in all directions utilizing iris scissors. Following this, the designed flap was carried into the primary defect and sutured into place.
Nasal Turnover Hinge Flap Text: The defect edges were debeveled with a #15 scalpel blade.  Given the size, depth, location of the defect and the defect being full thickness a nasal turnover hinge flap was deemed most appropriate.  Using a sterile surgical marker, an appropriate hinge flap was drawn incorporating the defect. The area thus outlined was incised with a #15 scalpel blade. The flap was designed to recreate the nasal mucosal lining and the alar rim. The skin margins were undermined to an appropriate distance in all directions utilizing iris scissors.
Nasalis-Muscle-Based Myocutaneous Island Pedicle Flap Text: Using a #15 blade, an incision was made around the donor flap to the level of the nasalis muscle. Wide lateral undermining was then performed in both the subcutaneous plane above the nasalis muscle, and in a submuscular plane just above periosteum. This allowed the formation of a free nasalis muscle axial pedicle (based on the angular artery) which was still attached to the actual cutaneous flap, increasing its mobility and vascular viability. Hemostasis was obtained with pinpoint electrocoagulation. The flap was mobilized into position and the pivotal anchor points positioned and stabilized with buried interrupted sutures. Subcutaneous and dermal tissues were closed in a multilayered fashion with sutures. Tissue redundancies were excised, and the epidermal edges were apposed without significant tension and sutured with sutures.
Nasalis Myocutaneous Flap Text: Using a #15 blade, an incision was made around the donor flap to the level of the nasalis muscle. Wide lateral undermining was then performed in both the subcutaneous plane above the nasalis muscle, and in a submuscular plane just above periosteum. This allowed the formation of a free nasalis muscle axial pedicle which was still attached to the actual cutaneous flap, increasing its mobility and vascular viability. Hemostasis was obtained with pinpoint electrocoagulation. The flap was mobilized into position and the pivotal anchor points positioned and stabilized with buried interrupted sutures. Subcutaneous and dermal tissues were closed in a multilayered fashion with sutures. Tissue redundancies were excised, and the epidermal edges were apposed without significant tension and sutured with sutures.
Nasolabial Transposition Flap Text: The defect edges were debeveled with a #15 scalpel blade.  Given the size, depth and location of the defect and the proximity to free margins a nasolabial transposition flap was deemed most appropriate. Using a sterile surgical marker, an appropriate flap was drawn incorporating the defect. The area thus outlined was incised with a #15 scalpel blade. The skin margins were undermined to an appropriate distance in all directions utilizing iris scissors. Following this, the designed flap was carried into the primary defect and sutured into place.
Orbicularis Oris Muscle Flap Text: The defect edges were debeveled with a #15 scalpel blade.  Given that the defect affected the competency of the oral sphincter an obicularis oris muscle flap was deemed most appropriate to restore this competency and normal muscle function.  Using a sterile surgical marker, an appropriate flap was drawn incorporating the defect. The area thus outlined was incised with a #15 scalpel blade.
Melolabial Transposition Flap Text: The defect edges were debeveled with a #15 scalpel blade.  Given the location of the defect and the proximity to free margins a melolabial flap was deemed most appropriate.  Using a sterile surgical marker, an appropriate melolabial transposition flap was drawn incorporating the defect.    The area thus outlined was incised deep to adipose tissue with a #15 scalpel blade.  The skin margins were undermined to an appropriate distance in all directions utilizing iris scissors.
Rectangular Flap Text: The defect edges were debeveled with a #15 scalpel blade. Given the location of the defect and the proximity to free margins a rectangular flap was deemed most appropriate. Using a sterile surgical marker, an appropriate rectangular flap was drawn incorporating the defect. The area thus outlined was incised deep to adipose tissue with a #15 scalpel blade. The skin margins were undermined to an appropriate distance in all directions utilizing iris scissors. Following this, the designed flap was carried over into the primary defect and sutured into place.
Rhombic Flap Text: The defect edges were debeveled with a #15 scalpel blade.  Given the location of the defect and the proximity to free margins a rhombic flap was deemed most appropriate.  Using a sterile surgical marker, an appropriate rhombic flap was drawn incorporating the defect.    The area thus outlined was incised deep to adipose tissue with a #15 scalpel blade.  The skin margins were undermined to an appropriate distance in all directions utilizing iris scissors.
Rhomboid Transposition Flap Text: The defect edges were debeveled with a #15 scalpel blade.  Given the location of the defect and the proximity to free margins a rhomboid transposition flap was deemed most appropriate.  Using a sterile surgical marker, an appropriate rhomboid flap was drawn incorporating the defect.    The area thus outlined was incised deep to adipose tissue with a #15 scalpel blade.  The skin margins were undermined to an appropriate distance in all directions utilizing iris scissors.
Bi-Rhombic Flap Text: The defect edges were debeveled with a #15 scalpel blade.  Given the location of the defect and the proximity to free margins a bi-rhombic flap was deemed most appropriate.  Using a sterile surgical marker, an appropriate rhombic flap was drawn incorporating the defect. The area thus outlined was incised deep to adipose tissue with a #15 scalpel blade.  The skin margins were undermined to an appropriate distance in all directions utilizing iris scissors.
Helical Rim Advancement Flap Text: The defect edges were debeveled with a #15 blade scalpel.  Given the location of the defect and the proximity to free margins (helical rim) a double helical rim advancement flap was deemed most appropriate.  Using a sterile surgical marker, the appropriate advancement flaps were drawn incorporating the defect and placing the expected incisions between the helical rim and antihelix where possible.  The area thus outlined was incised through and through with a #15 scalpel blade.  With a skin hook and iris scissors, the flaps were gently and sharply undermined and freed up.
Bilateral Helical Rim Advancement Flap Text: The defect edges were debeveled with a #15 blade scalpel.  Given the location of the defect and the proximity to free margins (helical rim) a bilateral helical rim advancement flap was deemed most appropriate.  Using a sterile surgical marker, the appropriate advancement flaps were drawn incorporating the defect and placing the expected incisions between the helical rim and antihelix where possible.  The area thus outlined was incised through and through with a #15 scalpel blade.  With a skin hook and iris scissors, the flaps were gently and sharply undermined and freed up.
Ear Star Wedge Flap Text: The defect edges were debeveled with a #15 blade scalpel.  Given the location of the defect and the proximity to free margins (helical rim) an ear star wedge flap was deemed most appropriate.  Using a sterile surgical marker, the appropriate flap was drawn incorporating the defect and placing the expected incisions between the helical rim and antihelix where possible.  The area thus outlined was incised through and through with a #15 scalpel blade.
Flip-Flop Flap Text: The defect edges were debeveled with a #15 blade scalpel.  Given the location of the defect and the proximity to free margins a flip-flop flap was deemed most appropriate. Using a sterile surgical marker, the appropriate flap was drawn incorporating the defect and placing the expected incisions between the helical rim and antihelix where possible.  The area thus outlined was incised through and through with a #15 scalpel blade. Following this, the designed flap was carried over into the primary defect and sutured into place.
Banner Transposition Flap Text: The defect edges were debeveled with a #15 scalpel blade.  Given the location of the defect and the proximity to free margins a Banner transposition flap was deemed most appropriate.  Using a sterile surgical marker, an appropriate flap drawn around the defect. The area thus outlined was incised deep to adipose tissue with a #15 scalpel blade.  The skin margins were undermined to an appropriate distance in all directions utilizing iris scissors.
Bilobed Flap Text: The defect edges were debeveled with a #15 scalpel blade.  Given the location of the defect and the proximity to free margins a bilobe flap was deemed most appropriate.  Using a sterile surgical marker, an appropriate bilobe flap drawn around the defect.    The area thus outlined was incised deep to adipose tissue with a #15 scalpel blade.  The skin margins were undermined to an appropriate distance in all directions utilizing iris scissors.
Bilobed Transposition Flap Text: The defect edges were debeveled with a #15 scalpel blade.  Given the location of the defect and the proximity to free margins a bilobed transposition flap was deemed most appropriate.  Using a sterile surgical marker, an appropriate bilobe flap drawn around the defect.    The area thus outlined was incised deep to adipose tissue with a #15 scalpel blade.  The skin margins were undermined to an appropriate distance in all directions utilizing iris scissors.
Trilobed Flap Text: The defect edges were debeveled with a #15 scalpel blade.  Given the location of the defect and the proximity to free margins a trilobed flap was deemed most appropriate.  Using a sterile surgical marker, an appropriate trilobed flap drawn around the defect.    The area thus outlined was incised deep to adipose tissue with a #15 scalpel blade.  The skin margins were undermined to an appropriate distance in all directions utilizing iris scissors.
Dorsal Nasal Flap Text: The defect edges were debeveled with a #15 scalpel blade.  Given the location of the defect and the proximity to free margins a dorsal nasal flap was deemed most appropriate.  Using a sterile surgical marker, an appropriate dorsal nasal flap was drawn around the defect.    The area thus outlined was incised deep to adipose tissue with a #15 scalpel blade.  The skin margins were undermined to an appropriate distance in all directions utilizing iris scissors.
Island Pedicle Flap Text: The defect edges were debeveled with a #15 scalpel blade.  Given the location of the defect, shape of the defect and the proximity to free margins an island pedicle advancement flap was deemed most appropriate.  Using a sterile surgical marker, an appropriate advancement flap was drawn incorporating the defect, outlining the appropriate donor tissue and placing the expected incisions within the relaxed skin tension lines where possible.    The area thus outlined was incised deep to adipose tissue with a #15 scalpel blade.  The skin margins were undermined to an appropriate distance in all directions around the primary defect and laterally outward around the island pedicle utilizing iris scissors.  There was minimal undermining beneath the pedicle flap.
Island Pedicle Flap With Canthal Suspension Text: The defect edges were debeveled with a #15 scalpel blade.  Given the location of the defect, shape of the defect and the proximity to free margins an island pedicle advancement flap was deemed most appropriate.  Using a sterile surgical marker, an appropriate advancement flap was drawn incorporating the defect, outlining the appropriate donor tissue and placing the expected incisions within the relaxed skin tension lines where possible. The area thus outlined was incised deep to adipose tissue with a #15 scalpel blade.  The skin margins were undermined to an appropriate distance in all directions around the primary defect and laterally outward around the island pedicle utilizing iris scissors.  There was minimal undermining beneath the pedicle flap. A suspension suture was placed in the canthal tendon to prevent tension and prevent ectropion.
Alar Island Pedicle Flap Text: The defect edges were debeveled with a #15 scalpel blade.  Given the location of the defect, shape of the defect and the proximity to the alar rim an island pedicle advancement flap was deemed most appropriate.  Using a sterile surgical marker, an appropriate advancement flap was drawn incorporating the defect, outlining the appropriate donor tissue and placing the expected incisions within the nasal ala running parallel to the alar rim. The area thus outlined was incised with a #15 scalpel blade.  The skin margins were undermined minimally to an appropriate distance in all directions around the primary defect and laterally outward around the island pedicle utilizing iris scissors.  There was minimal undermining beneath the pedicle flap.
Double Island Pedicle Flap Text: The defect edges were debeveled with a #15 scalpel blade.  Given the location of the defect, shape of the defect and the proximity to free margins a double island pedicle advancement flap was deemed most appropriate.  Using a sterile surgical marker, an appropriate advancement flap was drawn incorporating the defect, outlining the appropriate donor tissue and placing the expected incisions within the relaxed skin tension lines where possible.    The area thus outlined was incised deep to adipose tissue with a #15 scalpel blade.  The skin margins were undermined to an appropriate distance in all directions around the primary defect and laterally outward around the island pedicle utilizing iris scissors.  There was minimal undermining beneath the pedicle flap.
Island Pedicle Flap-Requiring Vessel Identification Text: The defect edges were debeveled with a #15 scalpel blade.  Given the location of the defect, shape of the defect and the proximity to free margins an island pedicle advancement flap was deemed most appropriate.  Using a sterile surgical marker, an appropriate advancement flap was drawn, based on the axial vessel mentioned above, incorporating the defect, outlining the appropriate donor tissue and placing the expected incisions within the relaxed skin tension lines where possible.    The area thus outlined was incised deep to adipose tissue with a #15 scalpel blade.  The skin margins were undermined to an appropriate distance in all directions around the primary defect and laterally outward around the island pedicle utilizing iris scissors.  There was minimal undermining beneath the pedicle flap.
Keystone Flap Text: The defect edges were debeveled with a #15 scalpel blade.  Given the location of the defect, shape of the defect a keystone flap was deemed most appropriate.  Using a sterile surgical marker, an appropriate keystone flap was drawn incorporating the defect, outlining the appropriate donor tissue and placing the expected incisions within the relaxed skin tension lines where possible. The area thus outlined was incised deep to adipose tissue with a #15 scalpel blade.  The skin margins were undermined to an appropriate distance in all directions around the primary defect and laterally outward around the flap utilizing iris scissors.
O-T Plasty Text: The defect edges were debeveled with a #15 scalpel blade.  Given the location of the defect, shape of the defect and the proximity to free margins an O-T plasty was deemed most appropriate.  Using a sterile surgical marker, an appropriate O-T plasty was drawn incorporating the defect and placing the expected incisions within the relaxed skin tension lines where possible.    The area thus outlined was incised deep to adipose tissue with a #15 scalpel blade.  The skin margins were undermined to an appropriate distance in all directions utilizing iris scissors.
O-Z Plasty Text: The defect edges were debeveled with a #15 scalpel blade.  Given the location of the defect, shape of the defect and the proximity to free margins an O-Z plasty (double transposition flap) was deemed most appropriate.  Using a sterile surgical marker, the appropriate transposition flaps were drawn incorporating the defect and placing the expected incisions within the relaxed skin tension lines where possible.    The area thus outlined was incised deep to adipose tissue with a #15 scalpel blade.  The skin margins were undermined to an appropriate distance in all directions utilizing iris scissors.  Hemostasis was achieved with electrocautery.  The flaps were then transposed into place, one clockwise and the other counterclockwise, and anchored with interrupted buried subcutaneous sutures.
Double O-Z Plasty Text: The defect edges were debeveled with a #15 scalpel blade.  Given the location of the defect, shape of the defect and the proximity to free margins a Double O-Z plasty (double transposition flap) was deemed most appropriate.  Using a sterile surgical marker, the appropriate transposition flaps were drawn incorporating the defect and placing the expected incisions within the relaxed skin tension lines where possible. The area thus outlined was incised deep to adipose tissue with a #15 scalpel blade.  The skin margins were undermined to an appropriate distance in all directions utilizing iris scissors.  Hemostasis was achieved with electrocautery.  The flaps were then transposed into place, one clockwise and the other counterclockwise, and anchored with interrupted buried subcutaneous sutures.
V-Y Plasty Text: The defect edges were debeveled with a #15 scalpel blade.  Given the location of the defect, shape of the defect and the proximity to free margins an V-Y advancement flap was deemed most appropriate.  Using a sterile surgical marker, an appropriate advancement flap was drawn incorporating the defect and placing the expected incisions within the relaxed skin tension lines where possible.    The area thus outlined was incised deep to adipose tissue with a #15 scalpel blade.  The skin margins were undermined to an appropriate distance in all directions utilizing iris scissors.
H Plasty Text: Given the location of the defect, shape of the defect and the proximity to free margins a H-plasty was deemed most appropriate for repair.  Using a sterile surgical marker, the appropriate advancement arms of the H-plasty were drawn incorporating the defect and placing the expected incisions within the relaxed skin tension lines where possible. The area thus outlined was incised deep to adipose tissue with a #15 scalpel blade. The skin margins were undermined to an appropriate distance in all directions utilizing iris scissors.  The opposing advancement arms were then advanced into place in opposite direction and anchored with interrupted buried subcutaneous sutures.
W Plasty Text: The lesion was extirpated to the level of the fat with a #15 scalpel blade.  Given the location of the defect, shape of the defect and the proximity to free margins a W-plasty was deemed most appropriate for repair.  Using a sterile surgical marker, the appropriate transposition arms of the W-plasty were drawn incorporating the defect and placing the expected incisions within the relaxed skin tension lines where possible.    The area thus outlined was incised deep to adipose tissue with a #15 scalpel blade.  The skin margins were undermined to an appropriate distance in all directions utilizing iris scissors.  The opposing transposition arms were then transposed into place in opposite direction and anchored with interrupted buried subcutaneous sutures.
Z Plasty Text: The lesion was extirpated to the level of the fat with a #15 scalpel blade.  Given the location of the defect, shape of the defect and the proximity to free margins a Z-plasty was deemed most appropriate for repair.  Using a sterile surgical marker, the appropriate transposition arms of the Z-plasty were drawn incorporating the defect and placing the expected incisions within the relaxed skin tension lines where possible.    The area thus outlined was incised deep to adipose tissue with a #15 scalpel blade.  The skin margins were undermined to an appropriate distance in all directions utilizing iris scissors.  The opposing transposition arms were then transposed into place in opposite direction and anchored with interrupted buried subcutaneous sutures.
Double Z Plasty Text: The lesion was extirpated to the level of the fat with a #15 scalpel blade. Given the location of the defect, shape of the defect and the proximity to free margins a double Z-plasty was deemed most appropriate for repair. Using a sterile surgical marker, the appropriate transposition arms of the double Z-plasty were drawn incorporating the defect and placing the expected incisions within the relaxed skin tension lines where possible. The area thus outlined was incised deep to adipose tissue with a #15 scalpel blade. The skin margins were undermined to an appropriate distance in all directions utilizing iris scissors. The opposing transposition arms were then transposed and carried over into place in opposite direction and anchored with interrupted buried subcutaneous sutures.
Zygomaticofacial Flap Text: Given the location of the defect, shape of the defect and the proximity to free margins a zygomaticofacial flap was deemed most appropriate for repair.  Using a sterile surgical marker, the appropriate flap was drawn incorporating the defect and placing the expected incisions within the relaxed skin tension lines where possible. The area thus outlined was incised deep to adipose tissue with a #15 scalpel blade with preservation of a vascular pedicle.  The skin margins were undermined to an appropriate distance in all directions utilizing iris scissors.  The flap was then placed into the defect and anchored with interrupted buried subcutaneous sutures.
Cheek Interpolation Flap Text: A decision was made to reconstruct the defect utilizing an interpolation axial flap and a staged reconstruction.  A telfa template was made of the defect.  This telfa template was then used to outline the Cheek Interpolation flap.  The donor area for the pedicle flap was then injected with anesthesia.  The flap was excised through the skin and subcutaneous tissue down to the layer of the underlying musculature.  The interpolation flap was carefully excised within this deep plane to maintain its blood supply.  The edges of the donor site were undermined.   The donor site was closed in a primary fashion.  The pedicle was then rotated into position and sutured.  Once the tube was sutured into place, adequate blood supply was confirmed with blanching and refill.  The pedicle was then wrapped with xeroform gauze and dressed appropriately with a telfa and gauze bandage to ensure continued blood supply and protect the attached pedicle.
Cheek-To-Nose Interpolation Flap Text: A decision was made to reconstruct the defect utilizing an interpolation axial flap and a staged reconstruction.  A telfa template was made of the defect.  This telfa template was then used to outline the Cheek-To-Nose Interpolation flap.  The donor area for the pedicle flap was then injected with anesthesia.  The flap was excised through the skin and subcutaneous tissue down to the layer of the underlying musculature.  The interpolation flap was carefully excised within this deep plane to maintain its blood supply.  The edges of the donor site were undermined.   The donor site was closed in a primary fashion.  The pedicle was then rotated into position and sutured.  Once the tube was sutured into place, adequate blood supply was confirmed with blanching and refill.  The pedicle was then wrapped with xeroform gauze and dressed appropriately with a telfa and gauze bandage to ensure continued blood supply and protect the attached pedicle.
Interpolation Flap Text: A decision was made to reconstruct the defect utilizing an interpolation axial flap and a staged reconstruction.  A telfa template was made of the defect.  This telfa template was then used to outline the interpolation flap.  The donor area for the pedicle flap was then injected with anesthesia.  The flap was excised through the skin and subcutaneous tissue down to the layer of the underlying musculature.  The interpolation flap was carefully excised within this deep plane to maintain its blood supply.  The edges of the donor site were undermined.   The donor site was closed in a primary fashion.  The pedicle was then rotated into position and sutured.  Once the tube was sutured into place, adequate blood supply was confirmed with blanching and refill.  The pedicle was then wrapped with xeroform gauze and dressed appropriately with a telfa and gauze bandage to ensure continued blood supply and protect the attached pedicle.
Melolabial Interpolation Flap Text: A decision was made to reconstruct the defect utilizing an interpolation axial flap and a staged reconstruction.  A telfa template was made of the defect.  This telfa template was then used to outline the melolabial interpolation flap.  The donor area for the pedicle flap was then injected with anesthesia.  The flap was excised through the skin and subcutaneous tissue down to the layer of the underlying musculature.  The pedicle flap was carefully excised within this deep plane to maintain its blood supply.  The edges of the donor site were undermined.   The donor site was closed in a primary fashion.  The pedicle was then rotated into position and sutured.  Once the tube was sutured into place, adequate blood supply was confirmed with blanching and refill.  The pedicle was then wrapped with xeroform gauze and dressed appropriately with a telfa and gauze bandage to ensure continued blood supply and protect the attached pedicle.
Mastoid Interpolation Flap Text: A decision was made to reconstruct the defect utilizing an interpolation axial flap and a staged reconstruction.  A telfa template was made of the defect.  This telfa template was then used to outline the mastoid interpolation flap.  The donor area for the pedicle flap was then injected with anesthesia.  The flap was excised through the skin and subcutaneous tissue down to the layer of the underlying musculature.  The pedicle flap was carefully excised within this deep plane to maintain its blood supply.  The edges of the donor site were undermined.   The donor site was closed in a primary fashion.  The pedicle was then rotated into position and sutured.  Once the tube was sutured into place, adequate blood supply was confirmed with blanching and refill.  The pedicle was then wrapped with xeroform gauze and dressed appropriately with a telfa and gauze bandage to ensure continued blood supply and protect the attached pedicle.
Posterior Auricular Interpolation Flap Text: A decision was made to reconstruct the defect utilizing an interpolation axial flap and a staged reconstruction.  A telfa template was made of the defect.  This telfa template was then used to outline the posterior auricular interpolation flap.  The donor area for the pedicle flap was then injected with anesthesia.  The flap was excised through the skin and subcutaneous tissue down to the layer of the underlying musculature.  The pedicle flap was carefully excised within this deep plane to maintain its blood supply.  The edges of the donor site were undermined.   The donor site was closed in a primary fashion.  The pedicle was then rotated into position and sutured.  Once the tube was sutured into place, adequate blood supply was confirmed with blanching and refill.  The pedicle was then wrapped with xeroform gauze and dressed appropriately with a telfa and gauze bandage to ensure continued blood supply and protect the attached pedicle.
Paramedian Forehead Flap Text: A decision was made to reconstruct the defect utilizing an interpolation axial flap and a staged reconstruction.  A telfa template was made of the defect.  This telfa template was then used to outline the paramedian forehead pedicle flap.  The donor area for the pedicle flap was then injected with anesthesia.  The flap was excised through the skin and subcutaneous tissue down to the layer of the underlying musculature.  The pedicle flap was carefully excised within this deep plane to maintain its blood supply.  The edges of the donor site were undermined.   The donor site was closed in a primary fashion.  The pedicle was then rotated into position and sutured.  Once the tube was sutured into place, adequate blood supply was confirmed with blanching and refill.  The pedicle was then wrapped with xeroform gauze and dressed appropriately with a telfa and gauze bandage to ensure continued blood supply and protect the attached pedicle.
Abbe Flap (Upper To Lower Lip) Text: The defect of the lower lip was assessed and measured.  Given the location and size of the defect, an Abbe flap was deemed most appropriate. Using a sterile surgical marker, an appropriate Abbe flap was measured and drawn on the upper lip. Local anesthesia was then infiltrated.  A scalpel was then used to incise the upper lip through and through the skin, vermilion, muscle and mucosa, leaving the flap pedicled on the opposite side.  The flap was then rotated and transferred to the lower lip defect.  The flap was then sutured into place with a three layer technique, closing the orbicularis oris muscle layer with subcutaneous buried sutures, followed by a mucosal layer and an epidermal layer.
Abbe Flap (Lower To Upper Lip) Text: The defect of the upper lip was assessed and measured.  Given the location and size of the defect, an Abbe flap was deemed most appropriate. Using a sterile surgical marker, an appropriate Abbe flap was measured and drawn on the lower lip. Local anesthesia was then infiltrated. A scalpel was then used to incise the upper lip through and through the skin, vermilion, muscle and mucosa, leaving the flap pedicled on the opposite side.  The flap was then rotated and transferred to the lower lip defect.  The flap was then sutured into place with a three layer technique, closing the orbicularis oris muscle layer with subcutaneous buried sutures, followed by a mucosal layer and an epidermal layer.
Estlander Flap (Upper To Lower Lip) Text: The defect of the lower lip was assessed and measured.  Given the location and size of the defect, an Estlander flap was deemed most appropriate. Using a sterile surgical marker, an appropriate Estlander flap was measured and drawn on the upper lip. Local anesthesia was then infiltrated. A scalpel was then used to incise the lateral aspect of the flap, through skin, muscle and mucosa, leaving the flap pedicled medially.  The flap was then rotated and positioned to fill the lower lip defect.  The flap was then sutured into place with a three layer technique, closing the orbicularis oris muscle layer with subcutaneous buried sutures, followed by a mucosal layer and an epidermal layer.
Lip Wedge Excision Repair Text: Given the location of the defect and the proximity to free margins a full thickness wedge repair was deemed most appropriate.  Using a sterile surgical marker, the appropriate repair was drawn incorporating the defect and placing the expected incisions perpendicular to the vermilion border.  The vermilion border was also meticulously outlined to ensure appropriate reapproximation during the repair.  The area thus outlined was incised through and through with a #15 scalpel blade.  The muscularis and dermis were reaproximated with deep sutures following hemostasis. Care was taken to realign the vermilion border before proceeding with the superficial closure.  Once the vermilion was realigned the superfical and mucosal closure was finished.
Ftsg Text: The defect edges were debeveled with a #15 scalpel blade.  Given the location of the defect, shape of the defect and the proximity to free margins a full thickness skin graft was deemed most appropriate.  Using a sterile surgical marker, the primary defect shape was transferred to the donor site. The area thus outlined was incised deep to adipose tissue with a #15 scalpel blade.  The harvested graft was then trimmed of adipose tissue until only dermis and epidermis was left.  The skin margins of the secondary defect were undermined to an appropriate distance in all directions utilizing iris scissors.  The secondary defect was closed with interrupted buried subcutaneous sutures.  The skin edges were then re-apposed with running  sutures.  The skin graft was then placed in the primary defect and oriented appropriately.
Split-Thickness Skin Graft Text: The defect edges were debeveled with a #15 scalpel blade.  Given the location of the defect, shape of the defect and the proximity to free margins a split thickness skin graft was deemed most appropriate.  Using a sterile surgical marker, the primary defect shape was transferred to the donor site. The split thickness graft was then harvested.  The skin graft was then placed in the primary defect and oriented appropriately.
Pinch Graft Text: The defect edges were debeveled with a #15 scalpel blade. Given the location of the defect, shape of the defect and the proximity to free margins a pinch graft was deemed most appropriate. Using a sterile surgical marker, the primary defect shape was transferred to the donor site. The area thus outlined was incised deep to adipose tissue with a #15 scalpel blade.  The harvested graft was then trimmed of adipose tissue until only dermis and epidermis was left. The skin graft was then placed in the primary defect and oriented appropriately.
Burow's Graft Text: The defect edges were debeveled with a #15 scalpel blade.  Given the location of the defect, shape of the defect, the proximity to free margins and the presence of a standing cone deformity a Burow's skin graft was deemed most appropriate. The standing cone was removed and this tissue was then trimmed to the shape of the primary defect. The adipose tissue was also removed until only dermis and epidermis were left.  The skin margins of the secondary defect were undermined to an appropriate distance in all directions utilizing iris scissors.  The secondary defect was closed with interrupted buried subcutaneous sutures.  The skin edges were then re-apposed with running  sutures.  The skin graft was then placed in the primary defect and oriented appropriately.
Cartilage Graft Text: The defect edges were debeveled with a #15 scalpel blade.  Given the location of the defect, shape of the defect, the fact the defect involved a full thickness cartilage defect a cartilage graft was deemed most appropriate.  An appropriate donor site was identified, cleansed, and anesthetized. The cartilage graft was then harvested and transferred to the recipient site, oriented appropriately and then sutured into place.  The secondary defect was then repaired using a primary closure.
Composite Graft Text: The defect edges were debeveled with a #15 scalpel blade.  Given the location of the defect, shape of the defect, the proximity to free margins and the fact the defect was full thickness a composite graft was deemed most appropriate.  The defect was outline and then transferred to the donor site.  A full thickness graft was then excised from the donor site. The graft was then placed in the primary defect, oriented appropriately and then sutured into place.  The secondary defect was then repaired using a primary closure.
Epidermal Autograft Text: The defect edges were debeveled with a #15 scalpel blade.  Given the location of the defect, shape of the defect and the proximity to free margins an epidermal autograft was deemed most appropriate.  Using a sterile surgical marker, the primary defect shape was transferred to the donor site. The epidermal graft was then harvested.  The skin graft was then placed in the primary defect and oriented appropriately.
Dermal Autograft Text: The defect edges were debeveled with a #15 scalpel blade.  Given the location of the defect, shape of the defect and the proximity to free margins a dermal autograft was deemed most appropriate.  Using a sterile surgical marker, the primary defect shape was transferred to the donor site. The area thus outlined was incised deep to adipose tissue with a #15 scalpel blade.  The harvested graft was then trimmed of adipose and epidermal tissue until only dermis was left.  The skin graft was then placed in the primary defect and oriented appropriately.
Skin Substitute Text: The defect edges were debeveled with a #15 scalpel blade.  Given the location of the defect, shape of the defect and the proximity to free margins a skin substitute graft was deemed most appropriate.  The graft material was trimmed to fit the size of the defect. The graft was then placed in the primary defect and oriented appropriately.
Tissue Cultured Epidermal Autograft Text: The defect edges were debeveled with a #15 scalpel blade.  Given the location of the defect, shape of the defect and the proximity to free margins a tissue cultured epidermal autograft was deemed most appropriate.  The graft was then trimmed to fit the size of the defect.  The graft was then placed in the primary defect and oriented appropriately.
Xenograft Text: The defect edges were debeveled with a #15 scalpel blade.  Given the location of the defect, shape of the defect and the proximity to free margins a xenograft was deemed most appropriate.  The graft was then trimmed to fit the size of the defect.  The graft was then placed in the primary defect and oriented appropriately.
Purse String (Intermediate) Text: Given the location of the defect and the characteristics of the surrounding skin a purse string intermediate closure was deemed most appropriate.  Undermining was performed circumfirentially around the surgical defect.  A purse string suture was then placed and tightened.
Purse String (Simple) Text: Given the location of the defect and the characteristics of the surrounding skin a purse string simple closure was deemed most appropriate.  Undermining was performed circumferentially around the surgical defect.  A purse string suture was then placed and tightened.
Partial Purse String (Intermediate) Text: Given the location of the defect and the characteristics of the surrounding skin an intermediate purse string closure was deemed most appropriate.  Undermining was performed circumferentially around the surgical defect.  A purse string suture was then placed and tightened. Wound tension of the circular defect prevented complete closure of the wound.
Partial Purse String (Simple) Text: Given the location of the defect and the characteristics of the surrounding skin a simple purse string closure was deemed most appropriate.  Undermining was performed circumferentially around the surgical defect.  A purse string suture was then placed and tightened. Wound tension of the circular defect prevented complete closure of the wound.
Complex Repair And Single Advancement Flap Text: The defect edges were debeveled with a #15 scalpel blade.  The primary defect was closed partially with a complex linear closure.  Given the location of the remaining defect, shape of the defect and the proximity to free margins a single advancement flap was deemed most appropriate for complete closure of the defect.  Using a sterile surgical marker, an appropriate advancement flap was drawn incorporating the defect and placing the expected incisions within the relaxed skin tension lines where possible.    The area thus outlined was incised deep to adipose tissue with a #15 scalpel blade.  The skin margins were undermined to an appropriate distance in all directions utilizing iris scissors.
Complex Repair And Double Advancement Flap Text: The defect edges were debeveled with a #15 scalpel blade.  The primary defect was closed partially with a complex linear closure.  Given the location of the remaining defect, shape of the defect and the proximity to free margins a double advancement flap was deemed most appropriate for complete closure of the defect.  Using a sterile surgical marker, an appropriate advancement flap was drawn incorporating the defect and placing the expected incisions within the relaxed skin tension lines where possible.    The area thus outlined was incised deep to adipose tissue with a #15 scalpel blade.  The skin margins were undermined to an appropriate distance in all directions utilizing iris scissors.
Complex Repair And Modified Advancement Flap Text: The defect edges were debeveled with a #15 scalpel blade.  The primary defect was closed partially with a complex linear closure.  Given the location of the remaining defect, shape of the defect and the proximity to free margins a modified advancement flap was deemed most appropriate for complete closure of the defect.  Using a sterile surgical marker, an appropriate advancement flap was drawn incorporating the defect and placing the expected incisions within the relaxed skin tension lines where possible.    The area thus outlined was incised deep to adipose tissue with a #15 scalpel blade.  The skin margins were undermined to an appropriate distance in all directions utilizing iris scissors.
Complex Repair And A-T Advancement Flap Text: The defect edges were debeveled with a #15 scalpel blade.  The primary defect was closed partially with a complex linear closure.  Given the location of the remaining defect, shape of the defect and the proximity to free margins an A-T advancement flap was deemed most appropriate for complete closure of the defect.  Using a sterile surgical marker, an appropriate advancement flap was drawn incorporating the defect and placing the expected incisions within the relaxed skin tension lines where possible.    The area thus outlined was incised deep to adipose tissue with a #15 scalpel blade.  The skin margins were undermined to an appropriate distance in all directions utilizing iris scissors.
Complex Repair And O-T Advancement Flap Text: The defect edges were debeveled with a #15 scalpel blade.  The primary defect was closed partially with a complex linear closure.  Given the location of the remaining defect, shape of the defect and the proximity to free margins an O-T advancement flap was deemed most appropriate for complete closure of the defect.  Using a sterile surgical marker, an appropriate advancement flap was drawn incorporating the defect and placing the expected incisions within the relaxed skin tension lines where possible.    The area thus outlined was incised deep to adipose tissue with a #15 scalpel blade.  The skin margins were undermined to an appropriate distance in all directions utilizing iris scissors.
Complex Repair And O-L Flap Text: The defect edges were debeveled with a #15 scalpel blade.  The primary defect was closed partially with a complex linear closure.  Given the location of the remaining defect, shape of the defect and the proximity to free margins an O-L flap was deemed most appropriate for complete closure of the defect.  Using a sterile surgical marker, an appropriate flap was drawn incorporating the defect and placing the expected incisions within the relaxed skin tension lines where possible.    The area thus outlined was incised deep to adipose tissue with a #15 scalpel blade.  The skin margins were undermined to an appropriate distance in all directions utilizing iris scissors.
Complex Repair And Bilobe Flap Text: The defect edges were debeveled with a #15 scalpel blade.  The primary defect was closed partially with a complex linear closure.  Given the location of the remaining defect, shape of the defect and the proximity to free margins a bilobe flap was deemed most appropriate for complete closure of the defect.  Using a sterile surgical marker, an appropriate advancement flap was drawn incorporating the defect and placing the expected incisions within the relaxed skin tension lines where possible.    The area thus outlined was incised deep to adipose tissue with a #15 scalpel blade.  The skin margins were undermined to an appropriate distance in all directions utilizing iris scissors.
Complex Repair And Melolabial Flap Text: The defect edges were debeveled with a #15 scalpel blade.  The primary defect was closed partially with a complex linear closure.  Given the location of the remaining defect, shape of the defect and the proximity to free margins a melolabial flap was deemed most appropriate for complete closure of the defect.  Using a sterile surgical marker, an appropriate advancement flap was drawn incorporating the defect and placing the expected incisions within the relaxed skin tension lines where possible.    The area thus outlined was incised deep to adipose tissue with a #15 scalpel blade.  The skin margins were undermined to an appropriate distance in all directions utilizing iris scissors.
Complex Repair And Rotation Flap Text: The defect edges were debeveled with a #15 scalpel blade.  The primary defect was closed partially with a complex linear closure.  Given the location of the remaining defect, shape of the defect and the proximity to free margins a rotation flap was deemed most appropriate for complete closure of the defect.  Using a sterile surgical marker, an appropriate advancement flap was drawn incorporating the defect and placing the expected incisions within the relaxed skin tension lines where possible.    The area thus outlined was incised deep to adipose tissue with a #15 scalpel blade.  The skin margins were undermined to an appropriate distance in all directions utilizing iris scissors.
Complex Repair And Rhombic Flap Text: The defect edges were debeveled with a #15 scalpel blade.  The primary defect was closed partially with a complex linear closure.  Given the location of the remaining defect, shape of the defect and the proximity to free margins a rhombic flap was deemed most appropriate for complete closure of the defect.  Using a sterile surgical marker, an appropriate advancement flap was drawn incorporating the defect and placing the expected incisions within the relaxed skin tension lines where possible.    The area thus outlined was incised deep to adipose tissue with a #15 scalpel blade.  The skin margins were undermined to an appropriate distance in all directions utilizing iris scissors.
Complex Repair And Transposition Flap Text: The defect edges were debeveled with a #15 scalpel blade.  The primary defect was closed partially with a complex linear closure.  Given the location of the remaining defect, shape of the defect and the proximity to free margins a transposition flap was deemed most appropriate for complete closure of the defect.  Using a sterile surgical marker, an appropriate advancement flap was drawn incorporating the defect and placing the expected incisions within the relaxed skin tension lines where possible.    The area thus outlined was incised deep to adipose tissue with a #15 scalpel blade.  The skin margins were undermined to an appropriate distance in all directions utilizing iris scissors.
Complex Repair And V-Y Plasty Text: The defect edges were debeveled with a #15 scalpel blade.  The primary defect was closed partially with a complex linear closure.  Given the location of the remaining defect, shape of the defect and the proximity to free margins a V-Y plasty was deemed most appropriate for complete closure of the defect.  Using a sterile surgical marker, an appropriate advancement flap was drawn incorporating the defect and placing the expected incisions within the relaxed skin tension lines where possible.    The area thus outlined was incised deep to adipose tissue with a #15 scalpel blade.  The skin margins were undermined to an appropriate distance in all directions utilizing iris scissors.
Complex Repair And M Plasty Text: The defect edges were debeveled with a #15 scalpel blade.  The primary defect was closed partially with a complex linear closure.  Given the location of the remaining defect, shape of the defect and the proximity to free margins an M plasty was deemed most appropriate for complete closure of the defect.  Using a sterile surgical marker, an appropriate advancement flap was drawn incorporating the defect and placing the expected incisions within the relaxed skin tension lines where possible.    The area thus outlined was incised deep to adipose tissue with a #15 scalpel blade.  The skin margins were undermined to an appropriate distance in all directions utilizing iris scissors.
Complex Repair And Double M Plasty Text: The defect edges were debeveled with a #15 scalpel blade.  The primary defect was closed partially with a complex linear closure.  Given the location of the remaining defect, shape of the defect and the proximity to free margins a double M plasty was deemed most appropriate for complete closure of the defect.  Using a sterile surgical marker, an appropriate advancement flap was drawn incorporating the defect and placing the expected incisions within the relaxed skin tension lines where possible.    The area thus outlined was incised deep to adipose tissue with a #15 scalpel blade.  The skin margins were undermined to an appropriate distance in all directions utilizing iris scissors.
Complex Repair And W Plasty Text: The defect edges were debeveled with a #15 scalpel blade.  The primary defect was closed partially with a complex linear closure.  Given the location of the remaining defect, shape of the defect and the proximity to free margins a W plasty was deemed most appropriate for complete closure of the defect.  Using a sterile surgical marker, an appropriate advancement flap was drawn incorporating the defect and placing the expected incisions within the relaxed skin tension lines where possible.    The area thus outlined was incised deep to adipose tissue with a #15 scalpel blade.  The skin margins were undermined to an appropriate distance in all directions utilizing iris scissors.
Complex Repair And Z Plasty Text: The defect edges were debeveled with a #15 scalpel blade.  The primary defect was closed partially with a complex linear closure.  Given the location of the remaining defect, shape of the defect and the proximity to free margins a Z plasty was deemed most appropriate for complete closure of the defect.  Using a sterile surgical marker, an appropriate advancement flap was drawn incorporating the defect and placing the expected incisions within the relaxed skin tension lines where possible.    The area thus outlined was incised deep to adipose tissue with a #15 scalpel blade.  The skin margins were undermined to an appropriate distance in all directions utilizing iris scissors.
Complex Repair And Dorsal Nasal Flap Text: The defect edges were debeveled with a #15 scalpel blade.  The primary defect was closed partially with a complex linear closure.  Given the location of the remaining defect, shape of the defect and the proximity to free margins a dorsal nasal flap was deemed most appropriate for complete closure of the defect.  Using a sterile surgical marker, an appropriate flap was drawn incorporating the defect and placing the expected incisions within the relaxed skin tension lines where possible.    The area thus outlined was incised deep to adipose tissue with a #15 scalpel blade.  The skin margins were undermined to an appropriate distance in all directions utilizing iris scissors.
Complex Repair And Ftsg Text: The defect edges were debeveled with a #15 scalpel blade.  The primary defect was closed partially with a complex linear closure.  Given the location of the defect, shape of the defect and the proximity to free margins a full thickness skin graft was deemed most appropriate to repair the remaining defect.  The graft was trimmed to fit the size of the remaining defect.  The graft was then placed in the primary defect, oriented appropriately, and sutured into place.
Complex Repair And Burow's Graft Text: The defect edges were debeveled with a #15 scalpel blade.  The primary defect was closed partially with a complex linear closure.  Given the location of the defect, shape of the defect, the proximity to free margins and the presence of a standing cone deformity a Burow's graft was deemed most appropriate to repair the remaining defect.  The graft was trimmed to fit the size of the remaining defect.  The graft was then placed in the primary defect, oriented appropriately, and sutured into place.
Complex Repair And Split-Thickness Skin Graft Text: The defect edges were debeveled with a #15 scalpel blade.  The primary defect was closed partially with a complex linear closure.  Given the location of the defect, shape of the defect and the proximity to free margins a split thickness skin graft was deemed most appropriate to repair the remaining defect.  The graft was trimmed to fit the size of the remaining defect.  The graft was then placed in the primary defect, oriented appropriately, and sutured into place.
Complex Repair And Epidermal Autograft Text: The defect edges were debeveled with a #15 scalpel blade.  The primary defect was closed partially with a complex linear closure.  Given the location of the defect, shape of the defect and the proximity to free margins an epidermal autograft was deemed most appropriate to repair the remaining defect.  The graft was trimmed to fit the size of the remaining defect.  The graft was then placed in the primary defect, oriented appropriately, and sutured into place.
Complex Repair And Dermal Autograft Text: The defect edges were debeveled with a #15 scalpel blade.  The primary defect was closed partially with a complex linear closure.  Given the location of the defect, shape of the defect and the proximity to free margins an dermal autograft was deemed most appropriate to repair the remaining defect.  The graft was trimmed to fit the size of the remaining defect.  The graft was then placed in the primary defect, oriented appropriately, and sutured into place.
Complex Repair And Tissue Cultured Epidermal Autograft Text: The defect edges were debeveled with a #15 scalpel blade.  The primary defect was closed partially with a complex linear closure.  Given the location of the defect, shape of the defect and the proximity to free margins an tissue cultured epidermal autograft was deemed most appropriate to repair the remaining defect.  The graft was trimmed to fit the size of the remaining defect.  The graft was then placed in the primary defect, oriented appropriately, and sutured into place.
Complex Repair And Xenograft Text: The defect edges were debeveled with a #15 scalpel blade.  The primary defect was closed partially with a complex linear closure.  Given the location of the defect, shape of the defect and the proximity to free margins a xenograft was deemed most appropriate to repair the remaining defect.  The graft was trimmed to fit the size of the remaining defect.  The graft was then placed in the primary defect, oriented appropriately, and sutured into place.
Complex Repair And Skin Substitute Graft Text: The defect edges were debeveled with a #15 scalpel blade.  The primary defect was closed partially with a complex linear closure.  Given the location of the remaining defect, shape of the defect and the proximity to free margins a skin substitute graft was deemed most appropriate to repair the remaining defect.  The graft was trimmed to fit the size of the remaining defect.  The graft was then placed in the primary defect, oriented appropriately, and sutured into place.
Include Anticoagulation In Mohs Note?: Please Select the Appropriate Response
Path Notes (To The Dermatopathologist): Please check margins.
Consent was obtained from the patient. The risks and benefits to therapy were discussed in detail. Specifically, the risks of infection, scarring, bleeding, prolonged wound healing, incomplete removal, allergy to anesthesia, nerve injury and recurrence were addressed. Prior to the procedure, the treatment site was clearly identified and confirmed by the patient. All components of Universal Protocol/PAUSE Rule completed.
Post-Care Instructions: I reviewed with the patient in detail post-care instructions. Patient is not to engage in any heavy lifting, exercise, or swimming for the next 14 days. Should the patient develop any fevers, chills, bleeding, severe pain patient will contact the office immediately.
Home Suture Removal Text: Patient was provided a home suture removal kit and will remove their sutures at home.  If they have any questions or difficulties they will call the office.
Where Do You Want The Question To Include Opioid Counseling Located?: Case Summary Tab
Information: Selecting Yes will display possible errors in your note based on the variables you have selected. This validation is only offered as a suggestion for you. PLEASE NOTE THAT THE VALIDATION TEXT WILL BE REMOVED WHEN YOU FINALIZE YOUR NOTE. IF YOU WANT TO FAX A PRELIMINARY NOTE YOU WILL NEED TO TOGGLE THIS TO 'NO' IF YOU DO NOT WANT IT IN YOUR FAXED NOTE.

## 2025-03-26 ENCOUNTER — APPOINTMENT (OUTPATIENT)
Dept: URBAN - METROPOLITAN AREA CLINIC 6 | Facility: CLINIC | Age: 75
Setting detail: DERMATOLOGY
End: 2025-03-26

## 2025-03-26 DIAGNOSIS — L82.1 OTHER SEBORRHEIC KERATOSIS: ICD-10-CM

## 2025-03-26 DIAGNOSIS — D18.0 HEMANGIOMA: ICD-10-CM

## 2025-03-26 DIAGNOSIS — Z85.828 PERSONAL HISTORY OF OTHER MALIGNANT NEOPLASM OF SKIN: ICD-10-CM | Status: STABLE

## 2025-03-26 DIAGNOSIS — L81.4 OTHER MELANIN HYPERPIGMENTATION: ICD-10-CM

## 2025-03-26 DIAGNOSIS — Z71.89 OTHER SPECIFIED COUNSELING: ICD-10-CM

## 2025-03-26 DIAGNOSIS — L57.0 ACTINIC KERATOSIS: ICD-10-CM

## 2025-03-26 DIAGNOSIS — D22 MELANOCYTIC NEVI: ICD-10-CM

## 2025-03-26 PROBLEM — D18.01 HEMANGIOMA OF SKIN AND SUBCUTANEOUS TISSUE: Status: ACTIVE | Noted: 2025-03-26

## 2025-03-26 PROBLEM — D22.5 MELANOCYTIC NEVI OF TRUNK: Status: ACTIVE | Noted: 2025-03-26

## 2025-03-26 PROCEDURE — 17003 DESTRUCT PREMALG LES 2-14: CPT

## 2025-03-26 PROCEDURE — ? LIQUID NITROGEN

## 2025-03-26 PROCEDURE — 17000 DESTRUCT PREMALG LESION: CPT

## 2025-03-26 PROCEDURE — ? COUNSELING

## 2025-03-26 PROCEDURE — 99213 OFFICE O/P EST LOW 20 MIN: CPT | Mod: 25

## 2025-03-26 PROCEDURE — ? SUNSCREEN TREATMENT REGIMEN

## 2025-03-26 ASSESSMENT — LOCATION SIMPLE DESCRIPTION DERM
LOCATION SIMPLE: ABDOMEN
LOCATION SIMPLE: LEFT CHEEK
LOCATION SIMPLE: NOSE
LOCATION SIMPLE: LEFT FOREARM
LOCATION SIMPLE: LEFT HAND
LOCATION SIMPLE: LEFT FOREHEAD
LOCATION SIMPLE: LEFT UPPER BACK
LOCATION SIMPLE: UPPER LIP
LOCATION SIMPLE: RIGHT HAND
LOCATION SIMPLE: RIGHT FOREHEAD
LOCATION SIMPLE: CHEST

## 2025-03-26 ASSESSMENT — LOCATION DETAILED DESCRIPTION DERM
LOCATION DETAILED: EPIGASTRIC SKIN
LOCATION DETAILED: LEFT SUPERIOR MEDIAL MALAR CHEEK
LOCATION DETAILED: LEFT DISTAL DORSAL FOREARM
LOCATION DETAILED: RIGHT INFERIOR FOREHEAD
LOCATION DETAILED: NASAL DORSUM
LOCATION DETAILED: MIDDLE STERNUM
LOCATION DETAILED: LEFT RADIAL DORSAL HAND
LOCATION DETAILED: RIGHT RADIAL DORSAL HAND
LOCATION DETAILED: LEFT SUPERIOR PREAURICULAR CHEEK
LOCATION DETAILED: PERIUMBILICAL SKIN
LOCATION DETAILED: PHILTRUM
LOCATION DETAILED: LEFT SUPERIOR MEDIAL UPPER BACK
LOCATION DETAILED: LEFT INFERIOR FOREHEAD

## 2025-03-26 ASSESSMENT — LOCATION ZONE DERM
LOCATION ZONE: ARM
LOCATION ZONE: NOSE
LOCATION ZONE: HAND
LOCATION ZONE: LIP
LOCATION ZONE: FACE
LOCATION ZONE: TRUNK

## 2025-03-26 NOTE — PROCEDURE: LIQUID NITROGEN
Render Note In Bullet Format When Appropriate: No
Show Applicator Variable?: Yes
Duration Of Freeze Thaw-Cycle (Seconds): 10
Consent: The patient's verbal consent was obtained including but not limited to risks of crusting, scabbing, blistering, scarring, darker or lighter pigmentary change, recurrence, incomplete removal and infection.
Post-Care Instructions: I reviewed with the patient in detail post-care instructions. Patient is to wear sunprotection, and avoid picking at any of the treated lesions. Pt may apply Vaseline to crusted or scabbing areas.
Number Of Freeze-Thaw Cycles: 2 freeze-thaw cycles
Detail Level: Detailed

## 2025-08-25 ENCOUNTER — APPOINTMENT (OUTPATIENT)
Dept: URBAN - METROPOLITAN AREA CLINIC 6 | Facility: CLINIC | Age: 75
Setting detail: DERMATOLOGY
End: 2025-08-25

## 2025-08-25 DIAGNOSIS — L82.0 INFLAMED SEBORRHEIC KERATOSIS: ICD-10-CM

## 2025-08-25 PROCEDURE — ? COUNSELING

## 2025-08-25 PROCEDURE — ? LIQUID NITROGEN

## 2025-08-25 ASSESSMENT — LOCATION SIMPLE DESCRIPTION DERM: LOCATION SIMPLE: LEFT BREAST

## 2025-08-25 ASSESSMENT — LOCATION DETAILED DESCRIPTION DERM: LOCATION DETAILED: LEFT LATERAL BREAST 2-3:00 REGION

## 2025-08-25 ASSESSMENT — LOCATION ZONE DERM: LOCATION ZONE: TRUNK
